# Patient Record
Sex: FEMALE | Race: WHITE | Employment: FULL TIME | URBAN - NONMETROPOLITAN AREA
[De-identification: names, ages, dates, MRNs, and addresses within clinical notes are randomized per-mention and may not be internally consistent; named-entity substitution may affect disease eponyms.]

---

## 2017-01-11 DIAGNOSIS — E55.9 VITAMIN D DEFICIENCY: Primary | ICD-10-CM

## 2017-01-11 RX ORDER — CHOLECALCIFEROL (VITAMIN D3) 1250 MCG
1 CAPSULE ORAL WEEKLY
Qty: 4 CAPSULE | Refills: 1 | Status: SHIPPED | OUTPATIENT
Start: 2017-01-11

## 2017-01-13 ENCOUNTER — OFFICE VISIT (OUTPATIENT)
Dept: BARIATRICS/WEIGHT MGMT | Age: 42
End: 2017-01-13

## 2017-01-13 VITALS
RESPIRATION RATE: 18 BRPM | SYSTOLIC BLOOD PRESSURE: 138 MMHG | WEIGHT: 293 LBS | DIASTOLIC BLOOD PRESSURE: 86 MMHG | TEMPERATURE: 98.8 F | HEIGHT: 67 IN | BODY MASS INDEX: 45.99 KG/M2 | HEART RATE: 76 BPM

## 2017-01-13 DIAGNOSIS — E66.01 OBESITY, MORBID, BMI 50 OR HIGHER (HCC): ICD-10-CM

## 2017-01-13 DIAGNOSIS — E66.9 OBESITY, UNSPECIFIED OBESITY SEVERITY, UNSPECIFIED OBESITY TYPE: Primary | ICD-10-CM

## 2017-01-13 DIAGNOSIS — K21.9 GASTROESOPHAGEAL REFLUX DISEASE, ESOPHAGITIS PRESENCE NOT SPECIFIED: ICD-10-CM

## 2017-01-13 DIAGNOSIS — Z13.21 MALNUTRITION SCREEN: Primary | ICD-10-CM

## 2017-01-13 DIAGNOSIS — I10 ESSENTIAL HYPERTENSION: ICD-10-CM

## 2017-01-13 DIAGNOSIS — E89.0 POSTOPERATIVE HYPOTHYROIDISM: ICD-10-CM

## 2017-01-13 DIAGNOSIS — K91.2 POSTOPERATIVE INTESTINAL MALABSORPTION: ICD-10-CM

## 2017-01-13 DIAGNOSIS — Z98.84 S/P LAPAROSCOPIC SLEEVE GASTRECTOMY: ICD-10-CM

## 2017-01-13 PROCEDURE — 99213 OFFICE O/P EST LOW 20 MIN: CPT | Performed by: PHYSICIAN ASSISTANT

## 2017-04-10 ENCOUNTER — OFFICE VISIT (OUTPATIENT)
Dept: BARIATRICS/WEIGHT MGMT | Age: 42
End: 2017-04-10

## 2017-04-10 VITALS
HEIGHT: 67 IN | BODY MASS INDEX: 45.99 KG/M2 | RESPIRATION RATE: 18 BRPM | DIASTOLIC BLOOD PRESSURE: 76 MMHG | SYSTOLIC BLOOD PRESSURE: 112 MMHG | TEMPERATURE: 98.5 F | WEIGHT: 293 LBS | HEART RATE: 76 BPM

## 2017-04-10 DIAGNOSIS — Z98.84 STATUS POST LAPAROSCOPIC SLEEVE GASTRECTOMY: ICD-10-CM

## 2017-04-10 DIAGNOSIS — Z13.21 SCREENING FOR MALNUTRITION: ICD-10-CM

## 2017-04-10 DIAGNOSIS — K91.2 POSTSURGICAL MALABSORPTION: ICD-10-CM

## 2017-04-10 DIAGNOSIS — E66.01 MORBID OBESITY WITH BMI OF 45.0-49.9, ADULT (HCC): ICD-10-CM

## 2017-04-10 PROCEDURE — 99213 OFFICE O/P EST LOW 20 MIN: CPT | Performed by: PHYSICIAN ASSISTANT

## 2017-04-10 RX ORDER — LANOLIN ALCOHOL/MO/W.PET/CERES
1000 CREAM (GRAM) TOPICAL DAILY
COMMUNITY

## 2017-04-10 RX ORDER — BIOTIN 1 MG
10000 TABLET ORAL DAILY
COMMUNITY
End: 2021-03-03 | Stop reason: ALTCHOICE

## 2017-05-01 ENCOUNTER — OFFICE VISIT (OUTPATIENT)
Dept: FAMILY MEDICINE CLINIC | Age: 42
End: 2017-05-01

## 2017-05-01 VITALS
WEIGHT: 290.2 LBS | BODY MASS INDEX: 45.55 KG/M2 | HEART RATE: 56 BPM | HEIGHT: 67 IN | RESPIRATION RATE: 16 BRPM | SYSTOLIC BLOOD PRESSURE: 122 MMHG | OXYGEN SATURATION: 98 % | DIASTOLIC BLOOD PRESSURE: 74 MMHG

## 2017-05-01 DIAGNOSIS — E03.9 ACQUIRED HYPOTHYROIDISM: ICD-10-CM

## 2017-05-01 DIAGNOSIS — I10 ESSENTIAL HYPERTENSION: Primary | ICD-10-CM

## 2017-05-01 DIAGNOSIS — G47.26 SHIFTING SLEEP-WORK SCHEDULE: ICD-10-CM

## 2017-05-01 DIAGNOSIS — L03.031 CELLULITIS OF RIGHT TOE: ICD-10-CM

## 2017-05-01 PROCEDURE — 99204 OFFICE O/P NEW MOD 45 MIN: CPT | Performed by: FAMILY MEDICINE

## 2017-05-01 RX ORDER — CEPHALEXIN 500 MG/1
500 CAPSULE ORAL 3 TIMES DAILY
Qty: 21 CAPSULE | Refills: 0 | Status: SHIPPED | OUTPATIENT
Start: 2017-05-01 | End: 2017-05-08

## 2017-05-01 ASSESSMENT — ENCOUNTER SYMPTOMS
ABDOMINAL PAIN: 0
COLOR CHANGE: 1
COUGH: 0
SHORTNESS OF BREATH: 0
EYE DISCHARGE: 0
DIARRHEA: 0
NAUSEA: 0
SORE THROAT: 0
CONSTIPATION: 0
RHINORRHEA: 0
WHEEZING: 0

## 2017-05-24 LAB
CHOLESTEROL, TOTAL: 162 MG/DL (ref 0–199)
FASTING: YES
GLUCOSE BLD-MCNC: 83 MG/DL (ref 74–109)
HDLC SERPL-MCNC: 44 MG/DL (ref 40–90)
LDL CHOLESTEROL CALCULATED: 108 MG/DL
TRIGL SERPL-MCNC: 52 MG/DL (ref 0–199)

## 2017-05-25 ENCOUNTER — EMPLOYEE WELLNESS (OUTPATIENT)
Dept: OTHER | Age: 42
End: 2017-05-25

## 2017-07-06 ENCOUNTER — OFFICE VISIT (OUTPATIENT)
Dept: FAMILY MEDICINE CLINIC | Age: 42
End: 2017-07-06

## 2017-07-06 VITALS
WEIGHT: 276.8 LBS | OXYGEN SATURATION: 98 % | RESPIRATION RATE: 16 BRPM | SYSTOLIC BLOOD PRESSURE: 122 MMHG | BODY MASS INDEX: 43.35 KG/M2 | DIASTOLIC BLOOD PRESSURE: 74 MMHG | HEART RATE: 62 BPM

## 2017-07-06 DIAGNOSIS — M25.562 ACUTE PAIN OF BOTH KNEES: Primary | ICD-10-CM

## 2017-07-06 DIAGNOSIS — M25.561 ACUTE PAIN OF BOTH KNEES: Primary | ICD-10-CM

## 2017-07-06 DIAGNOSIS — M70.52 KNEE BURSITIS, LEFT: ICD-10-CM

## 2017-07-06 PROCEDURE — 99213 OFFICE O/P EST LOW 20 MIN: CPT | Performed by: NURSE PRACTITIONER

## 2017-07-06 RX ORDER — CEPHALEXIN 500 MG/1
500 CAPSULE ORAL 3 TIMES DAILY
Qty: 30 CAPSULE | Refills: 0 | Status: SHIPPED | OUTPATIENT
Start: 2017-07-06 | End: 2017-07-16

## 2017-07-06 RX ORDER — METHYLPREDNISOLONE 4 MG/1
TABLET ORAL
Qty: 1 KIT | Refills: 0 | Status: SHIPPED | OUTPATIENT
Start: 2017-07-06 | End: 2017-07-11 | Stop reason: ALTCHOICE

## 2017-07-08 ASSESSMENT — ENCOUNTER SYMPTOMS
BACK PAIN: 0
COLOR CHANGE: 0
SHORTNESS OF BREATH: 0

## 2017-07-11 ENCOUNTER — OFFICE VISIT (OUTPATIENT)
Dept: BARIATRICS/WEIGHT MGMT | Age: 42
End: 2017-07-11

## 2017-07-11 VITALS
DIASTOLIC BLOOD PRESSURE: 80 MMHG | SYSTOLIC BLOOD PRESSURE: 118 MMHG | HEART RATE: 56 BPM | HEIGHT: 67 IN | RESPIRATION RATE: 18 BRPM | TEMPERATURE: 98.1 F | WEIGHT: 267 LBS | BODY MASS INDEX: 41.91 KG/M2

## 2017-07-11 DIAGNOSIS — K91.2 POSTSURGICAL MALABSORPTION: ICD-10-CM

## 2017-07-11 DIAGNOSIS — Z13.21 SCREENING FOR MALNUTRITION: ICD-10-CM

## 2017-07-11 DIAGNOSIS — K21.9 GASTROESOPHAGEAL REFLUX DISEASE, ESOPHAGITIS PRESENCE NOT SPECIFIED: ICD-10-CM

## 2017-07-11 DIAGNOSIS — E66.01 MORBID OBESITY WITH BMI OF 40.0-44.9, ADULT (HCC): Primary | ICD-10-CM

## 2017-07-11 DIAGNOSIS — E03.9 HYPOTHYROIDISM, UNSPECIFIED TYPE: ICD-10-CM

## 2017-07-11 DIAGNOSIS — I10 ESSENTIAL HYPERTENSION: ICD-10-CM

## 2017-07-11 PROCEDURE — 99213 OFFICE O/P EST LOW 20 MIN: CPT | Performed by: PHYSICIAN ASSISTANT

## 2017-07-20 ENCOUNTER — OFFICE VISIT (OUTPATIENT)
Dept: FAMILY MEDICINE CLINIC | Age: 42
End: 2017-07-20
Payer: COMMERCIAL

## 2017-07-20 VITALS
RESPIRATION RATE: 20 BRPM | OXYGEN SATURATION: 98 % | WEIGHT: 269 LBS | BODY MASS INDEX: 42.22 KG/M2 | HEIGHT: 67 IN | TEMPERATURE: 98.8 F | HEART RATE: 82 BPM | DIASTOLIC BLOOD PRESSURE: 80 MMHG | SYSTOLIC BLOOD PRESSURE: 122 MMHG

## 2017-07-20 DIAGNOSIS — J02.9 SORE THROAT: ICD-10-CM

## 2017-07-20 DIAGNOSIS — H61.23 BILATERAL IMPACTED CERUMEN: ICD-10-CM

## 2017-07-20 DIAGNOSIS — J02.0 ACUTE STREPTOCOCCAL PHARYNGITIS: Primary | ICD-10-CM

## 2017-07-20 LAB — S PYO AG THROAT QL: POSITIVE

## 2017-07-20 PROCEDURE — 69210 REMOVE IMPACTED EAR WAX UNI: CPT | Performed by: NURSE PRACTITIONER

## 2017-07-20 PROCEDURE — 99213 OFFICE O/P EST LOW 20 MIN: CPT | Performed by: NURSE PRACTITIONER

## 2017-07-20 PROCEDURE — 87880 STREP A ASSAY W/OPTIC: CPT | Performed by: NURSE PRACTITIONER

## 2017-07-20 RX ORDER — AMOXICILLIN 400 MG/5ML
500 POWDER, FOR SUSPENSION ORAL 3 TIMES DAILY
Qty: 189 ML | Refills: 0 | Status: SHIPPED | OUTPATIENT
Start: 2017-07-20 | End: 2017-07-30

## 2017-07-20 ASSESSMENT — ENCOUNTER SYMPTOMS
ABDOMINAL PAIN: 0
SINUS PRESSURE: 0
NAUSEA: 0
VOMITING: 0
SORE THROAT: 1
COLOR CHANGE: 0
DIARRHEA: 0
SHORTNESS OF BREATH: 0
WHEEZING: 0
COUGH: 0

## 2017-10-05 ENCOUNTER — HOSPITAL ENCOUNTER (OUTPATIENT)
Age: 42
Discharge: HOME | End: 2017-10-05
Payer: COMMERCIAL

## 2017-10-05 DIAGNOSIS — Z13.21 SCREENING FOR MALNUTRITION: ICD-10-CM

## 2017-10-05 DIAGNOSIS — K91.2 POSTSURGICAL MALABSORPTION: ICD-10-CM

## 2017-10-05 DIAGNOSIS — E66.01 MORBID OBESITY WITH BMI OF 40.0-44.9, ADULT (HCC): ICD-10-CM

## 2017-10-05 LAB
ALBUMIN SERPL-MCNC: 4.1 G/DL (ref 3.5–5.1)
ALP BLD-CCNC: 63 U/L (ref 38–126)
ALT SERPL-CCNC: 15 U/L (ref 11–66)
ANION GAP SERPL CALCULATED.3IONS-SCNC: 7 MEQ/L (ref 8–16)
AST SERPL-CCNC: 19 U/L (ref 5–40)
AVERAGE GLUCOSE: 87 MG/DL (ref 70–126)
BASOPHILS # BLD: 0.5 %
BASOPHILS ABSOLUTE: 0 THOU/MM3 (ref 0–0.1)
BILIRUB SERPL-MCNC: 0.7 MG/DL (ref 0.3–1.2)
BUN BLDV-MCNC: 16 MG/DL (ref 7–22)
CALCIUM SERPL-MCNC: 9 MG/DL (ref 8.5–10.5)
CHLORIDE BLD-SCNC: 104 MEQ/L (ref 98–111)
CHOLESTEROL, TOTAL: 156 MG/DL (ref 100–199)
CO2: 29 MEQ/L (ref 23–33)
CREAT SERPL-MCNC: 0.5 MG/DL (ref 0.4–1.2)
EOSINOPHIL # BLD: 3.1 %
EOSINOPHILS ABSOLUTE: 0.2 THOU/MM3 (ref 0–0.4)
FERRITIN: 133 NG/ML (ref 10–291)
GFR SERPL CREATININE-BSD FRML MDRD: > 90 ML/MIN/1.73M2
GLUCOSE BLD-MCNC: 80 MG/DL (ref 70–108)
HBA1C MFR BLD: 4.9 % (ref 4.4–6.4)
HCT VFR BLD CALC: 39.5 % (ref 37–47)
HDLC SERPL-MCNC: 51 MG/DL
HEMOGLOBIN: 13.6 GM/DL (ref 12–16)
IRON: 102 UG/DL (ref 50–170)
LDL CHOLESTEROL CALCULATED: 98 MG/DL
LYMPHOCYTES # BLD: 40.7 %
LYMPHOCYTES ABSOLUTE: 2.3 THOU/MM3 (ref 1–4.8)
MCH RBC QN AUTO: 30.7 PG (ref 27–31)
MCHC RBC AUTO-ENTMCNC: 34.4 GM/DL (ref 33–37)
MCV RBC AUTO: 89.3 FL (ref 81–99)
MONOCYTES # BLD: 5.4 %
MONOCYTES ABSOLUTE: 0.3 THOU/MM3 (ref 0.4–1.3)
NUCLEATED RED BLOOD CELLS: 0 /100 WBC
PDW BLD-RTO: 14.4 % (ref 11.5–14.5)
PLATELET # BLD: 227 THOU/MM3 (ref 130–400)
PMV BLD AUTO: 8.6 MCM (ref 7.4–10.4)
POTASSIUM SERPL-SCNC: 4.4 MEQ/L (ref 3.5–5.2)
PREALBUMIN: 17.8 MG/DL (ref 20–40)
PTH INTACT: 33.5 PG/ML (ref 15–65)
RBC # BLD: 4.43 MILL/MM3 (ref 4.2–5.4)
RBC # BLD: NORMAL 10*6/UL
SEG NEUTROPHILS: 50.3 %
SEGMENTED NEUTROPHILS ABSOLUTE COUNT: 2.8 THOU/MM3 (ref 1.8–7.7)
SODIUM BLD-SCNC: 140 MEQ/L (ref 135–145)
TOTAL IRON BINDING CAPACITY: 239 UG/DL (ref 171–450)
TOTAL PROTEIN: 6.4 G/DL (ref 6.1–8)
TRIGL SERPL-MCNC: 35 MG/DL (ref 0–199)
TSH SERPL DL<=0.05 MIU/L-ACNC: 0.83 UIU/ML (ref 0.4–4.2)
VITAMIN D 25-HYDROXY: 38 NG/ML (ref 30–100)
WBC # BLD: 5.6 THOU/MM3 (ref 4.8–10.8)

## 2017-10-05 PROCEDURE — 83540 ASSAY OF IRON: CPT

## 2017-10-05 PROCEDURE — 82306 VITAMIN D 25 HYDROXY: CPT

## 2017-10-05 PROCEDURE — 80061 LIPID PANEL: CPT

## 2017-10-05 PROCEDURE — 80050 GENERAL HEALTH PANEL: CPT

## 2017-10-05 PROCEDURE — 83036 HEMOGLOBIN GLYCOSYLATED A1C: CPT

## 2017-10-05 PROCEDURE — 84134 ASSAY OF PREALBUMIN: CPT

## 2017-10-05 PROCEDURE — 83550 IRON BINDING TEST: CPT

## 2017-10-05 PROCEDURE — 84425 ASSAY OF VITAMIN B-1: CPT

## 2017-10-05 PROCEDURE — 36415 COLL VENOUS BLD VENIPUNCTURE: CPT

## 2017-10-05 PROCEDURE — 83970 ASSAY OF PARATHORMONE: CPT

## 2017-10-05 PROCEDURE — 82728 ASSAY OF FERRITIN: CPT

## 2017-10-08 LAB — VITAMIN B1 WHOLE BLOOD: 120 NMOL/L (ref 70–180)

## 2017-10-10 ENCOUNTER — OFFICE VISIT (OUTPATIENT)
Dept: BARIATRICS/WEIGHT MGMT | Age: 42
End: 2017-10-10
Payer: COMMERCIAL

## 2017-10-10 ENCOUNTER — OFFICE VISIT (OUTPATIENT)
Dept: BARIATRICS/WEIGHT MGMT | Age: 42
End: 2017-10-10

## 2017-10-10 VITALS
SYSTOLIC BLOOD PRESSURE: 122 MMHG | RESPIRATION RATE: 18 BRPM | WEIGHT: 262 LBS | HEART RATE: 56 BPM | TEMPERATURE: 98.5 F | BODY MASS INDEX: 41.12 KG/M2 | HEIGHT: 67 IN | DIASTOLIC BLOOD PRESSURE: 86 MMHG

## 2017-10-10 DIAGNOSIS — E03.9 HYPOTHYROIDISM, UNSPECIFIED TYPE: ICD-10-CM

## 2017-10-10 DIAGNOSIS — K21.9 GASTROESOPHAGEAL REFLUX DISEASE, ESOPHAGITIS PRESENCE NOT SPECIFIED: ICD-10-CM

## 2017-10-10 DIAGNOSIS — I10 ESSENTIAL HYPERTENSION: ICD-10-CM

## 2017-10-10 DIAGNOSIS — Z98.84 STATUS POST LAPAROSCOPIC SLEEVE GASTRECTOMY: ICD-10-CM

## 2017-10-10 DIAGNOSIS — E66.01 MORBID OBESITY, UNSPECIFIED OBESITY TYPE (HCC): Primary | ICD-10-CM

## 2017-10-10 DIAGNOSIS — E66.01 MORBID OBESITY WITH BMI OF 40.0-44.9, ADULT (HCC): Primary | ICD-10-CM

## 2017-10-10 PROCEDURE — 99213 OFFICE O/P EST LOW 20 MIN: CPT | Performed by: PHYSICIAN ASSISTANT

## 2017-10-10 NOTE — PATIENT INSTRUCTIONS
Great job on all your hard work! 1. Check out [employer]  2. Continue your bariatric vitamins as you are taking. Get your B12 vitamin level rechecked  3. Protein goal is 60-80 grams protein per day. Choose lean protein foods first, followed by vegetables and fruit, then starch food last if still hungry.

## 2017-10-10 NOTE — PROGRESS NOTES
2,000IU's daily, B12 once a day- pt to have B12 level rechecked    Does patient exercise: daily- walking four miles at a time, has  twice a week    Assessment  Patient with appropriate weight loss at one year post op. Focuses on protein foods first at meals. Taking bariatric vitamins as recommended to meet ASMBS guidelines. Regular cardiac and strength training activity. Plan  Plan/Recommendations: Positive reinforcement given. Discussed options for various websites sand resources. -  Patient Instructions   Andrew Feliberto wesley on all your hard work! 1. Check out [employer]  2. Continue your bariatric vitamins as you are taking. Get your B12 vitamin level rechecked  3. Protein goal is 60-80 grams protein per day. Choose lean protein foods first, followed by vegetables and fruit, then starch food last if still hungry.         Recommended Follow-Up: 3 month follow up per pt request with PA and BRIANNE Lira RD, LD  Dietitian- [facility]

## 2017-10-10 NOTE — PROGRESS NOTES
12/04/2015    Right Knee Arthroscopy    SLEEVE GASTRECTOMY  10/10/2016    robotic    STOMACH SURGERY      gastric sleeve    THYROIDECTOMY Bilateral 09/2016    TOOTH EXTRACTION Right 5/30/13    right lower       Past Social History:  Social History     Social History    Marital status:      Spouse name: N/A    Number of children: N/A    Years of education: N/A     Occupational History    Not on file. Social History Main Topics    Smoking status: Never Smoker    Smokeless tobacco: Never Used    Alcohol use 0.0 oz/week      Comment: social    Drug use: No    Sexual activity: Yes     Other Topics Concern    Not on file     Social History Narrative    No narrative on file        Medications:   Current Outpatient Prescriptions   Medication Sig Dispense Refill    vitamin B-12 (CYANOCOBALAMIN) 1000 MCG tablet Take 1,000 mcg by mouth daily      Biotin 1000 MCG TABS Take 10,000 mcg by mouth daily      Cholecalciferol (VITAMIN D3) 92078 UNITS CAPS Take 1 capsule by mouth once a week 4 capsule 1    lansoprazole (PREVACID) 30 MG delayed release capsule Take 1 capsule by mouth daily 30 capsule 3    calcium carbonate (OSCAL) 500 MG TABS tablet Take 500 mg by mouth 3 times daily      levothyroxine (SYNTHROID) 200 MCG tablet Take 225 mcg by mouth Daily      escitalopram (LEXAPRO) 10 MG tablet Take 10 mg by mouth daily      losartan (COZAAR) 100 MG tablet Take 100 mg by mouth daily       multivitamin (THERAGRAN) per tablet Take 1 tablet by mouth daily. No current facility-administered medications for this visit. Allergies:   No Known Allergies    Subjective:    Constitutional: Denies any fever, chills, fatigue. Wound: Denies any rash, skin color changes or wound problems. Resp: Denies any cough, shortness of breath. CV: Denies any chest pain, orthopnea or syncope. MS: Denies myalgias, arthralgias. GI: Denies any nausea, vomiting, diarrhea, constipation, melena, hematochezia.  No incisional discomfort. : Denies any hematuria, hesitancy or dysuria. Objective:      /86 (Site: Right Arm, Position: Sitting, Cuff Size: Large Adult)   Pulse 56   Temp 98.5 °F (36.9 °C) (Oral)   Resp 18   Ht 5' 6.5\" (1.689 m)   Wt 262 lb (118.8 kg)   BMI 41.65 kg/m²     Physical Examination:   Constitutional: Alert and oriented to person, place and time. Well-developed, well- nourished. Head: Normocephalic and atraumatic  Neck: Supple. Eyes: EOMI b/l. Conjunctivae normal.  No scleral icterus. Respiratory: Effort normal. No respiratory distress. Abd: Benign  Ext:  Movement x 4. No edema  Skin; Warm and dry, no visible rashes, lesions or ulcers.    Neuro: Cranial Nerves Grossly Intact; nml coordination    Labs:  CBC   Lab Results   Component Value Date    WBC 5.6 10/05/2017    RBC 4.43 10/05/2017    RBC 3.31 06/01/2012    HGB 13.6 10/05/2017    HCT 39.5 10/05/2017    MCV 89.3 10/05/2017    MCH 30.7 10/05/2017    MCHC 34.4 10/05/2017    RDW 14.4 10/05/2017     10/05/2017    MPV 8.6 10/05/2017    RBCMORP NORMAL 10/05/2017    SEGSPCT 50.3 10/05/2017    LABLYMP 40.7 10/05/2017    MONOPCT 5.4 10/05/2017    LABEOS 3.1 10/05/2017    BASO 0.5 10/05/2017    NRBC 0 10/05/2017    ANISOCYTOSIS 1+ 01/10/2017    SEGSABS 2.8 10/05/2017    LYMPHSABS 2.3 10/05/2017    MONOSABS 0.3 10/05/2017    EOSABS 0.2 10/05/2017    BASOSABS 0.0 10/05/2017        BMP/CMP   Lab Results   Component Value Date    GLUCOSE 80 10/05/2017    GLUCOSE 87 05/12/2012    CREATININE 0.5 10/05/2017    BUN 16 10/05/2017     10/05/2017    K 4.4 10/05/2017     10/05/2017    CO2 29 10/05/2017    CALCIUM 9.0 10/05/2017    AST 19 10/05/2017    ALKPHOS 63 10/05/2017    PROT 6.4 10/05/2017    LABALBU 4.1 10/05/2017    LABALBU 3.8 05/12/2012    BILITOT 0.7 10/05/2017    BILITOT NEGATIVE 08/23/2011    ALT 15 10/05/2017        PREALBUMIN   Lab Results   Component Value Date    PREALBUMIN 17.8 10/05/2017        VITAMIN B12   Lab Results   Component Value Date    QFZQDDNC04 682 06/27/2016        24 HOUR URINE CALCIUM   Lab Results   Component Value Date    URVOLMEAS 1530 04/06/2017    HOURSC 24.0 04/06/2017    CALCIUMUR 11.4 04/06/2017    CALCIUMUR 174 04/06/2017        VITAMIN D   Lab Results   Component Value Date    VITD25 38 10/05/2017        RBC FOLATE   Lab Results   Component Value Date    FOLATE > 20.0 06/27/2016        LIPID SCREEN (FASTING)   Lab Results   Component Value Date    CHOL 156 10/05/2017    TRIG 35 10/05/2017    HDL 51 10/05/2017    LDLCALC 98 10/05/2017     HGA1C (T2DM ONLY)   Lab Results   Component Value Date    LABA1C 4.9 10/05/2017    AVGG 87 10/05/2017     TSH   Lab Results   Component Value Date    TSH 0.832 10/05/2017        IRON   Lab Results   Component Value Date    IRON 102 10/05/2017         Assessment:      1. Morbid obesity with BMI of 40.0-44.9, adult ([facility] [address]     2. Status post laparoscopic sleeve gastrectomy     3. Gastroesophageal reflux disease, esophagitis presence not specified     4. Essential hypertension     5. Hypothyroidism, unspecified type       Plan:    · Continue to stay well hydrated, continue drinking at least 64 oz of non-carbonated, non-caffeinated fluids daily. · Continue a minimum of 60 grams of protein daily  ·  Signs and symptoms reviewed with patient that would be concerning and need her to return to office for re-evaluation. Patient will call if any questions or concerns arrise. · Continue tracking food on malachi on phone  · Continue with physical activity/ / strength training  · Continue taking Multivitamin, Calcium/Vit D and B12  · Continue PPI as needed  · Encouraged to attend support groups  · Reviewed 1 year labs- PAB 17.8- advised to push protein  · SECA scale completed and reviewed with patient  · Measurements completed and reviewed  · No labs for next visit  Return in about 6 months (around 4/10/2018) for postop follow up.        I spent over 15 minutes with the

## 2017-10-28 ENCOUNTER — HOSPITAL ENCOUNTER (OUTPATIENT)
Age: 42
Discharge: HOME | End: 2017-10-28
Payer: COMMERCIAL

## 2017-10-28 LAB
THYROXINE (T4): 7.2 UG/DL (ref 4.5–12)
TSH SERPL DL<=0.05 MIU/L-ACNC: 2.42 UIU/ML (ref 0.4–4.2)

## 2017-10-28 PROCEDURE — 84443 ASSAY THYROID STIM HORMONE: CPT

## 2017-10-28 PROCEDURE — 84436 ASSAY OF TOTAL THYROXINE: CPT

## 2017-10-28 PROCEDURE — 36415 COLL VENOUS BLD VENIPUNCTURE: CPT

## 2017-11-01 ENCOUNTER — OFFICE VISIT (OUTPATIENT)
Dept: FAMILY MEDICINE CLINIC | Age: 42
End: 2017-11-01
Payer: COMMERCIAL

## 2017-11-01 VITALS
DIASTOLIC BLOOD PRESSURE: 80 MMHG | TEMPERATURE: 97.9 F | HEART RATE: 60 BPM | WEIGHT: 263.2 LBS | BODY MASS INDEX: 42.3 KG/M2 | RESPIRATION RATE: 16 BRPM | HEIGHT: 66 IN | SYSTOLIC BLOOD PRESSURE: 126 MMHG

## 2017-11-01 DIAGNOSIS — F51.01 PRIMARY INSOMNIA: ICD-10-CM

## 2017-11-01 DIAGNOSIS — Z98.84 S/P LAPAROSCOPIC SLEEVE GASTRECTOMY: ICD-10-CM

## 2017-11-01 DIAGNOSIS — E66.01 MORBID OBESITY DUE TO EXCESS CALORIES (HCC): ICD-10-CM

## 2017-11-01 DIAGNOSIS — I10 ESSENTIAL HYPERTENSION: Primary | ICD-10-CM

## 2017-11-01 PROCEDURE — 99214 OFFICE O/P EST MOD 30 MIN: CPT | Performed by: FAMILY MEDICINE

## 2017-11-01 RX ORDER — ESCITALOPRAM OXALATE 10 MG/1
10 TABLET ORAL DAILY
Qty: 90 TABLET | Refills: 3 | Status: SHIPPED | OUTPATIENT
Start: 2017-11-01 | End: 2018-11-05 | Stop reason: SDUPTHER

## 2017-11-01 RX ORDER — LOSARTAN POTASSIUM 100 MG/1
100 TABLET ORAL DAILY
Qty: 90 TABLET | Refills: 3 | Status: SHIPPED | OUTPATIENT
Start: 2017-11-01 | End: 2018-11-05 | Stop reason: SDUPTHER

## 2017-11-01 RX ORDER — LANSOPRAZOLE 30 MG/1
30 CAPSULE, DELAYED RELEASE ORAL DAILY
Qty: 90 CAPSULE | Refills: 3 | Status: SHIPPED | OUTPATIENT
Start: 2017-11-01 | End: 2018-11-05 | Stop reason: SDUPTHER

## 2017-11-01 ASSESSMENT — ENCOUNTER SYMPTOMS
COUGH: 0
SHORTNESS OF BREATH: 0
EYE DISCHARGE: 0
SORE THROAT: 0
DIARRHEA: 0
RHINORRHEA: 0
NAUSEA: 0
WHEEZING: 0
ABDOMINAL PAIN: 0
CONSTIPATION: 0

## 2017-11-01 NOTE — PROGRESS NOTES
Jazmine Melonie  [address] [place] [address]  Dept: [phone]  Dept Fax: [phone]  Loc: [phone]    Rafael Tompkins is a 40 y.o. female who presents today for her medical conditions/complaints as noted below. Chief Complaint   Patient presents with    6 Month Follow-Up     HTN    Other     Patient states she has a mole on her back she would like looked at           HPI:     Milena Boothe presents for six-month recheck. States that she feels well and has no complaints today. She has not needed her CPAP machine since her gastric bypass surgery. She is continuing to lose weight and feels great. She states that she eats healthy and exercises regularly. Dewaine Labrum blood pressure is been well controlled with systolic running between 726 and 135. She takes only the Cozaar. Heart rate is usually high 50s to low 60s but patient denies any dizziness or lightheadedness. Takes Lexapro to help her sleep and states this works well but wants to try to wean off of it in the future. She states she'll wait until after winter. Finally, patient requests refill of her Prevacid that she takes this to help with heartburn symptoms which have been well controlled. We'll get her flu shot at work within the next week.         Past Medical History:   Diagnosis Date    Headache(784.0)     Hypertension     Infertility     Nausea & vomiting     with c birth    YIFAN on CPAP     Polycystic ovaries     PONV (postoperative nausea and vomiting)     PVC (premature ventricular contraction)     seen by Dr. Brett Halsted, echo done (normal) - decreased caffeine & PVC are less    Thyroid disease       Past Surgical History:   Procedure Laterality Date    APPENDECTOMY  2004     SECTION      CHOLECYSTECTOMY      CYST REMOVAL      ovarian    DILATION AND CURETTAGE OF UTERUS  2014    HYSTEROSCOPY  6/3/14    Dilation and Curettage    KNEE ARTHROSCOPY      OTHER SURGICAL HISTORY Right 12/04/2015    Right Knee Arthroscopy    SLEEVE GASTRECTOMY  10/10/2016    robotic    STOMACH SURGERY      gastric sleeve    THYROIDECTOMY Bilateral 09/2016    TOOTH EXTRACTION Right 5/30/13    right lower       Family History   Problem Relation Age of Onset    Hypertension Mother    [place] Arthritis Mother     Other Mother      at fib    Obesity Father     Lung Cancer Father     Obesity Brother     Heart Attack Paternal Grandfather     Cancer Brother        Social History   Substance Use Topics    Smoking status: Never Smoker    Smokeless tobacco: Never Used    Alcohol use 0.0 oz/week      Comment: social      Current Outpatient Prescriptions   Medication Sig Dispense Refill    losartan (COZAAR) 100 MG tablet Take 1 tablet by mouth daily 90 tablet 3    lansoprazole (PREVACID) 30 MG delayed release capsule Take 1 capsule by mouth daily 90 capsule 3    escitalopram (LEXAPRO) 10 MG tablet Take 1 tablet by mouth daily 90 tablet 3    vitamin B-12 (CYANOCOBALAMIN) 1000 MCG tablet Take 1,000 mcg by mouth daily      Biotin 1000 MCG TABS Take 10,000 mcg by mouth daily      Cholecalciferol (VITAMIN D3) 54551 UNITS CAPS Take 1 capsule by mouth once a week 4 capsule 1    calcium carbonate (OSCAL) 500 MG TABS tablet Take 500 mg by mouth 3 times daily      levothyroxine (SYNTHROID) 200 MCG tablet Take 225 mcg by mouth Daily      multivitamin (THERAGRAN) per tablet Take 1 tablet by mouth daily. No current facility-administered medications for this visit. No Known Allergies    Health Maintenance   Topic Date Due    HIV screen  02/03/1991    DTaP/Tdap/Td vaccine (1 - Tdap) 02/03/1995    Cervical cancer screen  02/03/1997    Flu vaccine (1) 09/01/2017    Lipid screen  10/05/2022       Subjective:      Review of Systems   Constitutional: Negative for chills, fatigue and fever. HENT: Negative for congestion, rhinorrhea and sore throat.     Eyes: Negative for discharge and visual disturbance. Respiratory: Negative for cough, shortness of breath and wheezing. Cardiovascular: Negative for chest pain and palpitations. Gastrointestinal: Negative for abdominal pain, constipation, diarrhea and nausea. Genitourinary: Negative for dysuria and hematuria. Musculoskeletal: Negative for arthralgias and myalgias. Skin:        \"lesion on upper back\" noticed a few days ago   Neurological: Negative for dizziness and headaches. Psychiatric/Behavioral: Negative for sleep disturbance. The patient is not nervous/anxious. Objective:     Physical Exam   Constitutional: She is oriented to person, place, and time. She appears well-developed and well-nourished. Morbidly obese   HENT:   Head: Normocephalic and atraumatic. Eyes: Conjunctivae and EOM are normal. Right eye exhibits no discharge. Left eye exhibits no discharge. No scleral icterus. Neck: Normal range of motion. Cardiovascular: Normal rate, regular rhythm and normal heart sounds. Pulmonary/Chest: Effort normal and breath sounds normal. She has no wheezes. Abdominal: Soft. Bowel sounds are normal. She exhibits no distension. There is no tenderness. Musculoskeletal: She exhibits no edema or tenderness. Lymphadenopathy:     She has no cervical adenopathy. Neurological: She is alert and oriented to person, place, and time. Coordination normal.   Skin: Skin is warm and dry. Psychiatric: She has a normal mood and affect. Her behavior is normal. Judgment and thought content normal.   Nursing note and vitals reviewed. /80 (Site: Left Arm, Position: Sitting, Cuff Size: Large Adult)   Pulse 60   Temp 97.9 °F (36.6 °C) (Oral)   Resp 16   Ht 5' 6.5\" (1.689 m)   Wt 263 lb 3.2 oz (119.4 kg)   BMI 41.85 kg/m²     Assessment:      1. Essential hypertension     2. S/P laparoscopic sleeve gastrectomy     3. Primary insomnia     4.  Morbid obesity due to excess calories (HCC)         Plan:   HTN- chronic, controlled, cont current meds  Insomnia- controlled with lexapro. Consider weaning off in spring  Morbid obesity, s/p gastrectomy. Cont diet/exercise, weight loss  Lesion on back- monitor. Consider cryotherapy if not resolved within next few months   Tristian Hamilton received counseling on the following healthy behaviors: nutrition    Patient given educational materials on Hypertension    I have instructed Tristian Hamilton to complete a self tracking handout on Blood Pressures  and instructed them to bring it with them to her next appointment. Discussed use, benefit, and side effects of prescribed medications. Barriers to medication compliance addressed. All patient questions answered. Pt voiced understanding. Return in about 6 months (around 5/1/2018) for follow up, HTN. No orders of the defined types were placed in this encounter. Orders Placed This Encounter   Medications    losartan (COZAAR) 100 MG tablet     Sig: Take 1 tablet by mouth daily     Dispense:  90 tablet     Refill:  3    lansoprazole (PREVACID) 30 MG delayed release capsule     Sig: Take 1 capsule by mouth daily     Dispense:  90 capsule     Refill:  3    escitalopram (LEXAPRO) 10 MG tablet     Sig: Take 1 tablet by mouth daily     Dispense:  90 tablet     Refill:  3        Reccommended tobacco cessation options including pharmacologic methods, counseled great than 3 minutes during this visit:  Yes  []  No  []     Patient given educational materials - see patient instructions. Discussed use, benefit, and side effects of prescribed medications. All patient questions answered. Pt voiced understanding. Reviewed health maintenance. Instructed to continue current medications, diet and exercise. Patient agreed with treatment plan. Follow up as directed.      Electronically signed by Kaylan Sharp MD on 11/1/2017 at 8:54 AM

## 2017-11-01 NOTE — PATIENT INSTRUCTIONS
Patient Education        DASH Diet: Care Instructions  Your Care Instructions  The DASH diet is an eating plan that can help lower your blood pressure. DASH stands for Dietary Approaches to Stop Hypertension. Hypertension is high blood pressure. The DASH diet focuses on eating foods that are high in calcium, potassium, and magnesium. These nutrients can lower blood pressure. The foods that are highest in these nutrients are fruits, vegetables, low-fat dairy products, nuts, seeds, and legumes. But taking calcium, potassium, and magnesium supplements instead of eating foods that are high in those nutrients does not have the same effect. The DASH diet also includes whole grains, fish, and poultry. The DASH diet is one of several lifestyle changes your doctor may recommend to lower your high blood pressure. Your doctor may also want you to decrease the amount of sodium in your diet. Lowering sodium while following the DASH diet can lower blood pressure even further than just the DASH diet alone. Follow-up care is a key part of your treatment and safety. Be sure to make and go to all appointments, and call your doctor if you are having problems. It's also a good idea to know your test results and keep a list of the medicines you take. How can you care for yourself at home? Following the DASH diet  · Eat 4 to 5 servings of fruit each day. A serving is 1 medium-sized piece of fruit, ½ cup chopped or canned fruit, 1/4 cup dried fruit, or 4 ounces (½ cup) of fruit juice. Choose fruit more often than fruit juice. · Eat 4 to 5 servings of vegetables each day. A serving is 1 cup of lettuce or raw leafy vegetables, ½ cup of chopped or cooked vegetables, or 4 ounces (½ cup) of vegetable juice. Choose vegetables more often than vegetable juice. · Get 2 to 3 servings of low-fat and fat-free dairy each day. A serving is 8 ounces of milk, 1 cup of yogurt, or 1 ½ ounces of cheese. · Eat 6 to 8 servings of grains each day.  A

## 2018-01-16 ENCOUNTER — OFFICE VISIT (OUTPATIENT)
Dept: BARIATRICS/WEIGHT MGMT | Age: 43
End: 2018-01-16

## 2018-01-16 ENCOUNTER — OFFICE VISIT (OUTPATIENT)
Dept: BARIATRICS/WEIGHT MGMT | Age: 43
End: 2018-01-16
Payer: COMMERCIAL

## 2018-01-16 VITALS
DIASTOLIC BLOOD PRESSURE: 90 MMHG | SYSTOLIC BLOOD PRESSURE: 124 MMHG | HEART RATE: 64 BPM | TEMPERATURE: 98.7 F | WEIGHT: 270 LBS | BODY MASS INDEX: 42.38 KG/M2 | HEIGHT: 67 IN

## 2018-01-16 VITALS — HEIGHT: 67 IN | BODY MASS INDEX: 42.38 KG/M2 | WEIGHT: 270 LBS

## 2018-01-16 DIAGNOSIS — E66.01 MORBID OBESITY WITH BMI OF 40.0-44.9, ADULT (HCC): Primary | ICD-10-CM

## 2018-01-16 DIAGNOSIS — Z98.84 S/P LAPAROSCOPIC SLEEVE GASTRECTOMY: Primary | ICD-10-CM

## 2018-01-16 DIAGNOSIS — K21.9 GASTROESOPHAGEAL REFLUX DISEASE, ESOPHAGITIS PRESENCE NOT SPECIFIED: ICD-10-CM

## 2018-01-16 DIAGNOSIS — I10 ESSENTIAL HYPERTENSION: ICD-10-CM

## 2018-01-16 DIAGNOSIS — E03.9 HYPOTHYROIDISM, UNSPECIFIED TYPE: ICD-10-CM

## 2018-01-16 DIAGNOSIS — Z98.84 S/P LAPAROSCOPIC SLEEVE GASTRECTOMY: ICD-10-CM

## 2018-01-16 PROCEDURE — 99213 OFFICE O/P EST LOW 20 MIN: CPT | Performed by: PHYSICIAN ASSISTANT

## 2018-01-16 NOTE — PATIENT INSTRUCTIONS
Goals: 1. Increase your activity at least three days a week exercise videos and make sure hitting 10,000 steps per day  2. Continue to aim for at least 60-80 grams protein per day  3.   Be mindful with your food choices in evening - see lists given to you today

## 2018-03-09 ENCOUNTER — APPOINTMENT (OUTPATIENT)
Dept: GENERAL RADIOLOGY | Age: 43
End: 2018-03-09
Payer: COMMERCIAL

## 2018-03-09 ENCOUNTER — HOSPITAL ENCOUNTER (OUTPATIENT)
Age: 43
Setting detail: OBSERVATION
Discharge: HOME | End: 2018-03-10
Attending: EMERGENCY MEDICINE | Admitting: INTERNAL MEDICINE
Payer: COMMERCIAL

## 2018-03-09 ENCOUNTER — APPOINTMENT (OUTPATIENT)
Dept: CT IMAGING | Age: 43
End: 2018-03-09
Payer: COMMERCIAL

## 2018-03-09 ENCOUNTER — APPOINTMENT (OUTPATIENT)
Dept: MRI IMAGING | Age: 43
End: 2018-03-09
Payer: COMMERCIAL

## 2018-03-09 DIAGNOSIS — S39.012A STRAIN OF LUMBAR REGION, INITIAL ENCOUNTER: ICD-10-CM

## 2018-03-09 DIAGNOSIS — S09.90XA CLOSED HEAD INJURY, INITIAL ENCOUNTER: ICD-10-CM

## 2018-03-09 DIAGNOSIS — R55 SYNCOPE, UNSPECIFIED SYNCOPE TYPE: Primary | ICD-10-CM

## 2018-03-09 PROBLEM — E28.2 PCO (POLYCYSTIC OVARIES): Status: ACTIVE | Noted: 2018-03-09

## 2018-03-09 LAB
ALBUMIN SERPL-MCNC: 3.4 GM/DL (ref 3.4–5)
ALP BLD-CCNC: 53 U/L (ref 46–116)
ALT SERPL-CCNC: 26 U/L (ref 14–63)
AMORPHOUS: ABNORMAL
ANION GAP: 5 MEQ/L (ref 8–16)
AST SERPL-CCNC: 20 U/L (ref 15–37)
BACTERIA: ABNORMAL
BASOPHILS # BLD: 0.3 % (ref 0–3)
BILIRUB SERPL-MCNC: 0.4 MG/DL (ref 0.2–1)
BILIRUBIN URINE: NEGATIVE
BLOOD, URINE: NEGATIVE
BUN BLDV-MCNC: 10 MG/DL (ref 7–18)
CASTS UA: ABNORMAL /LPF
CHARACTER, URINE: ABNORMAL
CHLORIDE BLD-SCNC: 103 MEQ/L (ref 98–107)
CO2: 33 MEQ/L (ref 21–32)
COLOR: YELLOW
CREAT SERPL-MCNC: 0.6 MG/DL (ref 0.6–1.3)
CRYSTALS, UA: ABNORMAL
D-DIMER QUANTITATIVE: 459 NG/ML FEU (ref 0–500)
EKG ATRIAL RATE: 62 BPM
EKG ATRIAL RATE: 62 BPM
EKG P AXIS: 49 DEGREES
EKG P AXIS: 54 DEGREES
EKG P-R INTERVAL: 170 MS
EKG P-R INTERVAL: 180 MS
EKG Q-T INTERVAL: 448 MS
EKG Q-T INTERVAL: 452 MS
EKG QRS DURATION: 92 MS
EKG QRS DURATION: 98 MS
EKG QTC CALCULATION (BAZETT): 454 MS
EKG QTC CALCULATION (BAZETT): 458 MS
EKG R AXIS: 11 DEGREES
EKG R AXIS: 5 DEGREES
EKG T AXIS: 26 DEGREES
EKG T AXIS: 37 DEGREES
EKG VENTRICULAR RATE: 62 BPM
EKG VENTRICULAR RATE: 62 BPM
EOSINOPHILS RELATIVE PERCENT: 1.6 % (ref 0–4)
EPITHELIAL CELLS, UA: ABNORMAL /HPF
FLU A ANTIGEN: NEGATIVE
FLU B ANTIGEN: NEGATIVE
FOLATE: 7.6 NG/ML (ref 4.8–24.2)
GFR, ESTIMATED: > 90 ML/MIN/1.73M2
GLUCOSE BLD-MCNC: 90 MG/DL (ref 74–106)
GLUCOSE, URINE: NEGATIVE MG/DL
HCT VFR BLD CALC: 40 % (ref 37–47)
HEMOGLOBIN: 13.5 GM/DL (ref 12–16)
KETONES, URINE: NEGATIVE
LEUKOCYTE ESTERASE, URINE: NEGATIVE
LV EF: 63 %
LVEF MODALITY: NORMAL
LYMPHOCYTES # BLD: 32.3 % (ref 15–47)
MCH RBC QN AUTO: 30.3 PG (ref 27–31)
MCHC RBC AUTO-ENTMCNC: 33.8 GM/DL (ref 33–37)
MCV RBC AUTO: 89.7 FL (ref 81–99)
MONOCYTES: 5.3 % (ref 0–12)
MUCUS: ABNORMAL
NITRITE, URINE: NEGATIVE
PDW BLD-RTO: 11.5 % (ref 11.5–14.5)
PH UA: 6 (ref 5–9)
PLATELET # BLD: 220 THOU/MM3 (ref 130–400)
PMV BLD AUTO: 7.3 FL (ref 7.4–10.4)
POC CALCIUM: 8.7 MG/DL (ref 8.5–10.1)
POTASSIUM SERPL-SCNC: 4 MEQ/L (ref 3.5–5.1)
PROTEIN UA: ABNORMAL MG/DL
RBC # BLD: 4.45 MILL/MM3 (ref 4.2–5.4)
RBC UA: ABNORMAL /HPF
REFLEX TO URINE C & S: ABNORMAL
SEGS: 60.5 % (ref 43–75)
SODIUM BLD-SCNC: 141 MEQ/L (ref 136–145)
SPECIFIC GRAVITY UA: 1.02 (ref 1–1.03)
TOTAL PROTEIN: 6.6 GM/DL (ref 6.4–8.2)
TROPONIN I: < 0.017 NG/ML (ref 0.02–0.05)
TROPONIN T: < 0.01 NG/ML
UROBILINOGEN, URINE: 0.2 EU/DL (ref 0–1)
VITAMIN B-12: 851 PG/ML (ref 211–911)
WBC # BLD: 5.6 THOU/MM3 (ref 4.8–10.8)
WBC UA: ABNORMAL /HPF

## 2018-03-09 PROCEDURE — 81001 URINALYSIS AUTO W/SCOPE: CPT

## 2018-03-09 PROCEDURE — 99285 EMERGENCY DEPT VISIT HI MDM: CPT

## 2018-03-09 PROCEDURE — 96361 HYDRATE IV INFUSION ADD-ON: CPT

## 2018-03-09 PROCEDURE — 36415 COLL VENOUS BLD VENIPUNCTURE: CPT

## 2018-03-09 PROCEDURE — G0378 HOSPITAL OBSERVATION PER HR: HCPCS

## 2018-03-09 PROCEDURE — 93306 TTE W/DOPPLER COMPLETE: CPT

## 2018-03-09 PROCEDURE — 95819 EEG AWAKE AND ASLEEP: CPT

## 2018-03-09 PROCEDURE — 93005 ELECTROCARDIOGRAM TRACING: CPT | Performed by: EMERGENCY MEDICINE

## 2018-03-09 PROCEDURE — 96375 TX/PRO/DX INJ NEW DRUG ADDON: CPT

## 2018-03-09 PROCEDURE — 93010 ELECTROCARDIOGRAM REPORT: CPT | Performed by: INTERNAL MEDICINE

## 2018-03-09 PROCEDURE — 99220 PR INITIAL OBSERVATION CARE/DAY 70 MINUTES: CPT | Performed by: PSYCHIATRY & NEUROLOGY

## 2018-03-09 PROCEDURE — 82607 VITAMIN B-12: CPT

## 2018-03-09 PROCEDURE — 6370000000 HC RX 637 (ALT 250 FOR IP): Performed by: EMERGENCY MEDICINE

## 2018-03-09 PROCEDURE — 6370000000 HC RX 637 (ALT 250 FOR IP): Performed by: INTERNAL MEDICINE

## 2018-03-09 PROCEDURE — 80053 COMPREHEN METABOLIC PANEL: CPT

## 2018-03-09 PROCEDURE — 99214 OFFICE O/P EST MOD 30 MIN: CPT | Performed by: INTERNAL MEDICINE

## 2018-03-09 PROCEDURE — 84484 ASSAY OF TROPONIN QUANT: CPT

## 2018-03-09 PROCEDURE — 85379 FIBRIN DEGRADATION QUANT: CPT

## 2018-03-09 PROCEDURE — 99220 PR INITIAL OBSERVATION CARE/DAY 70 MINUTES: CPT | Performed by: INTERNAL MEDICINE

## 2018-03-09 PROCEDURE — 96374 THER/PROPH/DIAG INJ IV PUSH: CPT

## 2018-03-09 PROCEDURE — 93005 ELECTROCARDIOGRAM TRACING: CPT | Performed by: INTERNAL MEDICINE

## 2018-03-09 PROCEDURE — 2580000003 HC RX 258: Performed by: EMERGENCY MEDICINE

## 2018-03-09 PROCEDURE — 70450 CT HEAD/BRAIN W/O DYE: CPT

## 2018-03-09 PROCEDURE — 82746 ASSAY OF FOLIC ACID SERUM: CPT

## 2018-03-09 PROCEDURE — 70551 MRI BRAIN STEM W/O DYE: CPT

## 2018-03-09 PROCEDURE — 87804 INFLUENZA ASSAY W/OPTIC: CPT

## 2018-03-09 PROCEDURE — 6360000002 HC RX W HCPCS: Performed by: EMERGENCY MEDICINE

## 2018-03-09 PROCEDURE — 72100 X-RAY EXAM L-S SPINE 2/3 VWS: CPT

## 2018-03-09 PROCEDURE — 85025 COMPLETE CBC W/AUTO DIFF WBC: CPT

## 2018-03-09 PROCEDURE — 83090 ASSAY OF HOMOCYSTEINE: CPT

## 2018-03-09 PROCEDURE — 2580000003 HC RX 258: Performed by: INTERNAL MEDICINE

## 2018-03-09 RX ORDER — ESCITALOPRAM OXALATE 10 MG/1
10 TABLET ORAL DAILY
Status: DISCONTINUED | OUTPATIENT
Start: 2018-03-09 | End: 2018-03-10 | Stop reason: HOSPADM

## 2018-03-09 RX ORDER — SODIUM CHLORIDE 9 MG/ML
INJECTION, SOLUTION INTRAVENOUS CONTINUOUS
Status: ACTIVE | OUTPATIENT
Start: 2018-03-09 | End: 2018-03-10

## 2018-03-09 RX ORDER — METOCLOPRAMIDE HYDROCHLORIDE 5 MG/ML
10 INJECTION INTRAMUSCULAR; INTRAVENOUS ONCE
Status: COMPLETED | OUTPATIENT
Start: 2018-03-09 | End: 2018-03-09

## 2018-03-09 RX ORDER — SODIUM CHLORIDE 0.9 % (FLUSH) 0.9 %
10 SYRINGE (ML) INJECTION EVERY 12 HOURS SCHEDULED
Status: DISCONTINUED | OUTPATIENT
Start: 2018-03-09 | End: 2018-03-10 | Stop reason: HOSPADM

## 2018-03-09 RX ORDER — MULTIVITAMIN WITH FOLIC ACID 400 MCG
1 TABLET ORAL DAILY
Status: DISCONTINUED | OUTPATIENT
Start: 2018-03-09 | End: 2018-03-10 | Stop reason: HOSPADM

## 2018-03-09 RX ORDER — ACETAMINOPHEN 325 MG/1
650 TABLET ORAL EVERY 4 HOURS PRN
Status: DISCONTINUED | OUTPATIENT
Start: 2018-03-09 | End: 2018-03-10 | Stop reason: HOSPADM

## 2018-03-09 RX ORDER — ONDANSETRON 2 MG/ML
4 INJECTION INTRAMUSCULAR; INTRAVENOUS EVERY 6 HOURS PRN
Status: DISCONTINUED | OUTPATIENT
Start: 2018-03-09 | End: 2018-03-10 | Stop reason: HOSPADM

## 2018-03-09 RX ORDER — MECLIZINE HYDROCHLORIDE CHEWABLE TABLETS 25 MG/1
25 TABLET, CHEWABLE ORAL ONCE
Status: COMPLETED | OUTPATIENT
Start: 2018-03-09 | End: 2018-03-09

## 2018-03-09 RX ORDER — BIOTIN 1 MG
10000 TABLET ORAL DAILY
Status: DISCONTINUED | OUTPATIENT
Start: 2018-03-09 | End: 2018-03-09 | Stop reason: CLARIF

## 2018-03-09 RX ORDER — KETOROLAC TROMETHAMINE 30 MG/ML
30 INJECTION, SOLUTION INTRAMUSCULAR; INTRAVENOUS ONCE
Status: COMPLETED | OUTPATIENT
Start: 2018-03-09 | End: 2018-03-09

## 2018-03-09 RX ORDER — 0.9 % SODIUM CHLORIDE 0.9 %
1000 INTRAVENOUS SOLUTION INTRAVENOUS ONCE
Status: COMPLETED | OUTPATIENT
Start: 2018-03-09 | End: 2018-03-09

## 2018-03-09 RX ORDER — LEVOTHYROXINE SODIUM 0.07 MG/1
225 TABLET ORAL DAILY
Status: DISCONTINUED | OUTPATIENT
Start: 2018-03-09 | End: 2018-03-10 | Stop reason: HOSPADM

## 2018-03-09 RX ORDER — LANOLIN ALCOHOL/MO/W.PET/CERES
1000 CREAM (GRAM) TOPICAL DAILY
Status: DISCONTINUED | OUTPATIENT
Start: 2018-03-09 | End: 2018-03-10 | Stop reason: HOSPADM

## 2018-03-09 RX ORDER — CALCIUM CARBONATE 500(1250)
500 TABLET ORAL 3 TIMES DAILY
Status: DISCONTINUED | OUTPATIENT
Start: 2018-03-09 | End: 2018-03-10 | Stop reason: HOSPADM

## 2018-03-09 RX ORDER — SODIUM CHLORIDE 0.9 % (FLUSH) 0.9 %
10 SYRINGE (ML) INJECTION PRN
Status: DISCONTINUED | OUTPATIENT
Start: 2018-03-09 | End: 2018-03-10 | Stop reason: HOSPADM

## 2018-03-09 RX ORDER — 0.9 % SODIUM CHLORIDE 0.9 %
500 INTRAVENOUS SOLUTION INTRAVENOUS ONCE
Status: COMPLETED | OUTPATIENT
Start: 2018-03-09 | End: 2018-03-09

## 2018-03-09 RX ADMIN — METOCLOPRAMIDE 10 MG: 5 INJECTION, SOLUTION INTRAMUSCULAR; INTRAVENOUS at 08:38

## 2018-03-09 RX ADMIN — KETOROLAC TROMETHAMINE 30 MG: 30 INJECTION, SOLUTION INTRAMUSCULAR at 08:39

## 2018-03-09 RX ADMIN — ESCITALOPRAM OXALATE 10 MG: 10 TABLET ORAL at 15:19

## 2018-03-09 RX ADMIN — SODIUM CHLORIDE 1000 ML: 9 INJECTION, SOLUTION INTRAVENOUS at 08:35

## 2018-03-09 RX ADMIN — ACETAMINOPHEN 650 MG: 325 TABLET ORAL at 14:22

## 2018-03-09 RX ADMIN — LEVOTHYROXINE SODIUM 225 MCG: 0.07 TABLET ORAL at 15:21

## 2018-03-09 RX ADMIN — SODIUM CHLORIDE: 9 INJECTION, SOLUTION INTRAVENOUS at 22:32

## 2018-03-09 RX ADMIN — SODIUM CHLORIDE 500 ML: 9 INJECTION, SOLUTION INTRAVENOUS at 16:31

## 2018-03-09 RX ADMIN — MECLIZINE HCL 25 MG: 25 TABLET, CHEWABLE ORAL at 08:57

## 2018-03-09 ASSESSMENT — PAIN DESCRIPTION - ONSET
ONSET_2: ON-GOING
ONSET: ON-GOING

## 2018-03-09 ASSESSMENT — PAIN SCALES - GENERAL
PAINLEVEL_OUTOF10: 4
PAINLEVEL_OUTOF10: 6
PAINLEVEL_OUTOF10: 6
PAINLEVEL_OUTOF10: 4
PAINLEVEL_OUTOF10: 6
PAINLEVEL_OUTOF10: 2

## 2018-03-09 ASSESSMENT — PAIN DESCRIPTION - DURATION: DURATION_2: CONTINUOUS

## 2018-03-09 ASSESSMENT — PAIN DESCRIPTION - LOCATION
LOCATION_2: BACK
LOCATION: HEAD
LOCATION_2: HIP
LOCATION: HEAD
LOCATION: HEAD

## 2018-03-09 ASSESSMENT — PAIN DESCRIPTION - DESCRIPTORS
DESCRIPTORS_2: ACHING
DESCRIPTORS: HEADACHE

## 2018-03-09 ASSESSMENT — PAIN DESCRIPTION - PAIN TYPE
TYPE: ACUTE PAIN
TYPE: ACUTE PAIN
TYPE_2: ACUTE PAIN

## 2018-03-09 ASSESSMENT — PAIN DESCRIPTION - ORIENTATION
ORIENTATION_2: RIGHT
ORIENTATION_2: LOWER
ORIENTATION: POSTERIOR

## 2018-03-09 ASSESSMENT — PAIN DESCRIPTION - INTENSITY
RATING_2: 4
RATING_2: 4

## 2018-03-09 ASSESSMENT — PAIN DESCRIPTION - FREQUENCY: FREQUENCY: CONTINUOUS

## 2018-03-09 NOTE — H&P
History & Physical        Patient:  Stacy Prasad  YOB: 1975    MRN: [id number]     Acct: [de-identified]    PCP: Susana Morgan MD    Date of Admission: 3/9/2018    Date of Service: Pt seen/examined on 3/9/18. Placed in Observation. Chief Complaint:  Syncope an dcollpase      History Of Present Illness:      44 y.o. female who presented to [address] [place] [address] with a sudden onset of syncope and collapse. She was doing something in the [address]. Does not remember what happened to her before she collapsed but felt hot and diaphoretic when she woke up. She hurt her left head. Denied chest pain,sob,n/v/d neck pain,weakness of ext or abd pain,fever,dysuria or chills or leg pain or long distance travel hx. No witnessed SZ. No incontinence. She has an IUD. --for 3 yrs. She had a similar episode in . Past Medical History:          Diagnosis Date    Headache(784.0)     Hypertension     Infertility     Nausea & vomiting     with c birth    YIFAN on CPAP     Polycystic ovaries     PONV (postoperative nausea and vomiting)     PVC (premature ventricular contraction)     seen by Dr. Sloan Gonsales, echo done (normal) - decreased caffeine & PVC are less    Thyroid disease        Past Surgical History:          Procedure Laterality Date    APPENDECTOMY  2004     SECTION      CHOLECYSTECTOMY      CYST REMOVAL      ovarian    DILATION AND CURETTAGE OF UTERUS  2014    HYSTEROSCOPY  6/3/14    Dilation and Curettage    KNEE ARTHROSCOPY      OTHER SURGICAL HISTORY Right 2015    Right Knee Arthroscopy    SLEEVE GASTRECTOMY  10/10/2016    robotic    STOMACH SURGERY      gastric sleeve    THYROIDECTOMY Bilateral 2016    TOOTH EXTRACTION Right 13    right lower       Medications Prior to Admission:      Prior to Admission medications    Medication Sig Start Date End Date Taking?  Authorizing Provider   lansoprazole (PREVACID SOLUTAB) 30 MG disintegrating tablet Take 30 mg by mouth daily   Yes Historical Provider, MD   losartan (COZAAR) 100 MG tablet Take 1 tablet by mouth daily 11/1/17  Yes Leyda Mosquera MD   escitalopram (LEXAPRO) 10 MG tablet Take 1 tablet by mouth daily 11/1/17  Yes Leyda Mosquera MD   vitamin B-12 (CYANOCOBALAMIN) 1000 MCG tablet Take 1,000 mcg by mouth daily   Yes Historical Provider, MD   Biotin 1000 MCG TABS Take 10,000 mcg by mouth daily   Yes Historical Provider, MD   Cholecalciferol (VITAMIN D3) 70080 UNITS CAPS Take 1 capsule by mouth once a week  Patient taking differently: Take 1 capsule by mouth daily  1/11/17  Yes VINH Simeon   calcium carbonate (OSCAL) 500 MG TABS tablet Take 500 mg by mouth 3 times daily   Yes Historical Provider, MD   levothyroxine (SYNTHROID) 200 MCG tablet Take 225 mcg by mouth Daily   Yes Historical Provider, MD   multivitamin [facility]) per tablet Take 1 tablet by mouth daily. Yes Historical Provider, MD   lansoprazole (PREVACID) 30 MG delayed release capsule Take 1 capsule by mouth daily 11/1/17 3/1/18  Leyda Mosquera MD       Allergies:  Patient has no known allergies. Social History:      The patient currently lives at home    TOBACCO:   reports that she has never smoked. She has never used smokeless tobacco.  ETOH:   reports that she drinks alcohol. Family History:     Reviewed in detail and negative for DM, CAD, Cancer, CVA. Positive as follows:        Problem Relation Age of Onset    Hypertension Mother    [place]inda [place] Arthritis Mother     Other Mother      at [de-identified]    Obesity Father     Lung Cancer Father     Obesity Brother     Heart Attack Paternal Grandfather     Cancer Brother        Diet:  DIET CARDIAC;    REVIEW OF SYSTEMS:   Pertinent positives as noted in the HPI. All other systems reviewed and negative.     PHYSICAL EXAM:    /81   Pulse 70   Temp 97.6 °F (36.4 °C) (Oral)   Resp 16   Ht 5' 6\" (1.676 m)   Wt 275 lb 3.2 oz (124.8 kg)   SpO2 96%   BMI 44.42 kg/m²     General appearance:

## 2018-03-09 NOTE — ED PROVIDER NOTES
tenderness. Cardiovascular:  Normal heart rate, Normal rhythm, No murmurs, No rubs, No gallops. GI:  No reproducible pain, Bowel sounds normal, Soft, No masses, No pulsatile masses. No tenderness  Musculoskeletal:  Intact distal pulses, No edema, No tenderness, No cyanosis, No clubbing. Good range of motion in all major joints. No tenderness to palpation or major deformities noted. Back: Lumbar and paralumbar pain tenderness. Integument:  Warm, Dry, No erythema, No rash (on exposed areas)   Lymphatic:  No lymphadenopathy noted. Neurologic:  Alert & oriented x 3, Normal motor function, Normal sensory function, No focal deficits noted. Straight legs nl  Psychiatric:  Affect normal, Judgment normal, Mood normal.     EKG    Sinus rate and rhythm without ischemia or infarction                  RADIOLOGY    XR LUMBAR SPINE (2-3 VIEWS)   Final Result   1. No acute fracture of the lumbar spine. 2. Multilevel degenerative disc disease and posterior facet arthropathy as detailed above. 3. Anterolisthesis of L5 on S1, likely secondary to spondylolysis. Final report electronically signed by Dr. Cathy Luo on 3/9/2018 8:17 AM      CT Head WO Contrast   Final Result   No mass effect or acute hemorrhage. **This report has been created using voice recognition software. It may contain minor errors which are inherent in voice recognition technology. **      Final report electronically signed by Dr. Cathy Luo on 3/9/2018 8:14 AM          PROCEDURES    none      CONSULTS:  None      CRITICAL CARE:  None    SCREENINGS  /60   Pulse 64   Temp 98.2 °F (36.8 °C)   Resp 14   Ht 5' 6\" (1.676 m)   Wt 270 lb (122.5 kg)   SpO2 100%   BMI 43.58 kg/m²        Screening For Tobacco Use and Cessation Intervention (#226):   reports that she has never smoked.  She has never used smokeless tobacco.  Non-smoker not applicable for screen        ED COURSE & MEDICAL DECISION MAKING    Pertinent Labs &

## 2018-03-09 NOTE — ED NOTES
Patient is getting very anxious. Asking to have monitor removed due to anxiety. Dr. Isaak Saxena in to speak with patient about admission.       Romulo Murphy RN  03/09/18 2314

## 2018-03-10 VITALS
RESPIRATION RATE: 18 BRPM | SYSTOLIC BLOOD PRESSURE: 145 MMHG | DIASTOLIC BLOOD PRESSURE: 70 MMHG | BODY MASS INDEX: 44.23 KG/M2 | TEMPERATURE: 98.5 F | WEIGHT: 275.2 LBS | HEART RATE: 80 BPM | HEIGHT: 66 IN | OXYGEN SATURATION: 96 %

## 2018-03-10 PROBLEM — S06.0XAA CONCUSSION: Status: ACTIVE | Noted: 2018-03-10

## 2018-03-10 PROBLEM — R55 SYNCOPE: Status: RESOLVED | Noted: 2018-03-09 | Resolved: 2018-03-10

## 2018-03-10 PROBLEM — R55 SYNCOPE AND COLLAPSE: Status: RESOLVED | Noted: 2018-03-09 | Resolved: 2018-03-10

## 2018-03-10 LAB
ANION GAP SERPL CALCULATED.3IONS-SCNC: 10 MEQ/L (ref 8–16)
BUN BLDV-MCNC: 11 MG/DL (ref 7–22)
CALCIUM SERPL-MCNC: 8.5 MG/DL (ref 8.5–10.5)
CHLORIDE BLD-SCNC: 108 MEQ/L (ref 98–111)
CO2: 26 MEQ/L (ref 23–33)
CREAT SERPL-MCNC: 0.6 MG/DL (ref 0.4–1.2)
GFR SERPL CREATININE-BSD FRML MDRD: > 90 ML/MIN/1.73M2
GLUCOSE BLD-MCNC: 88 MG/DL (ref 70–108)
HCT VFR BLD CALC: 36.2 % (ref 37–47)
HEMOGLOBIN: 12 GM/DL (ref 12–16)
LV EF: 65 %
LVEF MODALITY: NORMAL
MCH RBC QN AUTO: 30.1 PG (ref 27–31)
MCHC RBC AUTO-ENTMCNC: 33.1 GM/DL (ref 33–37)
MCV RBC AUTO: 91 FL (ref 81–99)
PDW BLD-RTO: 14.2 % (ref 11.5–14.5)
PLATELET # BLD: 222 THOU/MM3 (ref 130–400)
PMV BLD AUTO: 8.3 FL (ref 7.4–10.4)
POTASSIUM REFLEX MAGNESIUM: 4.2 MEQ/L (ref 3.5–5.2)
RBC # BLD: 3.97 MILL/MM3 (ref 4.2–5.4)
SODIUM BLD-SCNC: 144 MEQ/L (ref 135–145)
TROPONIN T: < 0.01 NG/ML
TSH SERPL DL<=0.05 MIU/L-ACNC: 2.28 UIU/ML (ref 0.4–4.2)
WBC # BLD: 6.9 THOU/MM3 (ref 4.8–10.8)

## 2018-03-10 PROCEDURE — 6370000000 HC RX 637 (ALT 250 FOR IP): Performed by: INTERNAL MEDICINE

## 2018-03-10 PROCEDURE — 99217 PR OBSERVATION CARE DISCHARGE MANAGEMENT: CPT | Performed by: HOSPITALIST

## 2018-03-10 PROCEDURE — G0378 HOSPITAL OBSERVATION PER HR: HCPCS

## 2018-03-10 PROCEDURE — 3430000000 HC RX DIAGNOSTIC RADIOPHARMACEUTICAL: Performed by: HOSPITALIST

## 2018-03-10 PROCEDURE — 78452 HT MUSCLE IMAGE SPECT MULT: CPT

## 2018-03-10 PROCEDURE — 84443 ASSAY THYROID STIM HORMONE: CPT

## 2018-03-10 PROCEDURE — 36415 COLL VENOUS BLD VENIPUNCTURE: CPT

## 2018-03-10 PROCEDURE — 2580000003 HC RX 258: Performed by: INTERNAL MEDICINE

## 2018-03-10 PROCEDURE — 93017 CV STRESS TEST TRACING ONLY: CPT

## 2018-03-10 PROCEDURE — A9500 TC99M SESTAMIBI: HCPCS | Performed by: HOSPITALIST

## 2018-03-10 PROCEDURE — 80048 BASIC METABOLIC PNL TOTAL CA: CPT

## 2018-03-10 PROCEDURE — 85027 COMPLETE CBC AUTOMATED: CPT

## 2018-03-10 RX ORDER — ASPIRIN 81 MG/1
81 TABLET, CHEWABLE ORAL DAILY
Status: DISCONTINUED | OUTPATIENT
Start: 2018-03-10 | End: 2018-03-10 | Stop reason: HOSPADM

## 2018-03-10 RX ADMIN — Medication 31 MILLICURIE: at 10:50

## 2018-03-10 RX ADMIN — SODIUM CHLORIDE: 9 INJECTION, SOLUTION INTRAVENOUS at 07:29

## 2018-03-10 RX ADMIN — Medication 9.1 MILLICURIE: at 09:47

## 2018-03-10 RX ADMIN — ACETAMINOPHEN 650 MG: 325 TABLET ORAL at 12:01

## 2018-03-10 RX ADMIN — LEVOTHYROXINE SODIUM 225 MCG: 0.07 TABLET ORAL at 07:30

## 2018-03-10 RX ADMIN — Medication 10 ML: at 12:02

## 2018-03-10 RX ADMIN — ESCITALOPRAM OXALATE 10 MG: 10 TABLET ORAL at 12:00

## 2018-03-10 ASSESSMENT — PAIN DESCRIPTION - LOCATION
LOCATION: HEAD

## 2018-03-10 ASSESSMENT — PAIN DESCRIPTION - PAIN TYPE
TYPE: ACUTE PAIN

## 2018-03-10 ASSESSMENT — PAIN DESCRIPTION - DESCRIPTORS
DESCRIPTORS: HEADACHE

## 2018-03-10 ASSESSMENT — PAIN SCALES - GENERAL
PAINLEVEL_OUTOF10: 5
PAINLEVEL_OUTOF10: 3
PAINLEVEL_OUTOF10: 4
PAINLEVEL_OUTOF10: 5

## 2018-03-10 NOTE — CONSULTS
disintegrating tablet Take 30 mg by mouth daily   Yes Historical Provider, MD   losartan (COZAAR) 100 MG tablet Take 1 tablet by mouth daily 11/1/17  Yes Karissa Amanda MD   escitalopram (LEXAPRO) 10 MG tablet Take 1 tablet by mouth daily 11/1/17  Yes Karissa Amanda MD   vitamin B-12 (CYANOCOBALAMIN) 1000 MCG tablet Take 1,000 mcg by mouth daily   Yes Historical Provider, MD   Biotin 1000 MCG TABS Take 10,000 mcg by mouth daily   Yes Historical Provider, MD   Cholecalciferol (VITAMIN D3) 84599 UNITS CAPS Take 1 capsule by mouth once a week  Patient taking differently: Take 1 capsule by mouth daily  1/11/17  Yes VINH Soto   calcium carbonate (OSCAL) 500 MG TABS tablet Take 500 mg by mouth 3 times daily   Yes Historical Provider, MD   levothyroxine (SYNTHROID) 200 MCG tablet Take 225 mcg by mouth Daily   Yes Historical Provider, MD   multivitamin [facility]) per tablet Take 1 tablet by mouth daily.    Yes Historical Provider, MD   lansoprazole (PREVACID) 30 MG delayed release capsule Take 1 capsule by mouth daily 11/1/17 3/1/18  Karissa Amanda MD       Current Facility-Administered Medications   Medication Dose Route Frequency Provider Last Rate Last Dose    sodium chloride flush 0.9 % injection 10 mL  10 mL Intravenous 2 times per day Tahira Willingham MD        sodium chloride flush 0.9 % injection 10 mL  10 mL Intravenous PRN Tahira Willingham MD        acetaminophen (TYLENOL) tablet 650 mg  650 mg Oral Q4H PRN Tahira Willingham MD   650 mg at 03/09/18 1422    magnesium hydroxide (MILK OF MAGNESIA) 400 MG/5ML suspension 30 mL  30 mL Oral Daily PRN Tahira Willingham MD        ondansetron [facility]) injection 4 mg  4 mg Intravenous Q6H PRN Tahira Willingham MD        enoxaparin (LOVENOX) injection 40 mg  40 mg Subcutaneous Q24H Tahira Willingham MD        0.9 % sodium chloride infusion   Intravenous Continuous Tahira Willingham  mL/hr at 03/09/18 1948      calcium elemental (OSCAL) tablet 500 mg 500 mg Oral TID Ana Silva MD        escitalopram (LEXAPRO) tablet 10 mg  10 mg Oral Daily Ana Silva MD   10 mg at 03/09/18 1519    lansoprazole (PREVACID SOLUTAB) disintegrating tablet 30 mg  30 mg Oral Daily Ana Silva MD   30 mg at 03/09/18 1520    levothyroxine (SYNTHROID) tablet 225 mcg  225 mcg Oral Daily Ana Silva MD   225 mcg at 03/09/18 1521    multivitamin 1 tablet  1 tablet Oral Daily Ana Silva MD        vitamin B-12 (CYANOCOBALAMIN) tablet 1,000 mcg  1,000 mcg Oral Daily Ana Silva MD           Allergies:  Patient has no known allergies. Social History:    TOBACCO:   reports that she has never smoked. She has never used smokeless tobacco.  ETOH:   reports that she drinks alcohol. Family History:        Problem Relation Age of Onset    Hypertension Mother    [facility] Arth[facility] Mother     Other Mother      at [facility]    Obesity Father     Lung Cancer Father     Obesity Brother     Heart Attack Paternal Grandfather     Cancer Brother          Review of Systems -   General ROS: negative  Psychological ROS: negative  Hematological and Lymphatic ROS: No history of blood clots or bleeding disorder. Respiratory ROS: no cough, shortness of breath, or wheezing  Cardiovascular ROS: no chest pain or dyspnea on exertion  Gastrointestinal ROS: negative  Genito-Urinary ROS: no dysuria, trouble voiding, or hematuria  Musculoskeletal ROS: negative  Neurological ROS: no TIA or stroke symptoms; +syncope  Dermatological ROS: negative    All others reviewed and are negative.        /64   Pulse 74   Temp 99 °F (37.2 °C) (Oral)   Resp 16   Ht 5' 6\" (1.676 m)   Wt 275 lb 3.2 oz (124.8 kg)   SpO2 94%   BMI 44.42 kg/m²       Physical Examination:   General appearance - obese, alert, well appearing, and in no distress  Mental status - alert, oriented to person, place, and time  Neck - supple, no significant adenopathy, no JVD, or carotid bruits  Chest - clear to auscultation, no wheezes, rales or rhonchi, symmetric air entry  Heart - normal rate, regular rhythm, normal S1, S2, SM 1/6, rubs, clicks or gallops  Abdomen - soft, nontender, nondistended, no masses or organomegaly  Neurological - alert, oriented, normal speech, no focal findings or movement disorder noted  Musculoskeletal - no joint tenderness, deformity or swelling  Extremities - peripheral pulses normal, no pedal edema, no clubbing or cyanosis  Skin - normal coloration and turgor, no rashes, no suspicious skin lesions noted      LABS:    Recent Labs      03/09/18   1821   TROPONINT  < 0.010     CBC:   Lab Results   Component Value Date    WBC 5.6 03/09/2018    RBC 4.45 03/09/2018    RBC 3.31 06/01/2012    HGB 13.5 03/09/2018    HCT 40.0 03/09/2018    MCV 89.7 03/09/2018    MCH 30.3 03/09/2018    MCHC 33.8 03/09/2018    RDW 11.5 03/09/2018     03/09/2018    MPV 7.3 03/09/2018     BMP:    Lab Results   Component Value Date     03/09/2018    K 4.0 03/09/2018     03/09/2018    CO2 33 03/09/2018    BUN 10 03/09/2018    LABALBU 3.4 03/09/2018    LABALBU 3.8 05/12/2012    CREATININE 0.6 03/09/2018    CALCIUM 9.0 10/05/2017    LABGLOM >90 10/05/2017    GLUCOSE 90 03/09/2018    GLUCOSE 87 05/12/2012     Hepatic Function Panel:    Lab Results   Component Value Date    ALKPHOS 53 03/09/2018    ALT 26 03/09/2018    AST 20 03/09/2018    PROT 6.6 03/09/2018    BILITOT 0.4 03/09/2018    BILITOT NEGATIVE 08/23/2011    LABALBU 3.4 03/09/2018    LABALBU 3.8 05/12/2012     Magnesium:    Lab Results   Component Value Date    MG 2.0 02/12/2014     Warfarin PT/INR:  No components found for: PTPATWAR, PTINRWAR  HgBA1c:    Lab Results   Component Value Date    LABA1C 4.9 10/05/2017     FLP:    Lab Results   Component Value Date    TRIG 35 10/05/2017    HDL 51 10/05/2017    LDLCALC 98 10/05/2017     TSH:    Lab Results   Component Value Date    TSH 2.420 10/28/2017     BNP: No results found for: BNP    EKG: NSR    Echo:  3/9/18:   Summary   Normal left ventricle size and systolic function. Ejection fraction was   estimated at 60 to 65 %. There were no regional left ventricular wall   motion abnormalities and wall thickness was within normal limits.   Doppler parameters were consistent with abnormal left ventricular   relaxation (grade 1 diastolic dysfunction).   Mild tricuspid regurgitation visualized.   Right ventricular systolic pressure measures 40 mmhg.   No right to left shunt noted on bubble study with agitated saline. Stress test:    Cath:      Event moniotor: 4/1/16:     CONCLUSION:    1. Sinus rhythm. 2.    The patient's symptoms of palpitation did not correlate with any          specific event monitor findings. 3.    Pretty much unremarkable event monitor otherwise. Assessment/Plan:  Syncope      Reviewed EKG and prior Holter monitor/event monitor reports  No significant ectopies or arrhythmias  Echo shows normal LVSF/valves  Normal prior tilt table study  Discussed about possible Linq loop recorder implantation if all work up is negative  Follow up with neurology  Suggest IV hydration    Please do note hesitate to contact me for any further questions. Thank you for the opportunity to be involved in this patient's care.     Code Status: Full Code    Electronically signed by Asa Ambriz MD on 3/9/2018 at 9:33 PM

## 2018-03-10 NOTE — PLAN OF CARE
Problem: Pain:  Goal: Pain level will decrease  Pain level will decrease   Outcome: Ongoing  Patient voiced pain this shift at 4/10. Patient's pain goal is 0/10. RN continues to deliver PRN medication and attempts noninvasive methods such as repositioning. Pain is re-assessed frequently. Bed alarm set after pain meds given. Problem: Falls - Risk of  Goal: Absence of falls  Outcome: Ongoing  Patient free of falls this shift. Patient on falling star program. Fall band intact. RN visually checks on patient with hourly rounds. Patient tolerates ambulation each shift. Problem: Tissue Perfusion - Cardiopulmonary, Altered:  Goal: Hemodynamic stability will improve  Hemodynamic stability will improve   Outcome: Ongoing  Vitals monitored and recorded. Patient hemodynamically stable. Cardiac monitor in place with strips being read every four hours. Comments: Care plan reviewed with patient and spouse. Patient and spouse verbalize understanding of the plan of care and contribute to goal setting.

## 2018-03-10 NOTE — DISCHARGE SUMMARY
Hospital Medicine Discharge Summary      Patient Identification:   Nikos Yang   : 1975  MRN: [id number]   Account: [de-identified]      Patient's PCP: Max Blevins MD    Admit Date: 3/9/2018     Discharge Date:   3/10/2018    Admitting Physician: Sarah Lambert MD     Discharge Physician: Tim Luu MD     Discharge Diagnoses: Active Hospital Problems    Diagnosis Date Noted    Concussion [S06.0X9A] 03/10/2018    PCO (polycystic ovaries) [E28.2] 2018    Acquired hypothyroidism [E03.9] 2017    S/P laparoscopic sleeve gastrectomy [Z98.84]        The patient was seen and examined on day of discharge and this discharge summary is in conjunction with any daily progress note from day of discharge. Hospital Course:   45 y/o female with PMH of Acquired Hypothyroidism, S/p laparoscopic sleeve gastroectomy, Polycystic Ovaries, HTN, presents with a syncopal episode and collapse. She had a similar episode in . Stress, echo, event monitor and tilt table testing were negative. She remembers feeling hot and sweaty upon working. No loss of bowel or bladder function. Brother and niece have had similar episode. No cause found     Echo 60-65%  Stress completed- nonischemic  Ct Head- no acute process  MRI brain: \"No acute process, There are mild, patchy foci of hyperintense T2 signal throughout the deep cerebral white matter which are more numerous compared to the prior exam\"    She reports hitting her head hard, discussed MRI resulted with Neurology whom felt those changes on the MRI were due to her migraine history. EEG was also reported as unremarkable  She will need to follow up with Neurology and Cardiology. Recommend loop recorder with Cards as an outpatient.    She may have sustained a  Concussion with the fall, she has been given precautions on discharge    Exam:     Vitals:  Vitals:    18 2335 03/10/18 0501 03/10/18 0828 03/10/18 1149   BP: 131/63 (!) 125/58 138/70 (!) 145/70   Pulse: 69 61 60 80   Resp: 16 16 16 18   Temp: 98.8 °F (37.1 °C) 98.4 °F (36.9 °C) 98.6 °F (37 °C) 98.5 °F (36.9 °C)   TempSrc: Oral Oral Oral Oral   SpO2: 97% 96% 95% 96%   Weight:       Height:         Weight: Weight: 275 lb 3.2 oz (124.8 kg)     24 hour intake/output:  Intake/Output Summary (Last 24 hours) at 03/10/18 1536  Last data filed at 03/10/18 0503   Gross per 24 hour   Intake          3323.46 ml   Output                0 ml   Net          3323.46 ml         General appearance:  No apparent distress, appears stated age and cooperative. HEENT:  Normal cephalic, atraumatic without obvious deformity. Pupils equal, round, and reactive to light. Extra ocular muscles intact. Conjunctivae/corneas clear. Neck: Supple, with full range of motion. No jugular venous distention. Trachea midline. Respiratory:  Normal respiratory effort. Clear to auscultation, bilaterally without Rales/Wheezes/Rhonchi. Cardiovascular:  Regular rate and rhythm with normal S1/S2 without murmurs, rubs or gallops. Abdomen: Soft, non-tender, non-distended with normal bowel sounds. Musculoskeletal:  No clubbing, cyanosis or edema bilaterally. Full range of motion without deformity. Skin: Skin color, texture, turgor normal.  No rashes or lesions. Neurologic:  Neurovascularly intact without any focal sensory/motor deficits. Cranial nerves: II-XII intact, grossly non-focal.  Psychiatric:  Alert and oriented, thought content appropriate, normal insight  Capillary Refill: Brisk,< 3 seconds   Peripheral Pulses: +2 palpable, equal bilaterally       Labs:  For convenience and continuity at follow-up the following most recent labs are provided:      CBC:    Lab Results   Component Value Date    WBC 6.9 03/10/2018    HGB 12.0 03/10/2018    HCT 36.2 03/10/2018     03/10/2018       Renal:  Lab Results   Component Value Date     03/10/2018    K 4.2 03/10/2018     03/10/2018    CO2 26 03/10/2018    BUN 11 03/10/2018 CREATININE 0.6 03/10/2018    CALCIUM 8.5 03/10/2018         Significant Diagnostic Studies    Radiology:   MRI Brain WO Contrast   Final Result       1. No acute intracranial abnormality is identified. There are mild, patchy foci of hyperintense T2 signal throughout the deep cerebral white matter which are more numerous compared to the prior exam. These are nonspecific and may correspond to migraines    or sequela of prior trauma/inflammation versus sequela of chronic microvascular ischemic change. 2. Mucosal sinus disease is present with mucosal thickening in the bilateral ethmoid and maxillary sinuses. A mucous retention cyst or polyp is again noted in the left maxillary sinus. **This report has been created using voice recognition software. It may contain minor errors which are inherent in voice recognition technology. **      Final report electronically signed by Dr. Maribel De La Garza on 3/9/2018 4:36 PM      XR LUMBAR SPINE (2-3 VIEWS)   Final Result   1. No acute fracture of the lumbar spine. 2. Multilevel degenerative disc disease and posterior facet arthropathy as detailed above. 3. Anterolisthesis of L5 on S1, likely secondary to spondylolysis. Final report electronically signed by Dr. Frances Soliz on 3/9/2018 8:17 AM      CT Head WO Contrast   Final Result   No mass effect or acute hemorrhage. **This report has been created using voice recognition software. It may contain minor errors which are inherent in voice recognition technology. **      Final report electronically signed by Dr. Frances Soliz on 3/9/2018 8:14 AM             Consults:     STR ED TO IP CONSULT  IP CONSULT TO CARDIOLOGY  IP CONSULT TO NEUROLOGY    Disposition:    [x] Home       [] TCU       [] Rehab       [] Psych       [] SNF       [] [place]       [] Other-    Condition at Discharge: Stable    Code Status:  Full Code     Patient Instructions:    Discharge lab work:

## 2018-03-10 NOTE — PROGRESS NOTES
Temp 98.5 °F (36.9 °C) (Oral)   Resp 18   Ht 5' 6\" (1.676 m)   Wt 275 lb 3.2 oz (124.8 kg)   SpO2 96%   BMI 44.42 kg/m²      General appearance: No apparent distress, appears stated age and cooperative. HEENT: Pupils equal, round, and reactive to light. Conjunctivae/corneas clear. Neck: Supple, with full range of motion. No jugular venous distention. Trachea midline. Respiratory:  Normal respiratory effort. Clear to auscultation, bilaterally without Rales/Wheezes/Rhonchi. Cardiovascular: Regular rate and rhythm with normal S1/S2 without murmurs, rubs or gallops. Abdomen: Soft, non-tender, non-distended with normal bowel sounds. Musculoskeletal: passive and active ROM x 4 extremities. Skin: Skin color, texture, turgor normal.  No rashes or lesions. Neurologic:  Neurovascularly intact without any focal sensory/motor deficits. Cranial nerves: II-XII intact, grossly non-focal.  Psychiatric: Alert and oriented, thought content appropriate, normal insight  Capillary Refill: Brisk,< 3 seconds   Peripheral Pulses: +2 palpable, equal bilaterally       Labs:   Recent Labs      03/09/18   0722  03/10/18   0612   WBC  5.6  6.9   HGB  13.5  12.0   HCT  40.0  36.2*   PLT  220  222     Recent Labs      03/09/18   0722  03/10/18   0612   NA  141  144   K  4.0  4.2   CL  103  108   CO2  33*  26   BUN  10  11   CREATININE  0.6  0.6   CALCIUM   --   8.5     Recent Labs      03/09/18   0722   AST  20   ALT  26   BILITOT  0.4   ALKPHOS  53     No results for input(s): INR in the last 72 hours. Recent Labs      03/09/18   0722   TROPONINI  < 0.017       Urinalysis:    Lab Results   Component Value Date    NITRU NEGATIVE 03/09/2018    WBCUA NONE 03/09/2018    BACTERIA FEW 03/09/2018    RBCUA NONE 03/09/2018    RBCUA PACKED 07/28/2013    BLOODU NEGATIVE 03/09/2018    SPECGRAV 1.020 03/09/2018       Radiology:  MRI Brain WO Contrast   Final Result       1. No acute intracranial abnormality is identified.  There are mild, patchy foci of hyperintense T2 signal throughout the deep cerebral white matter which are more numerous compared to the prior exam. These are nonspecific and may correspond to migraines    or sequela of prior trauma/inflammation versus sequela of chronic microvascular ischemic change. 2. Mucosal sinus disease is present with mucosal thickening in the bilateral ethmoid and maxillary sinuses. A mucous retention cyst or polyp is again noted in the left maxillary sinus. **This report has been created using voice recognition software. It may contain minor errors which are inherent in voice recognition technology. **      Final report electronically signed by Dr. Tera Augustine on 3/9/2018 4:36 PM      XR LUMBAR SPINE (2-3 VIEWS)   Final Result   1. No acute fracture of the lumbar spine. 2. Multilevel degenerative disc disease and posterior facet arthropathy as detailed above. 3. Anterolisthesis of L5 on S1, likely secondary to spondylolysis. Final report electronically signed by Dr. Crissy Brewster on 3/9/2018 8:17 AM      CT Head WO Contrast   Final Result   No mass effect or acute hemorrhage. **This report has been created using voice recognition software. It may contain minor errors which are inherent in voice recognition technology. **      Final report electronically signed by Dr. Crissy Brewster on 3/9/2018 8:14 AM          Diet: DIET CARDIAC;    DVT prophylaxis: [x] Lovenox                                 [] SCDs                                 [] SQ Heparin                                 [] Encourage ambulation           [] Already on Anticoagulation     Disposition:    [x] Home       [] TCU       [] Rehab       [] Psych       [] SNF       [] [facility]       [] Other-    Code Status: Full Code    PT/OT Eval Status: ongoing    Assessment/Plan:    Anticipated Discharge in : TBA    Active Hospital Problems    Diagnosis Date Noted    Syncope [R55] 03/09/2018    PCO

## 2018-03-10 NOTE — PLAN OF CARE
Problem: Discharge Planning:  Goal: Discharged to appropriate level of care  Discharged to appropriate level of care   Outcome: Ongoing  Patient discharged per Dr. Hui Gutierres. Comments: Care plan reviewed with patient. Patient verbalizes understanding of the plan of care and contribute to goal setting.

## 2018-03-11 ENCOUNTER — CARE COORDINATION (OUTPATIENT)
Dept: CASE MANAGEMENT | Age: 43
End: 2018-03-11

## 2018-03-11 LAB — HOMOCYSTEINE, TOTAL: 6 UMOL/L

## 2018-03-12 ENCOUNTER — HOSPITAL ENCOUNTER (OUTPATIENT)
Dept: NON INVASIVE DIAGNOSTICS | Age: 43
Discharge: HOME | End: 2018-03-12
Payer: COMMERCIAL

## 2018-03-12 ENCOUNTER — TELEPHONE (OUTPATIENT)
Dept: FAMILY MEDICINE CLINIC | Age: 43
End: 2018-03-12

## 2018-03-12 PROCEDURE — 93660 TILT TABLE EVALUATION: CPT | Performed by: INTERNAL MEDICINE

## 2018-03-13 ENCOUNTER — OFFICE VISIT (OUTPATIENT)
Dept: FAMILY MEDICINE CLINIC | Age: 43
End: 2018-03-13
Payer: COMMERCIAL

## 2018-03-13 VITALS
WEIGHT: 271.6 LBS | BODY MASS INDEX: 43.65 KG/M2 | DIASTOLIC BLOOD PRESSURE: 90 MMHG | RESPIRATION RATE: 20 BRPM | SYSTOLIC BLOOD PRESSURE: 146 MMHG | OXYGEN SATURATION: 97 % | TEMPERATURE: 98.4 F | HEART RATE: 61 BPM | HEIGHT: 66 IN

## 2018-03-13 DIAGNOSIS — R55 SYNCOPE, UNSPECIFIED SYNCOPE TYPE: ICD-10-CM

## 2018-03-13 DIAGNOSIS — R51.9 ACUTE INTRACTABLE HEADACHE, UNSPECIFIED HEADACHE TYPE: ICD-10-CM

## 2018-03-13 DIAGNOSIS — R11.0 NAUSEA: ICD-10-CM

## 2018-03-13 DIAGNOSIS — I10 ESSENTIAL HYPERTENSION: ICD-10-CM

## 2018-03-13 DIAGNOSIS — S09.90XD CLOSED HEAD INJURY, SUBSEQUENT ENCOUNTER: Primary | ICD-10-CM

## 2018-03-13 PROCEDURE — 1111F DSCHRG MED/CURRENT MED MERGE: CPT | Performed by: FAMILY MEDICINE

## 2018-03-13 PROCEDURE — 96372 THER/PROPH/DIAG INJ SC/IM: CPT | Performed by: FAMILY MEDICINE

## 2018-03-13 PROCEDURE — 99495 TRANSJ CARE MGMT MOD F2F 14D: CPT | Performed by: FAMILY MEDICINE

## 2018-03-13 RX ORDER — KETOROLAC TROMETHAMINE 30 MG/ML
60 INJECTION, SOLUTION INTRAMUSCULAR; INTRAVENOUS ONCE
Status: COMPLETED | OUTPATIENT
Start: 2018-03-13 | End: 2018-03-13

## 2018-03-13 RX ORDER — TRIAMCINOLONE ACETONIDE 40 MG/ML
60 INJECTION, SUSPENSION INTRA-ARTICULAR; INTRAMUSCULAR ONCE
Status: COMPLETED | OUTPATIENT
Start: 2018-03-13 | End: 2018-03-13

## 2018-03-13 RX ORDER — ONDANSETRON 4 MG/1
4 TABLET, ORALLY DISINTEGRATING ORAL EVERY 8 HOURS PRN
Qty: 20 TABLET | Refills: 0 | Status: SHIPPED | OUTPATIENT
Start: 2018-03-13 | End: 2020-05-19 | Stop reason: SDUPTHER

## 2018-03-13 RX ADMIN — KETOROLAC TROMETHAMINE 60 MG: 30 INJECTION, SOLUTION INTRAMUSCULAR; INTRAVENOUS at 16:24

## 2018-03-13 RX ADMIN — TRIAMCINOLONE ACETONIDE 60 MG: 40 INJECTION, SUSPENSION INTRA-ARTICULAR; INTRAMUSCULAR at 16:00

## 2018-03-13 NOTE — PROGRESS NOTES
After obtaining consent, and per orders of Dr. Jennifer Vergara, injection of Kenalog 60 mg was given IM in Right upper quad. gluteus by Jared Farrar. Patient tolerated well and was instructed to remain in clinic for 20 minutes afterwards, and to report any adverse reaction to me immediately. Patient left office with no apparent reaction.

## 2018-03-13 NOTE — PROCEDURES
[address] [place] [address]                                  TILT TABLE TEST    PATIENT NAME: Staci Contreras                       :        1975  MED REC NO:   [id number]                           ROOM:  ACCOUNT NO:   [de-identified]                           ADMIT DATE: 2018  PROVIDER:     Braden Hou MD    DATE OF STUDY:  2018    REFERRING PROVIDER:  Boubacar Morales M.D. INDICATION FOR STUDY:  The patient underwent a tilt table study for 30  minutes. At the start of the study, resting blood pressure was 169/85 and  heart rate was 54. At the end of the study, blood pressure was 135/78 and  heart rate of 53. During the study, the lowest systolic blood pressure was 132 and lowest  heart rate was 53. The patient did not experience any symptoms during the study. CONCLUSION:  Normal tilt table study.         Evelin Urbina MD    D: 2018 16:40:37       T: 2018 17:05:57     [email or web address]  Job#: [id number]     Doc#: [id number]    CC:

## 2018-03-14 ENCOUNTER — CARE COORDINATION (OUTPATIENT)
Dept: CASE MANAGEMENT | Age: 43
End: 2018-03-14

## 2018-03-14 NOTE — CARE COORDINATION
[facility] Transitions Follow Up Call    3/14/2018    Patient: Ana Nails  Patient : 1975   MRN: [id number]  Reason for Admission: syncope and collapse  Discharge Date: 3/10/18 RARS: Risk Score: 1.25 CMRS 4       Spoke with: [facility] Transitions Subsequent and Final Call    Subsequent and Final Calls  Do you have any ongoing symptoms?:  No  Have your medications changed?:  No  Do you have any questions related to your medications?:  No  Do you currently have any active services?:  No  Do you have any needs or concerns that I can assist you with?:  No  Identified Barriers:  None  Care Transitions Interventions  No Identified Needs  Other Interventions:        Spoke with Rodrigue Garibay for follow up care transition call. She reports feeling \"good\" today. She denies any headaches or dizziness today. She stated she received a toradol shot at her TCM visit yesterday and that helped her headache. She reports that she is returning to work tomorrow. She denies any concerns or questions and kindly declines the need for further follow up.       Follow Up  Future Appointments  Date Time Provider Jovanna Quintero   3/27/2018 8:00 AM MD [facility] - Eva Love   2018 9:45 AM Jabari Poole RD, PATRICK [facility] - Eva Love   2018 10:15 AM VINH Ivy [facility] - Eva Love   2018 7:45 AM Cecile Lantigua MD [facility] - Jose Enriquez RN

## 2018-03-20 VITALS — BODY MASS INDEX: 44.79 KG/M2 | WEIGHT: 286 LBS

## 2018-03-27 ENCOUNTER — OFFICE VISIT (OUTPATIENT)
Dept: FAMILY MEDICINE CLINIC | Age: 43
End: 2018-03-27
Payer: COMMERCIAL

## 2018-03-27 VITALS
TEMPERATURE: 97.9 F | DIASTOLIC BLOOD PRESSURE: 84 MMHG | OXYGEN SATURATION: 98 % | RESPIRATION RATE: 16 BRPM | WEIGHT: 271.2 LBS | BODY MASS INDEX: 43.58 KG/M2 | SYSTOLIC BLOOD PRESSURE: 132 MMHG | HEIGHT: 66 IN | HEART RATE: 67 BPM

## 2018-03-27 DIAGNOSIS — G44.209 TENSION HEADACHE: ICD-10-CM

## 2018-03-27 DIAGNOSIS — I10 ESSENTIAL HYPERTENSION: Primary | ICD-10-CM

## 2018-03-27 PROCEDURE — 99214 OFFICE O/P EST MOD 30 MIN: CPT | Performed by: FAMILY MEDICINE

## 2018-03-27 ASSESSMENT — ENCOUNTER SYMPTOMS
RHINORRHEA: 0
COUGH: 0
WHEEZING: 0
DIARRHEA: 0
EYE DISCHARGE: 0
ABDOMINAL PAIN: 0
NAUSEA: 0
SORE THROAT: 0
SHORTNESS OF BREATH: 0
CONSTIPATION: 0

## 2018-03-27 ASSESSMENT — PATIENT HEALTH QUESTIONNAIRE - PHQ9
SUM OF ALL RESPONSES TO PHQ9 QUESTIONS 1 & 2: 0
SUM OF ALL RESPONSES TO PHQ QUESTIONS 1-9: 0
1. LITTLE INTEREST OR PLEASURE IN DOING THINGS: 0
2. FEELING DOWN, DEPRESSED OR HOPELESS: 0

## 2018-03-27 NOTE — PROGRESS NOTES
Payton Romano  [address] [place] [address]  Dept: [phone]  Dept Fax: [phone]  Loc: [phone]    Orlando Carrasco is a 44 y.o. female who presents today for her medical conditions/complaints as noted below. Chief Complaint   Patient presents with    2 Week Follow-Up     HTN           HPI:     Patient presents for two-week follow-up of high blood pressure. Blood pressure is well controlled today. She has had no more syncopal episodes since her previous hospital stay. She is post a follow-up with neurology and cardiology but has not done so yet. She states that she still gets occasional tension type headaches but they respond to Tylenol. She states that she does not wake up with a headache but usually has a headache during the day if she is at work and stressed. Otherwise she is sleeping well and eating well and has no other complaints today.         Past Medical History:   Diagnosis Date    Headache(784.0)     Hypertension     Infertility     Nausea & vomiting     with c birth    YIFAN on CPAP     Polycystic ovaries     PONV (postoperative nausea and vomiting)     PVC (premature ventricular contraction)     seen by Dr. Carolyn Isaac, echo done (normal) - decreased caffeine & PVC are less    Thyroid disease       Past Surgical History:   Procedure Laterality Date    APPENDECTOMY  2004     SECTION      CHOLECYSTECTOMY      CYST REMOVAL      ovarian    DILATION AND CURETTAGE OF UTERUS  2014    HYSTEROSCOPY  6/3/14    Dilation and Curettage    KNEE ARTHROSCOPY      OTHER SURGICAL HISTORY Right 2015    Right Knee Arthroscopy    SLEEVE GASTRECTOMY  10/10/2016    robotic    STOMACH SURGERY      gastric sleeve    THYROIDECTOMY Bilateral 2016    TOOTH EXTRACTION Right 13    right lower       Family History   Problem Relation Age of Onset    Hypertension Mother    Tye Brownlee Arthritis Mother    Tye Brownlee Other medication is desired at this time. Follow-up in 6 months or sooner as needed. Discussed use, benefit, and side effects of prescribed medications. Barriers to medication compliance addressed. All patient questions answered. Pt voiced understanding. No Follow-up on file. No orders of the defined types were placed in this encounter. No orders of the defined types were placed in this encounter. Reccommended tobacco cessation options including pharmacologic methods, counseled great than 3 minutes during this visit:  Yes  []  No  []     Patient given educational materials - see patient instructions. Discussed use, benefit, and side effects of prescribed medications. All patient questions answered. Pt voiced understanding. Reviewed health maintenance. Instructed to continue current medications, diet and exercise. Patient agreed with treatment plan. Follow up as directed.      Electronically signed by Lavell Zamora MD on 3/27/2018 at 10:19 AM

## 2018-05-31 LAB
CHOLESTEROL, TOTAL: 152 MG/DL (ref 0–199)
FASTING: YES
GLUCOSE BLD-MCNC: 82 MG/DL (ref 74–109)
HDLC SERPL-MCNC: 52 MG/DL (ref 40–90)
LDL CHOLESTEROL CALCULATED: 93 MG/DL
TRIGL SERPL-MCNC: 35 MG/DL (ref 0–199)

## 2018-06-01 ENCOUNTER — EMPLOYEE WELLNESS (OUTPATIENT)
Dept: OTHER | Age: 43
End: 2018-06-01

## 2018-06-11 VITALS — BODY MASS INDEX: 44.55 KG/M2 | WEIGHT: 276 LBS

## 2018-10-04 ENCOUNTER — HOSPITAL ENCOUNTER (OUTPATIENT)
Age: 43
Discharge: HOME | End: 2018-10-04
Payer: COMMERCIAL

## 2018-10-04 LAB
THYROXINE (T4): 7.9 UG/DL (ref 4.5–12)
TSH SERPL DL<=0.05 MIU/L-ACNC: 1.26 UIU/ML (ref 0.4–4.2)

## 2018-10-04 PROCEDURE — 84436 ASSAY OF TOTAL THYROXINE: CPT

## 2018-10-04 PROCEDURE — 84443 ASSAY THYROID STIM HORMONE: CPT

## 2018-10-04 PROCEDURE — 36415 COLL VENOUS BLD VENIPUNCTURE: CPT

## 2018-11-05 RX ORDER — ESCITALOPRAM OXALATE 10 MG/1
TABLET ORAL
Qty: 90 TABLET | Refills: 3 | Status: SHIPPED | OUTPATIENT
Start: 2018-11-05 | End: 2019-05-21 | Stop reason: ALTCHOICE

## 2018-11-05 RX ORDER — LANSOPRAZOLE 30 MG/1
CAPSULE, DELAYED RELEASE ORAL
Qty: 90 CAPSULE | Refills: 3 | Status: SHIPPED | OUTPATIENT
Start: 2018-11-05 | End: 2019-11-30 | Stop reason: SDUPTHER

## 2018-11-05 RX ORDER — LOSARTAN POTASSIUM 100 MG/1
TABLET ORAL
Qty: 90 TABLET | Refills: 3 | Status: SHIPPED | OUTPATIENT
Start: 2018-11-05 | End: 2019-05-21

## 2018-12-13 ENCOUNTER — HOSPITAL ENCOUNTER (OUTPATIENT)
Dept: GENERAL RADIOLOGY | Age: 43
Discharge: HOME | End: 2018-12-13
Payer: COMMERCIAL

## 2018-12-13 ENCOUNTER — TELEPHONE (OUTPATIENT)
Dept: FAMILY MEDICINE CLINIC | Age: 43
End: 2018-12-13

## 2018-12-13 ENCOUNTER — HOSPITAL ENCOUNTER (OUTPATIENT)
Age: 43
Discharge: HOME | End: 2018-12-13
Payer: COMMERCIAL

## 2018-12-13 DIAGNOSIS — M79.671 RIGHT FOOT PAIN: ICD-10-CM

## 2018-12-13 DIAGNOSIS — M79.671 RIGHT FOOT PAIN: Primary | ICD-10-CM

## 2018-12-13 PROCEDURE — 73630 X-RAY EXAM OF FOOT: CPT

## 2018-12-13 PROCEDURE — 73610 X-RAY EXAM OF ANKLE: CPT

## 2019-02-19 ENCOUNTER — OFFICE VISIT (OUTPATIENT)
Dept: FAMILY MEDICINE CLINIC | Age: 44
End: 2019-02-19
Payer: COMMERCIAL

## 2019-02-19 VITALS
WEIGHT: 273 LBS | HEIGHT: 66 IN | BODY MASS INDEX: 43.87 KG/M2 | RESPIRATION RATE: 20 BRPM | HEART RATE: 84 BPM | DIASTOLIC BLOOD PRESSURE: 80 MMHG | SYSTOLIC BLOOD PRESSURE: 134 MMHG | OXYGEN SATURATION: 98 %

## 2019-02-19 DIAGNOSIS — J02.9 SORE THROAT: ICD-10-CM

## 2019-02-19 DIAGNOSIS — J02.0 STREP THROAT: Primary | ICD-10-CM

## 2019-02-19 LAB — STREPTOCOCCUS A RNA: POSITIVE

## 2019-02-19 PROCEDURE — 96372 THER/PROPH/DIAG INJ SC/IM: CPT | Performed by: FAMILY MEDICINE

## 2019-02-19 PROCEDURE — 87651 STREP A DNA AMP PROBE: CPT | Performed by: FAMILY MEDICINE

## 2019-02-19 PROCEDURE — 99213 OFFICE O/P EST LOW 20 MIN: CPT | Performed by: FAMILY MEDICINE

## 2019-02-19 ASSESSMENT — ENCOUNTER SYMPTOMS
CHANGE IN BOWEL HABIT: 0
SINUS PAIN: 1
WHEEZING: 0
EYE DISCHARGE: 0
SORE THROAT: 1
DIARRHEA: 0
CONSTIPATION: 0
SWOLLEN GLANDS: 1
SINUS PRESSURE: 1
VOMITING: 0
ABDOMINAL PAIN: 1
RHINORRHEA: 0
NAUSEA: 1
SHORTNESS OF BREATH: 0
COUGH: 0

## 2019-02-19 ASSESSMENT — PATIENT HEALTH QUESTIONNAIRE - PHQ9
SUM OF ALL RESPONSES TO PHQ QUESTIONS 1-9: 0
2. FEELING DOWN, DEPRESSED OR HOPELESS: 0
1. LITTLE INTEREST OR PLEASURE IN DOING THINGS: 0
SUM OF ALL RESPONSES TO PHQ QUESTIONS 1-9: 0
SUM OF ALL RESPONSES TO PHQ9 QUESTIONS 1 & 2: 0

## 2019-05-02 ENCOUNTER — EMPLOYEE WELLNESS (OUTPATIENT)
Dept: OTHER | Age: 44
End: 2019-05-02

## 2019-05-02 LAB
CHOLESTEROL, TOTAL: 142 MG/DL (ref 0–199)
FASTING: YES
GLUCOSE BLD-MCNC: 78 MG/DL (ref 74–109)
HDLC SERPL-MCNC: 56 MG/DL (ref 40–90)
LDL CHOLESTEROL CALCULATED: 74 MG/DL
TRIGL SERPL-MCNC: 58 MG/DL (ref 0–199)

## 2019-05-06 VITALS — WEIGHT: 270 LBS | BODY MASS INDEX: 43.58 KG/M2

## 2019-05-21 ENCOUNTER — NURSE ONLY (OUTPATIENT)
Dept: LAB | Age: 44
End: 2019-05-21

## 2019-05-21 ENCOUNTER — OFFICE VISIT (OUTPATIENT)
Dept: FAMILY MEDICINE CLINIC | Age: 44
End: 2019-05-21
Payer: COMMERCIAL

## 2019-05-21 VITALS
BODY MASS INDEX: 44.9 KG/M2 | RESPIRATION RATE: 16 BRPM | DIASTOLIC BLOOD PRESSURE: 84 MMHG | HEART RATE: 72 BPM | SYSTOLIC BLOOD PRESSURE: 138 MMHG | WEIGHT: 278.2 LBS | OXYGEN SATURATION: 98 %

## 2019-05-21 DIAGNOSIS — T14.8XXA HEMATOMA: ICD-10-CM

## 2019-05-21 DIAGNOSIS — T14.8XXA HEMATOMA: Primary | ICD-10-CM

## 2019-05-21 LAB
BASOPHILS # BLD: 0.5 %
BASOPHILS ABSOLUTE: 0 THOU/MM3 (ref 0–0.1)
EOSINOPHIL # BLD: 2.7 %
EOSINOPHILS ABSOLUTE: 0.2 THOU/MM3 (ref 0–0.4)
ERYTHROCYTE [DISTWIDTH] IN BLOOD BY AUTOMATED COUNT: 13.7 % (ref 11.5–14.5)
ERYTHROCYTE [DISTWIDTH] IN BLOOD BY AUTOMATED COUNT: 46.6 FL (ref 35–45)
HCT VFR BLD CALC: 39.8 % (ref 37–47)
HEMOGLOBIN: 12.8 GM/DL (ref 12–16)
IMMATURE GRANS (ABS): 0.02 THOU/MM3 (ref 0–0.07)
IMMATURE GRANULOCYTES: 0.3 %
LYMPHOCYTES # BLD: 28.1 %
LYMPHOCYTES ABSOLUTE: 2.1 THOU/MM3 (ref 1–4.8)
MCH RBC QN AUTO: 30.1 PG (ref 26–33)
MCHC RBC AUTO-ENTMCNC: 32.2 GM/DL (ref 32.2–35.5)
MCV RBC AUTO: 93.6 FL (ref 81–99)
MONOCYTES # BLD: 7.1 %
MONOCYTES ABSOLUTE: 0.5 THOU/MM3 (ref 0.4–1.3)
NUCLEATED RED BLOOD CELLS: 0 /100 WBC
PLATELET # BLD: 270 THOU/MM3 (ref 130–400)
PMV BLD AUTO: 10.9 FL (ref 9.4–12.4)
RBC # BLD: 4.25 MILL/MM3 (ref 4.2–5.4)
SEG NEUTROPHILS: 61.3 %
SEGMENTED NEUTROPHILS ABSOLUTE COUNT: 4.7 THOU/MM3 (ref 1.8–7.7)
WBC # BLD: 7.6 THOU/MM3 (ref 4.8–10.8)

## 2019-05-21 PROCEDURE — 99213 OFFICE O/P EST LOW 20 MIN: CPT | Performed by: NURSE PRACTITIONER

## 2019-05-21 ASSESSMENT — ENCOUNTER SYMPTOMS
COUGH: 0
COLOR CHANGE: 0
BACK PAIN: 0

## 2019-05-21 NOTE — PROGRESS NOTES
Mikayla Renee  [address] [place] [address]  Dept: [phone]  Dept Fax: [phone]  Loc: [phone]    Patric Meyer is a 38 y.o. female who presents today for her medical conditions/complaints as noted below. Chief Complaint   Patient presents with    Other     bruising easily-is unsure if she bumped her right forearm but has a large hard bruise on her arm. HPI:     HPI    4 days ago was lifting something and ended up with a big bruise on arm. Stuck out and was very hard. Getting softer now. No pain. No bleeding hx. Past Medical History:   Diagnosis Date    Headache(784.0)     Hypertension     Infertility     Nausea & vomiting     with c birth    YIFAN on CPAP     Polycystic ovaries     PONV (postoperative nausea and vomiting)     PVC (premature ventricular contraction)     seen by Dr. Sukumar Conner, echo done (normal) - decreased caffeine & PVC are less    Thyroid disease       Past Surgical History:   Procedure Laterality Date    APPENDECTOMY  2004     SECTION      CHOLECYSTECTOMY      CYST REMOVAL      ovarian    DILATION AND CURETTAGE OF UTERUS  2014    HYSTEROSCOPY  6/3/14    Dilation and Curettage    KNEE ARTHROSCOPY      OTHER SURGICAL HISTORY Right 2015    Right Knee Arthroscopy    SLEEVE GASTRECTOMY  10/10/2016    robotic    STOMACH SURGERY      gastric sleeve    THYROIDECTOMY Bilateral 2016    TOOTH EXTRACTION Right 13    right lower       Family History   Problem Relation Age of Onset    Hypertension Mother    Nova Lighter Arthritis Mother     Other Mother         at fib    Obesity Father     Lung Cancer Father     Obesity Brother     Heart Attack Paternal Grandfather     Cancer Brother        Social History     Tobacco Use    Smoking status: Never Smoker    Smokeless tobacco: Never Used   Substance Use Topics    Alcohol use:  Yes     Alcohol/week: 0.0 oz     Comment: social      Current Outpatient Medications   Medication Sig Dispense Refill    lansoprazole (PREVACID) 30 MG delayed release capsule TAKE ONE CAPSULE BY MOUTH ONE TIME A DAY 90 capsule 3    ondansetron (ZOFRAN ODT) 4 MG disintegrating tablet Take 1 tablet by mouth every 8 hours as needed for Nausea or Vomiting 20 tablet 0    vitamin B-12 (CYANOCOBALAMIN) 1000 MCG tablet Take 1,000 mcg by mouth daily      Biotin 1000 MCG TABS Take 10,000 mcg by mouth daily      Cholecalciferol (VITAMIN D3) 32822 UNITS CAPS Take 1 capsule by mouth once a week (Patient taking differently: Take 1 capsule by mouth daily ) 4 capsule 1    calcium carbonate (OSCAL) 500 MG TABS tablet Take 500 mg by mouth 3 times daily      levothyroxine (SYNTHROID) 200 MCG tablet Take 225 mcg by mouth Daily      multivitamin (THERAGRAN) per tablet Take 1 tablet by mouth daily. No current facility-administered medications for this visit. No Known Allergies    Health Maintenance   Topic Date Due    HIV screen  02/03/1991    DTaP/Tdap/Td vaccine (1 - Tdap) 02/03/1995    Cervical cancer screen  02/03/1997    Potassium monitoring  03/10/2019    Creatinine monitoring  03/10/2019    TSH testing  10/04/2019    Lipid screen  05/02/2024    Flu vaccine  Completed    Pneumococcal 0-64 years Vaccine  Aged Out       Subjective:      Review of Systems   Constitutional: Negative for chills, fatigue and fever. Respiratory: Negative for cough. Cardiovascular: Negative for chest pain. Musculoskeletal: Negative for arthralgias, back pain, gait problem, joint swelling, myalgias and neck pain. Skin: Negative for color change, pallor and rash. Bruise on arm   Neurological: Negative for dizziness, light-headedness and headaches. Objective:     Physical Exam   Constitutional: She is oriented to person, place, and time. She appears well-developed and well-nourished. HENT:   Head: Normocephalic.    Neck: Normal range of motion. Cardiovascular: Normal rate. Pulmonary/Chest: Effort normal.   Musculoskeletal: Normal range of motion. She exhibits no tenderness. Neurological: She is alert and oriented to person, place, and time. Skin: Skin is warm and dry. No rash noted. /84 (Site: Left Upper Arm, Position: Sitting, Cuff Size: Medium Adult)   Pulse 72   Resp 16   Wt 278 lb 3.2 oz (126.2 kg)   SpO2 98%   BMI 44.90 kg/m²     Assessment:       Diagnosis Orders   1. Hematoma  CBC Auto Differential       Plan:     Monitor  Tylenol for discomfort  Obtain cbc    Discussed use, benefit, and side effects of prescribed medications. Barriers tomedication compliance addressed. All patient questions answered. Pt voiced understanding. Return if symptoms worsen or fail to improve. Orders Placed This Encounter   Procedures    CBC Auto Differential     Standing Status:   Future     Standing Expiration Date:   5/20/2020     No orders of the defined types were placed in this encounter. Reccommended tobacco cessation options including pharmacologicmethods, counseled great than 3 minutes during this visit:  Yes  []  No  []     Patient given educational materials - see patient instructions. Discussed use, benefit, and sideeffects of prescribed medications. All patient questions answered. Pt voiced understanding. Reviewed health maintenance. Instructed to continue current medications, diet and exercise. Patient agreed with treatment plan. Follow up as directed.      Electronically signed by NICO Coughlin CNP on 5/21/2019 at 2:48 PM

## 2019-08-30 ENCOUNTER — HOSPITAL ENCOUNTER (OUTPATIENT)
Dept: MAMMOGRAPHY | Age: 44
Discharge: HOME | End: 2019-08-30
Payer: COMMERCIAL

## 2019-08-30 DIAGNOSIS — Z12.39 SCREENING BREAST EXAMINATION: ICD-10-CM

## 2019-08-30 PROCEDURE — 77063 BREAST TOMOSYNTHESIS BI: CPT

## 2019-09-09 ENCOUNTER — HOSPITAL ENCOUNTER (OUTPATIENT)
Dept: WOMENS IMAGING | Age: 44
Discharge: HOME | End: 2019-09-09
Payer: COMMERCIAL

## 2019-09-09 DIAGNOSIS — R92.2 BREAST DENSITY: ICD-10-CM

## 2019-09-09 PROCEDURE — 76642 ULTRASOUND BREAST LIMITED: CPT

## 2019-09-09 PROCEDURE — G0279 TOMOSYNTHESIS, MAMMO: HCPCS

## 2019-10-17 ENCOUNTER — TELEPHONE (OUTPATIENT)
Dept: FAMILY MEDICINE CLINIC | Age: 44
End: 2019-10-17

## 2019-10-17 ENCOUNTER — OFFICE VISIT (OUTPATIENT)
Dept: FAMILY MEDICINE CLINIC | Age: 44
End: 2019-10-17
Payer: COMMERCIAL

## 2019-10-17 VITALS
WEIGHT: 282 LBS | BODY MASS INDEX: 45.52 KG/M2 | OXYGEN SATURATION: 98 % | DIASTOLIC BLOOD PRESSURE: 76 MMHG | RESPIRATION RATE: 16 BRPM | HEART RATE: 76 BPM | SYSTOLIC BLOOD PRESSURE: 138 MMHG

## 2019-10-17 DIAGNOSIS — T14.8XXA BRUISING: Primary | ICD-10-CM

## 2019-10-17 DIAGNOSIS — E03.9 ACQUIRED HYPOTHYROIDISM: ICD-10-CM

## 2019-10-17 DIAGNOSIS — F43.21 GRIEF: ICD-10-CM

## 2019-10-17 DIAGNOSIS — Z98.84 S/P LAPAROSCOPIC SLEEVE GASTRECTOMY: ICD-10-CM

## 2019-10-17 PROCEDURE — 99214 OFFICE O/P EST MOD 30 MIN: CPT | Performed by: FAMILY MEDICINE

## 2019-10-17 RX ORDER — ESCITALOPRAM OXALATE 10 MG/1
10 TABLET ORAL DAILY
Qty: 90 TABLET | Refills: 2 | Status: SHIPPED | OUTPATIENT
Start: 2019-10-17 | End: 2020-04-14 | Stop reason: SDUPTHER

## 2019-10-17 RX ORDER — OMEPRAZOLE 20 MG/1
20 CAPSULE, DELAYED RELEASE ORAL DAILY
COMMUNITY
End: 2020-01-16

## 2019-10-17 ASSESSMENT — ENCOUNTER SYMPTOMS
DIARRHEA: 0
CONSTIPATION: 0
ABDOMINAL PAIN: 0
SHORTNESS OF BREATH: 0
WHEEZING: 0
SORE THROAT: 0
NAUSEA: 0
COUGH: 0
RHINORRHEA: 0

## 2019-10-18 ENCOUNTER — NURSE ONLY (OUTPATIENT)
Dept: LAB | Age: 44
End: 2019-10-18

## 2019-10-18 DIAGNOSIS — Z98.84 S/P LAPAROSCOPIC SLEEVE GASTRECTOMY: ICD-10-CM

## 2019-10-18 DIAGNOSIS — T14.8XXA BRUISING: ICD-10-CM

## 2019-10-18 LAB
ALBUMIN SERPL-MCNC: 4.3 G/DL (ref 3.5–5.1)
ALP BLD-CCNC: 69 U/L (ref 38–126)
ALT SERPL-CCNC: 13 U/L (ref 11–66)
ANION GAP SERPL CALCULATED.3IONS-SCNC: 13 MEQ/L (ref 8–16)
AST SERPL-CCNC: 19 U/L (ref 5–40)
BASOPHILS # BLD: 0.5 %
BASOPHILS ABSOLUTE: 0 THOU/MM3 (ref 0–0.1)
BILIRUB SERPL-MCNC: 0.5 MG/DL (ref 0.3–1.2)
BUN BLDV-MCNC: 15 MG/DL (ref 7–22)
CALCIUM SERPL-MCNC: 9.4 MG/DL (ref 8.5–10.5)
CHLORIDE BLD-SCNC: 95 MEQ/L (ref 98–111)
CO2: 26 MEQ/L (ref 23–33)
CREAT SERPL-MCNC: 0.6 MG/DL (ref 0.4–1.2)
EOSINOPHIL # BLD: 3.3 %
EOSINOPHILS ABSOLUTE: 0.3 THOU/MM3 (ref 0–0.4)
ERYTHROCYTE [DISTWIDTH] IN BLOOD BY AUTOMATED COUNT: 13.2 % (ref 11.5–14.5)
ERYTHROCYTE [DISTWIDTH] IN BLOOD BY AUTOMATED COUNT: 45.7 FL (ref 35–45)
FERRITIN: 71 NG/ML (ref 10–291)
FOLATE: 7.7 NG/ML (ref 4.8–24.2)
GFR SERPL CREATININE-BSD FRML MDRD: > 90 ML/MIN/1.73M2
GLUCOSE BLD-MCNC: 82 MG/DL (ref 70–108)
HCT VFR BLD CALC: 44 % (ref 37–47)
HEMOGLOBIN: 14.3 GM/DL (ref 12–16)
IMMATURE GRANS (ABS): 0.02 THOU/MM3 (ref 0–0.07)
IMMATURE GRANULOCYTES: 0.3 %
INR BLD: 1.03 (ref 0.85–1.13)
LYMPHOCYTES # BLD: 27.2 %
LYMPHOCYTES ABSOLUTE: 2.1 THOU/MM3 (ref 1–4.8)
MCH RBC QN AUTO: 30.6 PG (ref 26–33)
MCHC RBC AUTO-ENTMCNC: 32.5 GM/DL (ref 32.2–35.5)
MCV RBC AUTO: 94.2 FL (ref 81–99)
MONOCYTES # BLD: 5.8 %
MONOCYTES ABSOLUTE: 0.4 THOU/MM3 (ref 0.4–1.3)
NUCLEATED RED BLOOD CELLS: 0 /100 WBC
PLATELET # BLD: 298 THOU/MM3 (ref 130–400)
PMV BLD AUTO: 10 FL (ref 9.4–12.4)
POTASSIUM SERPL-SCNC: 3.8 MEQ/L (ref 3.5–5.2)
RBC # BLD: 4.67 MILL/MM3 (ref 4.2–5.4)
SEG NEUTROPHILS: 62.9 %
SEGMENTED NEUTROPHILS ABSOLUTE COUNT: 4.8 THOU/MM3 (ref 1.8–7.7)
SODIUM BLD-SCNC: 134 MEQ/L (ref 135–145)
TOTAL PROTEIN: 6.7 G/DL (ref 6.1–8)
TSH SERPL DL<=0.05 MIU/L-ACNC: 4.48 UIU/ML (ref 0.4–4.2)
VITAMIN B-12: 595 PG/ML (ref 211–911)
WBC # BLD: 7.7 THOU/MM3 (ref 4.8–10.8)

## 2019-10-19 LAB — THYROXINE (T4): 9.1 UG/DL (ref 4.5–12)

## 2019-10-22 LAB — VITAMIN B6: 233.6 NMOL/L (ref 20–125)

## 2019-11-02 ENCOUNTER — HOSPITAL ENCOUNTER (EMERGENCY)
Age: 44
Discharge: HOME | End: 2019-11-02
Attending: EMERGENCY MEDICINE
Payer: COMMERCIAL

## 2019-11-02 VITALS
HEART RATE: 98 BPM | SYSTOLIC BLOOD PRESSURE: 162 MMHG | BODY MASS INDEX: 38.51 KG/M2 | TEMPERATURE: 97.4 F | HEIGHT: 69 IN | DIASTOLIC BLOOD PRESSURE: 86 MMHG | OXYGEN SATURATION: 98 % | WEIGHT: 260 LBS | RESPIRATION RATE: 15 BRPM

## 2019-11-02 DIAGNOSIS — K29.20 ACUTE ALCOHOLIC GASTRITIS WITHOUT HEMORRHAGE: ICD-10-CM

## 2019-11-02 DIAGNOSIS — R11.2 INTRACTABLE VOMITING WITH NAUSEA, UNSPECIFIED VOMITING TYPE: Primary | ICD-10-CM

## 2019-11-02 DIAGNOSIS — I10 ESSENTIAL HYPERTENSION: ICD-10-CM

## 2019-11-02 LAB
ALBUMIN SERPL-MCNC: 4.3 GM/DL (ref 3.4–5)
ALP BLD-CCNC: 96 U/L (ref 46–116)
ALT SERPL-CCNC: 19 U/L (ref 14–63)
ANION GAP: 14 MEQ/L (ref 8–16)
AST SERPL-CCNC: 23 U/L (ref 15–37)
BASOPHILS # BLD: 0.5 % (ref 0–3)
BILIRUB SERPL-MCNC: 0.7 MG/DL (ref 0.2–1)
BUN BLDV-MCNC: 12 MG/DL (ref 7–18)
CHLORIDE BLD-SCNC: 102 MEQ/L (ref 98–107)
CO2: 25 MEQ/L (ref 21–32)
CREAT SERPL-MCNC: 0.6 MG/DL (ref 0.6–1.3)
EOSINOPHILS RELATIVE PERCENT: 0.9 % (ref 0–4)
GFR, ESTIMATED: > 90 ML/MIN/1.73M2
GLUCOSE BLD-MCNC: 108 MG/DL (ref 74–106)
HCT VFR BLD CALC: 47.1 % (ref 37–47)
HEMOGLOBIN: 15.3 GM/DL (ref 12–16)
LIPASE: 95 U/L (ref 73–393)
LYMPHOCYTES # BLD: 5.3 % (ref 15–47)
MCH RBC QN AUTO: 30.4 PG (ref 27–31)
MCHC RBC AUTO-ENTMCNC: 32.6 GM/DL (ref 33–37)
MCV RBC AUTO: 93.5 FL (ref 81–99)
MONOCYTES: 2.5 % (ref 0–12)
PDW BLD-RTO: 13.3 % (ref 11.5–14.5)
PLATELET # BLD: 245 THOU/MM3 (ref 130–400)
PLATELET ESTIMATE: ABNORMAL
PMV BLD AUTO: 7.9 FL (ref 7.4–10.4)
POC CALCIUM: 9.1 MG/DL (ref 8.5–10.1)
POTASSIUM SERPL-SCNC: 4 MEQ/L (ref 3.5–5.1)
RBC # BLD: 5.04 MILL/MM3 (ref 4.2–5.4)
SCAN OF BLOOD SMEAR: NORMAL
SEGS: 90.8 % (ref 43–75)
SODIUM BLD-SCNC: 141 MEQ/L (ref 136–145)
TOTAL PROTEIN: 8 GM/DL (ref 6.4–8.2)
WBC # BLD: 16 THOU/MM3 (ref 4.8–10.8)

## 2019-11-02 PROCEDURE — 83690 ASSAY OF LIPASE: CPT

## 2019-11-02 PROCEDURE — 80053 COMPREHEN METABOLIC PANEL: CPT

## 2019-11-02 PROCEDURE — 96374 THER/PROPH/DIAG INJ IV PUSH: CPT

## 2019-11-02 PROCEDURE — 85025 COMPLETE CBC W/AUTO DIFF WBC: CPT

## 2019-11-02 PROCEDURE — 99283 EMERGENCY DEPT VISIT LOW MDM: CPT

## 2019-11-02 PROCEDURE — 96372 THER/PROPH/DIAG INJ SC/IM: CPT

## 2019-11-02 PROCEDURE — 2580000003 HC RX 258: Performed by: EMERGENCY MEDICINE

## 2019-11-02 PROCEDURE — 6360000002 HC RX W HCPCS: Performed by: EMERGENCY MEDICINE

## 2019-11-02 PROCEDURE — 2709999900 HC NON-CHARGEABLE SUPPLY

## 2019-11-02 PROCEDURE — 36415 COLL VENOUS BLD VENIPUNCTURE: CPT

## 2019-11-02 PROCEDURE — 96375 TX/PRO/DX INJ NEW DRUG ADDON: CPT

## 2019-11-02 PROCEDURE — C9113 INJ PANTOPRAZOLE SODIUM, VIA: HCPCS | Performed by: EMERGENCY MEDICINE

## 2019-11-02 RX ORDER — 0.9 % SODIUM CHLORIDE 0.9 %
2000 INTRAVENOUS SOLUTION INTRAVENOUS ONCE
Status: COMPLETED | OUTPATIENT
Start: 2019-11-02 | End: 2019-11-02

## 2019-11-02 RX ORDER — ONDANSETRON 2 MG/ML
4 INJECTION INTRAMUSCULAR; INTRAVENOUS ONCE
Status: COMPLETED | OUTPATIENT
Start: 2019-11-02 | End: 2019-11-02

## 2019-11-02 RX ORDER — PANTOPRAZOLE SODIUM 40 MG/10ML
40 INJECTION, POWDER, LYOPHILIZED, FOR SOLUTION INTRAVENOUS ONCE
Status: COMPLETED | OUTPATIENT
Start: 2019-11-02 | End: 2019-11-02

## 2019-11-02 RX ORDER — PROMETHAZINE HYDROCHLORIDE 25 MG/1
25 TABLET ORAL EVERY 6 HOURS PRN
Qty: 20 TABLET | Refills: 0 | Status: SHIPPED | OUTPATIENT
Start: 2019-11-02 | End: 2019-11-09

## 2019-11-02 RX ORDER — DIPHENHYDRAMINE HYDROCHLORIDE 50 MG/ML
25 INJECTION INTRAMUSCULAR; INTRAVENOUS ONCE
Status: COMPLETED | OUTPATIENT
Start: 2019-11-02 | End: 2019-11-02

## 2019-11-02 RX ORDER — KETOROLAC TROMETHAMINE 30 MG/ML
30 INJECTION, SOLUTION INTRAMUSCULAR; INTRAVENOUS ONCE
Status: COMPLETED | OUTPATIENT
Start: 2019-11-02 | End: 2019-11-02

## 2019-11-02 RX ORDER — METOCLOPRAMIDE HYDROCHLORIDE 5 MG/ML
10 INJECTION INTRAMUSCULAR; INTRAVENOUS ONCE
Status: COMPLETED | OUTPATIENT
Start: 2019-11-02 | End: 2019-11-02

## 2019-11-02 RX ORDER — ONDANSETRON HYDROCHLORIDE 8 MG/1
8 TABLET, FILM COATED ORAL EVERY 8 HOURS PRN
COMMUNITY
End: 2020-10-03

## 2019-11-02 RX ORDER — PROMETHAZINE HYDROCHLORIDE 25 MG/ML
25 INJECTION, SOLUTION INTRAMUSCULAR; INTRAVENOUS ONCE
Status: COMPLETED | OUTPATIENT
Start: 2019-11-02 | End: 2019-11-02

## 2019-11-02 RX ORDER — SODIUM CHLORIDE 9 MG/ML
10 INJECTION INTRAVENOUS DAILY
Status: DISCONTINUED | OUTPATIENT
Start: 2019-11-02 | End: 2019-11-02 | Stop reason: HOSPADM

## 2019-11-02 RX ORDER — MEPERIDINE HYDROCHLORIDE 25 MG/ML
50 INJECTION INTRAMUSCULAR; INTRAVENOUS; SUBCUTANEOUS ONCE
Status: COMPLETED | OUTPATIENT
Start: 2019-11-02 | End: 2019-11-02

## 2019-11-02 RX ADMIN — PROMETHAZINE HYDROCHLORIDE 25 MG: 25 INJECTION INTRAMUSCULAR; INTRAVENOUS at 17:20

## 2019-11-02 RX ADMIN — METOCLOPRAMIDE 10 MG: 5 INJECTION, SOLUTION INTRAMUSCULAR; INTRAVENOUS at 16:04

## 2019-11-02 RX ADMIN — DIPHENHYDRAMINE HYDROCHLORIDE 25 MG: 50 INJECTION, SOLUTION INTRAMUSCULAR; INTRAVENOUS at 15:32

## 2019-11-02 RX ADMIN — PANTOPRAZOLE SODIUM 40 MG: 40 INJECTION, POWDER, FOR SOLUTION INTRAVENOUS at 14:46

## 2019-11-02 RX ADMIN — Medication 10 ML: at 14:46

## 2019-11-02 RX ADMIN — SODIUM CHLORIDE 2000 ML: 9 INJECTION, SOLUTION INTRAVENOUS at 14:47

## 2019-11-02 RX ADMIN — ONDANSETRON 4 MG: 2 INJECTION INTRAMUSCULAR; INTRAVENOUS at 14:46

## 2019-11-02 RX ADMIN — KETOROLAC TROMETHAMINE 30 MG: 30 INJECTION, SOLUTION INTRAMUSCULAR at 15:33

## 2019-11-02 RX ADMIN — MEPERIDINE HYDROCHLORIDE 50 MG: 25 INJECTION INTRAMUSCULAR; INTRAVENOUS; SUBCUTANEOUS at 15:32

## 2019-11-02 ASSESSMENT — ENCOUNTER SYMPTOMS
WHEEZING: 0
VOMITING: 1
EYE DISCHARGE: 0
DIARRHEA: 0
ABDOMINAL PAIN: 1
BLOOD IN STOOL: 0
EYE PAIN: 0
NAUSEA: 1
SORE THROAT: 0
SHORTNESS OF BREATH: 0

## 2019-11-02 ASSESSMENT — PAIN SCALES - GENERAL
PAINLEVEL_OUTOF10: 3
PAINLEVEL_OUTOF10: 6
PAINLEVEL_OUTOF10: 4
PAINLEVEL_OUTOF10: 6

## 2019-11-02 ASSESSMENT — PAIN DESCRIPTION - LOCATION
LOCATION: HEAD

## 2019-12-02 RX ORDER — LANSOPRAZOLE 30 MG/1
CAPSULE, DELAYED RELEASE ORAL
Qty: 90 CAPSULE | Refills: 3 | Status: SHIPPED | OUTPATIENT
Start: 2019-12-02 | End: 2022-03-09 | Stop reason: SDUPTHER

## 2020-01-16 ENCOUNTER — OFFICE VISIT (OUTPATIENT)
Dept: BARIATRICS/WEIGHT MGMT | Age: 45
End: 2020-01-16

## 2020-01-16 ENCOUNTER — OFFICE VISIT (OUTPATIENT)
Dept: BARIATRICS/WEIGHT MGMT | Age: 45
End: 2020-01-16
Payer: COMMERCIAL

## 2020-01-16 VITALS
BODY MASS INDEX: 45.99 KG/M2 | DIASTOLIC BLOOD PRESSURE: 92 MMHG | SYSTOLIC BLOOD PRESSURE: 146 MMHG | WEIGHT: 293 LBS | HEIGHT: 67 IN | TEMPERATURE: 98.8 F | HEART RATE: 88 BPM

## 2020-01-16 PROCEDURE — 99214 OFFICE O/P EST MOD 30 MIN: CPT | Performed by: PHYSICIAN ASSISTANT

## 2020-01-16 NOTE — PATIENT INSTRUCTIONS
Goals: 1. Protein goal is 60-80 grams protein per day. Remember to choose protein foods first at meals to meet this goal, followed by vegetables, then fruit, and starch food last if still hungry. 2.  Water goal is 64 oz per day. Separate liquids from solids. Eliminate pop after lunch at work. Get rid of pop at house! 3. Take 20-30 minutes to eat - this helps with mindful eating as well. Do not skip meals and stick to consistent meal times schedule as much as possible. 4.  Physical activity remains important to maintain weight loss efforts  5. Routine vitamin intake is important to avoid deficiencies. Start Celebrate [facility] R 45\" take one capsule a day with food. Take Equate Calcium Citrate plus D3 take two tablets a day.

## 2020-01-16 NOTE — PROGRESS NOTES
file     Minutes per session: Not on file    Stress: Not on file   Relationships    Social connections:     Talks on phone: Not on file     Gets together: Not on file     Attends Zoroastrianism service: Not on file     Active member of club or organization: Not on file     Attends meetings of clubs or organizations: Not on file     Relationship status: Not on file    Intimate partner violence:     Fear of current or ex partner: Not on file     Emotionally abused: Not on file     Physically abused: Not on file     Forced sexual activity: Not on file   Other Topics Concern    Not on file   Social History Narrative    Not on file        Medications:   Current Outpatient Medications   Medication Sig Dispense Refill    lansoprazole (PREVACID) 30 MG delayed release capsule TAKE 1 CAPSULE BY MOUTH ONE TIME A DAY 90 capsule 3    escitalopram (LEXAPRO) 10 MG tablet Take 1 tablet by mouth daily 90 tablet 2    vitamin B-12 (CYANOCOBALAMIN) 1000 MCG tablet Take 1,000 mcg by mouth daily      Biotin 1000 MCG TABS Take 10,000 mcg by mouth daily      Cholecalciferol (VITAMIN D3) 68309 UNITS CAPS Take 1 capsule by mouth once a week (Patient taking differently: Take 1 capsule by mouth daily ) 4 capsule 1    calcium carbonate (OSCAL) 500 MG TABS tablet Take 500 mg by mouth 3 times daily      levothyroxine (SYNTHROID) 200 MCG tablet Take 225 mcg by mouth Daily      multivitamin (THERAGRAN) per tablet Take 1 tablet by mouth daily.  ondansetron (ZOFRAN) 8 MG tablet Take 8 mg by mouth every 8 hours as needed for Nausea or Vomiting      ondansetron (ZOFRAN ODT) 4 MG disintegrating tablet Take 1 tablet by mouth every 8 hours as needed for Nausea or Vomiting (Patient not taking: Reported on 10/17/2019) 20 tablet 0     No current facility-administered medications for this visit. Allergies:   No Known Allergies    Constitutional: Denies any fever, chills,(+) fatigue.   Wound: Denies any rash, skin color changes or wound problems. Resp: Denies any cough, shortness of breath. CV: Denies any chest pain, orthopnea or syncope. MS: Denies myalgias, (+)arthralgias. GI: Denies any nausea, vomiting, diarrhea, constipation, melena, hematochezia. No incisional discomfort. : Denies any hematuria, hesitancy or dysuria. NEURO: Denies seizures, headache. Objective:      BP (!) 146/92 (Site: Right Upper Arm, Position: Sitting, Cuff Size: Large Adult)   Pulse 88   Temp 98.8 °F (37.1 °C) (Oral)   Ht 5' 6.5\" (1.689 m)   Wt (!) 307 lb (139.3 kg)   BMI 48.81 kg/m²   Physical Examination:   Constitutional: Alert and oriented to person, place and time. Well-developed, well- nourished. Head: Normocephalic and atraumatic  Neck: Supple. Eyes: EOMI b/l. Conjunctivae normal.  No scleral icterus. Respiratory: Effort normal. No respiratory distress. Abd: Benign  Ext:  Movement x 4. No edema  Skin; Warm and dry, no visible rashes, lesions or ulcers.    Neuro: Cranial Nerves Grossly Intact; nml coordination          Labs:  CBC   Lab Results   Component Value Date    WBC 16.0 11/02/2019    RBC 5.04 11/02/2019    RBC 3.31 06/01/2012    HGB 15.3 11/02/2019    HCT 47.1 11/02/2019    MCV 93.5 11/02/2019    MCH 30.4 11/02/2019    MCHC 32.6 11/02/2019    RDW 13.3 11/02/2019     11/02/2019    MPV 7.9 11/02/2019    RBCMORP NORMAL 10/05/2017    SEGSPCT 62.9 10/18/2019    LABLYMP 5.3 11/02/2019    MONOPCT 5.8 10/18/2019    LABEOS 3.3 10/18/2019    BASO 0.5 11/02/2019    NRBC 0 10/18/2019    ANISOCYTOSIS 1+ 01/10/2017    SEGSABS 4.8 10/18/2019    LYMPHSABS 2.1 10/18/2019    MONOSABS 0.4 10/18/2019    EOSABS 0.3 10/18/2019    BASOSABS 0.0 10/18/2019        BMP/CMP   Lab Results   Component Value Date    GLUCOSE 108 11/02/2019    GLUCOSE 87 05/12/2012    CREATININE 0.6 11/02/2019    BUN 12 11/02/2019     11/02/2019    K 4.0 11/02/2019    K 4.2 03/10/2018     11/02/2019    CO2 25 11/02/2019    CALCIUM 9.4 10/18/2019    AST 23 11/02/2019    ALKPHOS 96 11/02/2019    PROT 8.0 11/02/2019    LABALBU 4.3 11/02/2019    LABALBU 3.8 05/12/2012    BILITOT 0.7 11/02/2019    BILITOT NEGATIVE 08/23/2011    ALT 19 11/02/2019        PREALBUMIN   Lab Results   Component Value Date    PREALBUMIN 17.8 10/05/2017        VITAMIN B12   Lab Results   Component Value Date    HIGRHEJW76 595 10/18/2019        24 HOUR URINE CALCIUM   Lab Results   Component Value Date    URVOLMEAS 1530 04/06/2017    HOURSC 24.0 04/06/2017    CALCIUMUR 11.4 04/06/2017    CALCIUMUR 174 04/06/2017        VITAMIN D   Lab Results   Component Value Date    VITD25 38 10/05/2017        RBC FOLATE   Lab Results   Component Value Date    FOLATE 7.7 10/18/2019        LIPID SCREEN (FASTING)   Lab Results   Component Value Date    CHOL 142 05/02/2019    TRIG 58 05/02/2019    HDL 56 05/02/2019    LDLCALC 74 05/02/2019     HGA1C (T2DM ONLY)   Lab Results   Component Value Date    LABA1C 4.9 10/05/2017    AVGG 87 10/05/2017     TSH   Lab Results   Component Value Date    TSH 4.480 10/18/2019        IRON   Lab Results   Component Value Date    IRON 102 10/05/2017         Assessment:       Diagnosis Orders   1. S/P laparoscopic sleeve gastrectomy  Vitamin B1    TSH with Reflex    Vitamin D 25 Hydroxy   2. Morbid obesity with BMI of 45.0-49.9, adult (HCC)  Vitamin B1    TSH with Reflex    Vitamin D 25 Hydroxy   3. Gastroesophageal reflux disease, esophagitis presence not specified     4. Depression, unspecified depression type     5. Hypothyroidism, unspecified type     6. Postsurgical malabsorption  Vitamin B1    TSH with Reflex    Vitamin D 25 Hydroxy   7. Screening for malnutrition  Vitamin B1    TSH with Reflex    Vitamin D 25 Hydroxy     Plan:    Stay well hydrated. Drink a minimum of 64 oz of non-carbonated, non-caffeinated fluids daily. Nutritional education occurred during visit. Tolerating diet. Continue following with dietitian and follow their recommendations as directed.  Continue 60-80 grams of protein each day. Signs and symptoms reviewed with patient that would be concerning and need her to return to office for re-evaluation. Patient will call if any questions or concerns arrise. Importance of physical activity discussed with patient. Increase physical activity as tolerated  Increase strength training, as tolerated  Take bariatric MV daily- information given  Encouraged to attend support groups  Vit D/ B1/TSH ordered as have not been completed recently. Last TSH>4. If repeat > 4 will follow up with endocrinology. Continue following thyroid with Endocrinology. Kitchen clean out- get rid of pop  Avoid alcohol  Continue counseling  Start Rev-It-Up next week  Encouraged to attend post-op support groups  Will follow up in 3 months after Rev-It-Up completed. Return in about 6 months (around 7/16/2020) for postop follow up. I spent over 40 minutes with the patient, with greater that 50% of that time spent on face counseling for nutrition and exercise.     Electronically signed by VINH Rivera on 1/16/2020 at 3:46 PM

## 2020-01-29 ENCOUNTER — OFFICE VISIT (OUTPATIENT)
Dept: BARIATRICS/WEIGHT MGMT | Age: 45
End: 2020-01-29

## 2020-01-29 NOTE — PROGRESS NOTES
Patient attended week one of Rev It Up Class. Body measurements and SECA Scale analysis was obtained. Results were scanned into media section. Weekly content was reviewed in class setting. The following challenges were set in class:  1. Foundation Challenge:  Recognizing Hunger and Fullness Cues  2. Food Challenge:  Eat Breakfast within 1- 1.5 hours of getting out of bed  3. Fluid Challenge:   Double amount of water currently drinking. Goal is 64 oz a day  4.   Fitness Challenge:  Find an aerobic activity you enjoy and commit to body movement

## 2020-02-05 ENCOUNTER — OFFICE VISIT (OUTPATIENT)
Dept: BARIATRICS/WEIGHT MGMT | Age: 45
End: 2020-02-05

## 2020-04-09 ENCOUNTER — VIRTUAL VISIT (OUTPATIENT)
Dept: FAMILY MEDICINE CLINIC | Age: 45
End: 2020-04-09
Payer: COMMERCIAL

## 2020-04-09 PROCEDURE — 99214 OFFICE O/P EST MOD 30 MIN: CPT | Performed by: FAMILY MEDICINE

## 2020-04-09 RX ORDER — LORAZEPAM 0.5 MG/1
0.5 TABLET ORAL DAILY PRN
Qty: 30 TABLET | Refills: 1 | Status: SHIPPED | OUTPATIENT
Start: 2020-04-09 | End: 2021-08-23 | Stop reason: SDUPTHER

## 2020-04-09 ASSESSMENT — ENCOUNTER SYMPTOMS
COLOR CHANGE: 1
NAUSEA: 0
CONSTIPATION: 0
DIARRHEA: 0
WHEEZING: 0
SORE THROAT: 0
SHORTNESS OF BREATH: 0
RHINORRHEA: 0
ABDOMINAL PAIN: 0
EYE DISCHARGE: 0
COUGH: 0

## 2020-04-14 ENCOUNTER — OFFICE VISIT (OUTPATIENT)
Dept: PRIMARY CARE CLINIC | Age: 45
End: 2020-04-14
Payer: COMMERCIAL

## 2020-04-14 VITALS
DIASTOLIC BLOOD PRESSURE: 84 MMHG | SYSTOLIC BLOOD PRESSURE: 132 MMHG | OXYGEN SATURATION: 96 % | RESPIRATION RATE: 16 BRPM | HEART RATE: 63 BPM

## 2020-04-14 PROCEDURE — 96372 THER/PROPH/DIAG INJ SC/IM: CPT | Performed by: FAMILY MEDICINE

## 2020-04-14 PROCEDURE — 99213 OFFICE O/P EST LOW 20 MIN: CPT | Performed by: FAMILY MEDICINE

## 2020-04-14 RX ORDER — TRIAMCINOLONE ACETONIDE 40 MG/ML
40 INJECTION, SUSPENSION INTRA-ARTICULAR; INTRAMUSCULAR ONCE
Status: COMPLETED | OUTPATIENT
Start: 2020-04-14 | End: 2020-04-14

## 2020-04-14 RX ORDER — ESCITALOPRAM OXALATE 20 MG/1
20 TABLET ORAL DAILY
Qty: 90 TABLET | Refills: 3
Start: 2020-04-14 | End: 2021-03-03 | Stop reason: SDUPTHER

## 2020-04-14 RX ORDER — FEXOFENADINE HCL 180 MG/1
180 TABLET ORAL DAILY
Qty: 30 TABLET | Refills: 11
Start: 2020-04-14 | End: 2020-05-14

## 2020-04-14 RX ADMIN — TRIAMCINOLONE ACETONIDE 40 MG: 40 INJECTION, SUSPENSION INTRA-ARTICULAR; INTRAMUSCULAR at 11:25

## 2020-05-19 ENCOUNTER — OFFICE VISIT (OUTPATIENT)
Dept: FAMILY MEDICINE CLINIC | Age: 45
End: 2020-05-19
Payer: COMMERCIAL

## 2020-05-19 VITALS
TEMPERATURE: 98.1 F | HEART RATE: 64 BPM | OXYGEN SATURATION: 97 % | DIASTOLIC BLOOD PRESSURE: 102 MMHG | RESPIRATION RATE: 18 BRPM | SYSTOLIC BLOOD PRESSURE: 146 MMHG

## 2020-05-19 LAB
BASOPHILS # BLD: 0.5 %
BASOPHILS ABSOLUTE: 0.1 THOU/MM3 (ref 0–0.1)
EOSINOPHIL # BLD: 2.6 %
EOSINOPHILS ABSOLUTE: 0.3 THOU/MM3 (ref 0–0.4)
ERYTHROCYTE [DISTWIDTH] IN BLOOD BY AUTOMATED COUNT: 13.3 % (ref 11.5–14.5)
ERYTHROCYTE [DISTWIDTH] IN BLOOD BY AUTOMATED COUNT: 45.2 FL (ref 35–45)
HCT VFR BLD CALC: 42.8 % (ref 37–47)
HEMOGLOBIN: 14 GM/DL (ref 12–16)
IMMATURE GRANS (ABS): 0.02 THOU/MM3 (ref 0–0.07)
IMMATURE GRANULOCYTES: 0.2 %
LYMPHOCYTES # BLD: 29.4 %
LYMPHOCYTES ABSOLUTE: 3.2 THOU/MM3 (ref 1–4.8)
MCH RBC QN AUTO: 30.4 PG (ref 26–33)
MCHC RBC AUTO-ENTMCNC: 32.7 GM/DL (ref 32.2–35.5)
MCV RBC AUTO: 93 FL (ref 81–99)
MONOCYTES # BLD: 5.5 %
MONOCYTES ABSOLUTE: 0.6 THOU/MM3 (ref 0.4–1.3)
NUCLEATED RED BLOOD CELLS: 0 /100 WBC
PLATELET # BLD: 298 THOU/MM3 (ref 130–400)
PMV BLD AUTO: 10.9 FL (ref 9.4–12.4)
RBC # BLD: 4.6 MILL/MM3 (ref 4.2–5.4)
SEG NEUTROPHILS: 61.8 %
SEGMENTED NEUTROPHILS ABSOLUTE COUNT: 6.7 THOU/MM3 (ref 1.8–7.7)
WBC # BLD: 10.9 THOU/MM3 (ref 4.8–10.8)

## 2020-05-19 PROCEDURE — 99214 OFFICE O/P EST MOD 30 MIN: CPT | Performed by: FAMILY MEDICINE

## 2020-05-19 RX ORDER — HYDROCHLOROTHIAZIDE 25 MG/1
25 TABLET ORAL DAILY
Qty: 30 TABLET | Refills: 0 | Status: SHIPPED | OUTPATIENT
Start: 2020-05-19 | End: 2020-06-03

## 2020-05-19 RX ORDER — ONDANSETRON 4 MG/1
4 TABLET, ORALLY DISINTEGRATING ORAL EVERY 8 HOURS PRN
Qty: 20 TABLET | Refills: 0 | Status: SHIPPED | OUTPATIENT
Start: 2020-05-19 | End: 2020-10-03

## 2020-05-19 RX ORDER — HYDROCHLOROTHIAZIDE 12.5 MG/1
12.5 CAPSULE, GELATIN COATED ORAL EVERY MORNING
Qty: 30 CAPSULE | Refills: 0 | Status: CANCELLED | OUTPATIENT
Start: 2020-05-19

## 2020-05-19 ASSESSMENT — ENCOUNTER SYMPTOMS
DIARRHEA: 0
WHEEZING: 0
CONSTIPATION: 0
EYE DISCHARGE: 0
COUGH: 0
ABDOMINAL PAIN: 0
RHINORRHEA: 0
SHORTNESS OF BREATH: 0
NAUSEA: 1
SORE THROAT: 0

## 2020-05-22 ENCOUNTER — HOSPITAL ENCOUNTER (OUTPATIENT)
Dept: WOMENS IMAGING | Age: 45
Discharge: HOME | End: 2020-05-22
Payer: COMMERCIAL

## 2020-05-22 PROCEDURE — G0279 TOMOSYNTHESIS, MAMMO: HCPCS

## 2020-06-03 ENCOUNTER — OFFICE VISIT (OUTPATIENT)
Dept: FAMILY MEDICINE CLINIC | Age: 45
End: 2020-06-03
Payer: COMMERCIAL

## 2020-06-03 VITALS
TEMPERATURE: 97.1 F | HEIGHT: 67 IN | WEIGHT: 293 LBS | DIASTOLIC BLOOD PRESSURE: 82 MMHG | BODY MASS INDEX: 45.99 KG/M2 | OXYGEN SATURATION: 97 % | HEART RATE: 69 BPM | SYSTOLIC BLOOD PRESSURE: 142 MMHG | RESPIRATION RATE: 20 BRPM

## 2020-06-03 PROCEDURE — 99213 OFFICE O/P EST LOW 20 MIN: CPT | Performed by: FAMILY MEDICINE

## 2020-06-03 RX ORDER — LISINOPRIL AND HYDROCHLOROTHIAZIDE 25; 20 MG/1; MG/1
1 TABLET ORAL DAILY
Qty: 90 TABLET | Refills: 3 | Status: SHIPPED | OUTPATIENT
Start: 2020-06-03 | End: 2021-03-03 | Stop reason: SDUPTHER

## 2020-06-03 ASSESSMENT — ENCOUNTER SYMPTOMS
WHEEZING: 0
SORE THROAT: 0
NAUSEA: 0
DIARRHEA: 0
ORTHOPNEA: 0
SHORTNESS OF BREATH: 0
RHINORRHEA: 0
CONSTIPATION: 0
BLURRED VISION: 0
ABDOMINAL PAIN: 0
COUGH: 0

## 2020-06-03 NOTE — PROGRESS NOTES
[address] [place] [address]  [address] [place] [address]  Dept: [phone]  Dept Fax: [phone]  Loc: [phone]    Norman Lentz is a 41 y.o. female who presents today for her medical conditions/complaints as noted below. Chief Complaint   Patient presents with    Hypertension     here for 2 week check up and has had less headaches has a log this 166/70 But has ranging 136/64 to 166/70. HPI:     Hypertension   This is a new problem. The current episode started more than 1 month ago. The problem has been gradually improving since onset. The problem is uncontrolled. Associated symptoms include headaches (less frequent since starting hctz). Pertinent negatives include no anxiety, blurred vision, chest pain, malaise/fatigue, neck pain, orthopnea, palpitations, peripheral edema, PND, shortness of breath or sweats. There are no associated agents to hypertension. Risk factors for coronary artery disease include family history and obesity. Past treatments include diuretics. The current treatment provides moderate improvement. Compliance problems include exercise. There is no history of angina, CVA, heart failure or left ventricular hypertrophy. There is no history of chronic renal disease, coarctation of the aorta, hyperaldosteronism, hypercortisolism or a hypertension causing med.        Current Outpatient Medications   Medication Sig Dispense Refill    lisinopril-hydroCHLOROthiazide (PRINZIDE;ZESTORETIC) 20-25 MG per tablet Take 1 tablet by mouth daily 90 tablet 3    ondansetron (ZOFRAN ODT) 4 MG disintegrating tablet Take 1 tablet by mouth every 8 hours as needed for Nausea or Vomiting 20 tablet 0    hydroCHLOROthiazide (HYDRODIURIL) 25 MG tablet Take 1 tablet by mouth daily 30 tablet 0    escitalopram (LEXAPRO) 20 MG tablet Take 1 tablet by mouth daily 90 tablet 3    lansoprazole (PREVACID) 30 MG delayed release capsule TAKE 1 No scleral icterus. Right eye: No discharge. Left eye: No discharge. Conjunctiva/sclera: Conjunctivae normal.   Neck:      Musculoskeletal: Normal range of motion. Cardiovascular:      Rate and Rhythm: Normal rate and regular rhythm. Heart sounds: Normal heart sounds. Pulmonary:      Effort: Pulmonary effort is normal.      Breath sounds: Normal breath sounds. No wheezing. Abdominal:      General: Bowel sounds are normal. There is no distension. Palpations: Abdomen is soft. Tenderness: There is no abdominal tenderness. Musculoskeletal:         General: No tenderness. Lymphadenopathy:      Cervical: No cervical adenopathy. Skin:     General: Skin is warm and dry. Neurological:      Mental Status: She is alert and oriented to person, place, and time. Coordination: Coordination normal.   Psychiatric:         Behavior: Behavior normal.         Thought Content: Thought content normal.         Judgment: Judgment normal.           Assessment:       Diagnosis Orders   1. Essential hypertension  lisinopril-hydroCHLOROthiazide (PRINZIDE;ZESTORETIC) 20-25 MG per tablet       Plan: Bp still running elevated. Will add lisinopril for combo with hctz  Pt to call in bp log in 2 wks    Azeem Hernandez received counseling on the following healthy behaviors: nutrition and exercise    Patient given educational materials on Hypertension    I haveinstructed Azeem Hernandez to complete a self tracking handout on Blood Pressures  and instructed them to bring it with them to her next appointment. Discussed use, benefit, and side effects of prescribed medications. Barriers to medication compliance addressed. All patient questions answered. Pt voiced understanding. No follow-ups on file. No orders of the defined types were placed in this encounter.     Orders Placed This Encounter   Medications    lisinopril-hydroCHLOROthiazide (PRINZIDE;ZESTORETIC) 20-25 MG per tablet     Sig: Take 1 tablet by mouth daily     Dispense:  90 tablet     Refill:  3           Electronically signed by Benjamin Jesus MD on 6/3/2020 at 4:27 PM

## 2020-10-03 ENCOUNTER — HOSPITAL ENCOUNTER (EMERGENCY)
Age: 45
Discharge: HOME | End: 2020-10-03
Attending: EMERGENCY MEDICINE
Payer: COMMERCIAL

## 2020-10-03 VITALS
HEART RATE: 98 BPM | OXYGEN SATURATION: 93 % | SYSTOLIC BLOOD PRESSURE: 129 MMHG | DIASTOLIC BLOOD PRESSURE: 77 MMHG | TEMPERATURE: 98.9 F | RESPIRATION RATE: 16 BRPM

## 2020-10-03 LAB
ALBUMIN SERPL-MCNC: 3.6 GM/DL (ref 3.4–5)
ALP BLD-CCNC: 71 U/L (ref 46–116)
ALT SERPL-CCNC: 25 U/L (ref 14–63)
ANION GAP: 10 MEQ/L (ref 8–16)
AST SERPL-CCNC: 22 U/L (ref 15–37)
BASOPHILS # BLD: 0.3 % (ref 0–3)
BILIRUB SERPL-MCNC: 0.4 MG/DL (ref 0.2–1)
BUN BLDV-MCNC: 9 MG/DL (ref 7–18)
CHLORIDE BLD-SCNC: 103 MEQ/L (ref 98–107)
CO2: 28 MEQ/L (ref 21–32)
CREAT SERPL-MCNC: 0.7 MG/DL (ref 0.6–1.3)
EOSINOPHILS RELATIVE PERCENT: 1.1 % (ref 0–4)
GFR, ESTIMATED: > 90 ML/MIN/1.73M2
GLUCOSE BLD-MCNC: 100 MG/DL (ref 74–106)
HCT VFR BLD CALC: 43.8 % (ref 37–47)
HEMOGLOBIN: 14.5 GM/DL (ref 12–16)
LYMPHOCYTES # BLD: 9.1 % (ref 15–47)
MCH RBC QN AUTO: 30.9 PG (ref 27–31)
MCHC RBC AUTO-ENTMCNC: 33.1 GM/DL (ref 33–37)
MCV RBC AUTO: 93.3 FL (ref 81–99)
MONOCYTES: 7.7 % (ref 0–12)
PDW BLD-RTO: 13.1 % (ref 11.5–14.5)
PLATELET # BLD: 228 THOU/MM3 (ref 130–400)
PMV BLD AUTO: 7.6 FL (ref 7.4–10.4)
POC CALCIUM: 9.3 MG/DL (ref 8.5–10.1)
POTASSIUM SERPL-SCNC: 3.9 MEQ/L (ref 3.5–5.1)
RBC # BLD: 4.69 MILL/MM3 (ref 4.2–5.4)
SEGS: 81.8 % (ref 43–75)
SODIUM BLD-SCNC: 141 MEQ/L (ref 136–145)
TOTAL PROTEIN: 7.1 GM/DL (ref 6.4–8.2)
WBC # BLD: 7.3 THOU/MM3 (ref 4.8–10.8)

## 2020-10-03 PROCEDURE — 96361 HYDRATE IV INFUSION ADD-ON: CPT

## 2020-10-03 PROCEDURE — 2580000003 HC RX 258: Performed by: EMERGENCY MEDICINE

## 2020-10-03 PROCEDURE — 96374 THER/PROPH/DIAG INJ IV PUSH: CPT

## 2020-10-03 PROCEDURE — 99283 EMERGENCY DEPT VISIT LOW MDM: CPT

## 2020-10-03 PROCEDURE — 2709999900 HC NON-CHARGEABLE SUPPLY

## 2020-10-03 PROCEDURE — 99282 EMERGENCY DEPT VISIT SF MDM: CPT

## 2020-10-03 PROCEDURE — 6360000002 HC RX W HCPCS: Performed by: EMERGENCY MEDICINE

## 2020-10-03 PROCEDURE — 36415 COLL VENOUS BLD VENIPUNCTURE: CPT

## 2020-10-03 PROCEDURE — 85025 COMPLETE CBC W/AUTO DIFF WBC: CPT

## 2020-10-03 PROCEDURE — 96375 TX/PRO/DX INJ NEW DRUG ADDON: CPT

## 2020-10-03 PROCEDURE — 80053 COMPREHEN METABOLIC PANEL: CPT

## 2020-10-03 RX ORDER — METOCLOPRAMIDE HYDROCHLORIDE 5 MG/ML
10 INJECTION INTRAMUSCULAR; INTRAVENOUS ONCE
Status: COMPLETED | OUTPATIENT
Start: 2020-10-03 | End: 2020-10-03

## 2020-10-03 RX ORDER — ONDANSETRON 4 MG/1
4 TABLET, ORALLY DISINTEGRATING ORAL EVERY 8 HOURS PRN
Qty: 15 TABLET | Refills: 0 | Status: SHIPPED | OUTPATIENT
Start: 2020-10-03 | End: 2021-10-13 | Stop reason: SDUPTHER

## 2020-10-03 RX ORDER — BUTALBITAL, ACETAMINOPHEN AND CAFFEINE 50; 325; 40 MG/1; MG/1; MG/1
1 TABLET ORAL EVERY 4 HOURS PRN
Qty: 180 TABLET | Refills: 3 | Status: SHIPPED | OUTPATIENT
Start: 2020-10-03 | End: 2020-10-03 | Stop reason: SDUPTHER

## 2020-10-03 RX ORDER — KETOROLAC TROMETHAMINE 30 MG/ML
15 INJECTION, SOLUTION INTRAMUSCULAR; INTRAVENOUS ONCE
Status: COMPLETED | OUTPATIENT
Start: 2020-10-03 | End: 2020-10-03

## 2020-10-03 RX ORDER — 0.9 % SODIUM CHLORIDE 0.9 %
1000 INTRAVENOUS SOLUTION INTRAVENOUS ONCE
Status: COMPLETED | OUTPATIENT
Start: 2020-10-03 | End: 2020-10-03

## 2020-10-03 RX ORDER — ONDANSETRON 2 MG/ML
4 INJECTION INTRAMUSCULAR; INTRAVENOUS ONCE
Status: COMPLETED | OUTPATIENT
Start: 2020-10-03 | End: 2020-10-03

## 2020-10-03 RX ORDER — PROMETHAZINE HYDROCHLORIDE 25 MG/1
25 TABLET ORAL EVERY 6 HOURS PRN
Qty: 20 TABLET | Refills: 0 | Status: SHIPPED | OUTPATIENT
Start: 2020-10-03 | End: 2020-10-10

## 2020-10-03 RX ORDER — BUTALBITAL, ACETAMINOPHEN AND CAFFEINE 50; 325; 40 MG/1; MG/1; MG/1
1 TABLET ORAL EVERY 4 HOURS PRN
Qty: 20 TABLET | Refills: 0 | Status: SHIPPED | OUTPATIENT
Start: 2020-10-03 | End: 2021-03-03 | Stop reason: SDUPTHER

## 2020-10-03 RX ADMIN — METOCLOPRAMIDE 10 MG: 5 INJECTION, SOLUTION INTRAMUSCULAR; INTRAVENOUS at 11:33

## 2020-10-03 RX ADMIN — KETOROLAC TROMETHAMINE 15 MG: 30 INJECTION, SOLUTION INTRAMUSCULAR; INTRAVENOUS at 11:31

## 2020-10-03 RX ADMIN — HYDROMORPHONE HYDROCHLORIDE 1 MG: 1 INJECTION, SOLUTION INTRAMUSCULAR; INTRAVENOUS; SUBCUTANEOUS at 12:17

## 2020-10-03 RX ADMIN — SODIUM CHLORIDE 1000 ML: 9 INJECTION, SOLUTION INTRAVENOUS at 11:29

## 2020-10-03 RX ADMIN — ONDANSETRON 4 MG: 2 INJECTION INTRAMUSCULAR; INTRAVENOUS at 11:31

## 2020-10-03 ASSESSMENT — PAIN SCALES - GENERAL
PAINLEVEL_OUTOF10: 8
PAINLEVEL_OUTOF10: 10
PAINLEVEL_OUTOF10: 9
PAINLEVEL_OUTOF10: 9

## 2020-10-03 NOTE — ED NOTES
Pt presents amb with spouse. Vomiting in container. States woke up at 200 am with headache and vomiting. Pt states she does get headaches periodically. Alert, oriented. Moving all extremities well.  in with pt.        Bowen Bennett RN  10/03/20 0089

## 2020-10-03 NOTE — ED NOTES
Up in room. Headache better. Nausea better. Pt released ambulatory in stable condition. Offered w/c, but declined. Resp easy, non-labored. Skin warm, dry. Color pink. AVS and scripts reviewed. Pt voiced understanding.      Robert Millan RN  10/03/20 [id number]

## 2020-10-03 NOTE — ED PROVIDER NOTES
[address]  [facility] [address]  Phone: [address]    Chief Complaint   Patient presents with    Headache       HPI    Ever Bloom is a 41 y.o. female who presents above-noted complaint. Patient presents with a headache. Patient has a longstanding history of some migraines although usually once or twice a year. Subsequently woke up at 2:00 this morning with a pounding headache. No associate symptoms such as high fever chills neck pain chest pain abdominal pain or other issues. She was nauseated and did vomit. Denies weakness or other issues.     PAST MEDICAL HISTORY    Past Medical History:   Diagnosis Date    Headache(784.0)     Hypertension     Infertility     Nausea & vomiting     with c birth    YIFAN on CPAP     Polycystic ovaries     PONV (postoperative nausea and vomiting)     PVC (premature ventricular contraction)     seen by Dr. Deny Sharpe, echo done (normal) - decreased caffeine & PVC are less    Thyroid disease        SURGICAL HISTORY    Past Surgical History:   Procedure Laterality Date    APPENDECTOMY  2004     SECTION      CHOLECYSTECTOMY      CYST REMOVAL      ovarian    DILATION AND CURETTAGE OF UTERUS  2014    HYSTEROSCOPY  6/3/14    Dilation and Curettage    KNEE ARTHROSCOPY      OTHER SURGICAL HISTORY Right 2015    Right Knee Arthroscopy    SLEEVE GASTRECTOMY  10/10/2016    robotic    STOMACH SURGERY      gastric sleeve    THYROIDECTOMY Bilateral 2016    TOOTH EXTRACTION Right 13    right lower       CURRENT MEDICATIONS    Current Outpatient Rx   Medication Sig Dispense Refill    ondansetron (ZOFRAN ODT) 4 MG disintegrating tablet Take 1 tablet by mouth every 8 hours as needed for Nausea or Vomiting 15 tablet 0    promethazine (PHENERGAN) 25 MG tablet Take 1 tablet by mouth every 6 hours as needed for Nausea 20 tablet 0    butalbital-acetaminophen-caffeine (FIORICET, ESGIC) -40 MG per tablet Take 1 tablet by mouth every 4 hours as needed for Headaches 20 tablet 0    lisinopril-hydroCHLOROthiazide (PRINZIDE;ZESTORETIC) 20-25 MG per tablet Take 1 tablet by mouth daily 90 tablet 3    escitalopram (LEXAPRO) 20 MG tablet Take 1 tablet by mouth daily 90 tablet 3    lansoprazole (PREVACID) 30 MG delayed release capsule TAKE 1 CAPSULE BY MOUTH ONE TIME A DAY 90 capsule 3    vitamin B-12 (CYANOCOBALAMIN) 1000 MCG tablet Take 1,000 mcg by mouth daily      Biotin 1000 MCG TABS Take 10,000 mcg by mouth daily      Cholecalciferol (VITAMIN D3) 97675 UNITS CAPS Take 1 capsule by mouth once a week (Patient taking differently: Take 1 capsule by mouth daily ) 4 capsule 1    calcium carbonate (OSCAL) 500 MG TABS tablet Take 500 mg by mouth 3 times daily      levothyroxine (SYNTHROID) 200 MCG tablet Take 225 mcg by mouth Daily      multivitamin (THERAGRAN) per tablet Take 1 tablet by mouth daily. ALLERGIES    No Known Allergies    FAMILY HISTORY    Family History   Problem Relation Age of Onset    Hypertension Mother    [facility] Arthritis Mother     Other Mother         at [facility]    Obesity Father     Lung Cancer Father     Obesity Brother     Heart Attack Paternal Grandfather     Cancer Brother        SOCIAL HISTORY    Social History     Socioeconomic History    Marital status:      Spouse name: None    Number of children: None    Years of education: None    Highest education level: None   Occupational History    None   Social Needs    Financial resource strain: None    Food insecurity     Worry: None     Inability: None    Transportation needs     Medical: None     Non-medical: None   Tobacco Use    Smoking status: Never Smoker    Smokeless tobacco: Never Used   Substance and Sexual Activity    Alcohol use:  Yes     Alcohol/week: 0.0 standard drinks     Comment: social    Drug use: No    Sexual activity: Yes Lifestyle    Physical activity     Days per week: None     Minutes per session: None    Stress: None   Relationships    Social connections     Talks on phone: None     Gets together: None     Attends Mosque service: None     Active member of club or organization: None     Attends meetings of clubs or organizations: None     Relationship status: None    Intimate partner violence     Fear of current or ex partner: None     Emotionally abused: None     Physically abused: None     Forced sexual activity: None   Other Topics Concern    None   Social History Narrative    None       REVIEW OF SYSTEMS    Positive for headache with nausea vomiting. Some photophobia. All systems negative except as marked. PHYSICAL EXAM    VITAL SIGNS: /77   Pulse 98   Temp 98.9 °F (37.2 °C) (Temporal)   Resp 16   SpO2 93%    Constitutional:  Alert not toxic or ill, able to give coherent history  HENT: COVID mask protection in place normocephalic, Atraumatic, mask lowered to assess  Bilateral external ears normal, Oropharynx moist, No oral exudates, Nose normal.  Cervical Spine: Normal range of motion,  No stridor. No tenderness, Supple,  Eyes:  No discharge or  Swelling,Conjunctiva normal, PERRL, EOMI,  Respiratory: No respiratory distress, Normal breath sounds,  No wheezing, No chest tenderness. Cardiovascular:  Normal heart rate, Normal rhythm, No murmurs, No rubs, No gallops. GI:  No reproducible pain,   Musculoskeletal:  Intact distal pulses,   Integument:  Warm, Dry, No erythema, No rash (on exposed areas)   Lymphatic:  No lymphadenopathy noted. Neurologic:  Alert & oriented x 3, Normal motor function, Normal sensory function, No focal deficits noted.   Finger-to-nose is normal  Psychiatric:  Affect normal, Judgment normal, Mood normal.              RADIOLOGY    No orders to display       PROCEDURES    none      CONSULTS:  None      CRITICAL CARE:  None    SCREENINGS  /77   Pulse 98   Temp 98.9 °F every 4 hours as needed for Headaches    ONDANSETRON (ZOFRAN ODT) 4 MG DISINTEGRATING TABLET    Take 1 tablet by mouth every 8 hours as needed for Nausea or Vomiting    PROMETHAZINE (PHENERGAN) 25 MG TABLET    Take 1 tablet by mouth every 6 hours as needed for Nausea           Erlin Cooley MD  10/03/20 9930

## 2020-10-03 NOTE — ED NOTES
Pt still restless. States is did not help much yet.      Sulma Manjarrez, RN  10/03/20 [address] [facility], RN  10/03/20 1141

## 2020-10-08 ENCOUNTER — VIRTUAL VISIT (OUTPATIENT)
Dept: FAMILY MEDICINE CLINIC | Age: 45
End: 2020-10-08
Payer: COMMERCIAL

## 2020-10-08 PROCEDURE — 99213 OFFICE O/P EST LOW 20 MIN: CPT | Performed by: FAMILY MEDICINE

## 2020-10-08 PROCEDURE — G8428 CUR MEDS NOT DOCUMENT: HCPCS | Performed by: FAMILY MEDICINE

## 2020-10-08 RX ORDER — AZITHROMYCIN 250 MG/1
250 TABLET, FILM COATED ORAL SEE ADMIN INSTRUCTIONS
Qty: 6 TABLET | Refills: 0 | Status: SHIPPED | OUTPATIENT
Start: 2020-10-08 | End: 2020-10-13

## 2020-10-08 ASSESSMENT — ENCOUNTER SYMPTOMS
VOMITING: 1
SINUS PAIN: 1
CONSTIPATION: 0
NAUSEA: 1
RHINORRHEA: 0
WHEEZING: 0
COUGH: 1
SHORTNESS OF BREATH: 0
SORE THROAT: 0
SINUS PRESSURE: 1
EYE DISCHARGE: 0
DIARRHEA: 0
ABDOMINAL PAIN: 0

## 2020-10-08 NOTE — PROGRESS NOTES
and Curettage    KNEE ARTHROSCOPY      OTHER SURGICAL HISTORY Right 12/04/2015    Right Knee Arthroscopy    SLEEVE GASTRECTOMY  10/10/2016    robotic    STOMACH SURGERY      gastric sleeve    THYROIDECTOMY Bilateral 09/2016    TOOTH EXTRACTION Right 5/30/13    right lower       Social History     Tobacco Use    Smoking status: Never Smoker    Smokeless tobacco: Never Used   Substance Use Topics    Alcohol use: Yes     Alcohol/week: 0.0 standard drinks     Comment: social    Drug use: No       Family History   Problem Relation Age of Onset    Hypertension Mother    Jannell Outhouse Arthritis Mother     Other Mother         at fib    Obesity Father     Lung Cancer Father     Obesity Brother     Heart Attack Paternal Grandfather     Cancer Brother          Review of Systems   Constitutional: Positive for activity change, appetite change and fatigue. Negative for chills and fever. HENT: Positive for congestion, postnasal drip, sinus pressure and sinus pain. Negative for rhinorrhea and sore throat. Eyes: Negative for discharge and visual disturbance. Respiratory: Positive for cough. Negative for shortness of breath and wheezing. Cardiovascular: Negative for chest pain and palpitations. Gastrointestinal: Positive for nausea and vomiting. Negative for abdominal pain, constipation and diarrhea. Genitourinary: Negative for dysuria and hematuria. Musculoskeletal: Negative for arthralgias and myalgias. Neurological: Positive for headaches. Negative for dizziness. Psychiatric/Behavioral: Positive for sleep disturbance. The patient is not nervous/anxious. PHYSICAL EXAM:    Physical Exam  Constitutional:       General: She is not in acute distress. Appearance: Normal appearance. HENT:      Head: Normocephalic and atraumatic. Eyes:      General: No scleral icterus. Pulmonary:      Effort: Pulmonary effort is normal. No respiratory distress. Skin:     Coloration: Skin is not jaundiced. Neurological:      Mental Status: She is alert and oriented to person, place, and time. Psychiatric:         Mood and Affect: Mood normal.         Behavior: Behavior normal.         Thought Content: Thought content normal.         Judgment: Judgment normal.          ASSESSMENT & PLAN  Samira Mcgowan was seen today for headache. Diagnoses and all orders for this visit:    Acute nonintractable headache, unspecified headache type  -     COVID-19 Ambulatory; Future    Cough  -     azithromycin (ZITHROMAX) 250 MG tablet; Take 1 tablet by mouth See Admin Instructions for 5 days 500mg on day 1 followed by 250mg on days 2 - 5      Will test for covid. Quarantine until results are back. Does have hx of pneumnia, so will give zpak in case covid negative. Warning signs discussed. No follow-ups on file. Reviewed prior labs and health maintenance. Discussed use, benefit, and side effects of prescribed medications. Barriers to medication compliance addressed. All patient questions answered. Patient voiced understanding. Pursuant to the emergency declaration under the [facility], [facility] waiver authority and the [employer] and [employer] Act, this Virtual  Visit was conducted, with patient's consent, to reduce the patient's risk of exposure to COVID-19 and provide continuity of care for an established patient. Services were provided through a video synchronous discussion virtually to substitute for in-person clinic visit.     Electronically signed by Bonnie Melgar MD on 10/8/2020 at 3:04 PM

## 2020-11-24 ENCOUNTER — TELEPHONE (OUTPATIENT)
Dept: FAMILY MEDICINE CLINIC | Age: 45
End: 2020-11-24

## 2020-11-24 NOTE — TELEPHONE ENCOUNTER
Patient called stating that she needed her positive COVID test result faxed to her work, employee health. Called employee health at [phone] and LM for them to call back with the fax number.       Result in tray awaiting fax

## 2021-03-03 ENCOUNTER — OFFICE VISIT (OUTPATIENT)
Dept: FAMILY MEDICINE CLINIC | Age: 46
End: 2021-03-03
Payer: COMMERCIAL

## 2021-03-03 VITALS
HEART RATE: 64 BPM | RESPIRATION RATE: 18 BRPM | TEMPERATURE: 97.7 F | SYSTOLIC BLOOD PRESSURE: 136 MMHG | BODY MASS INDEX: 49.02 KG/M2 | OXYGEN SATURATION: 98 % | DIASTOLIC BLOOD PRESSURE: 96 MMHG | WEIGHT: 293 LBS

## 2021-03-03 DIAGNOSIS — F43.21 GRIEF: ICD-10-CM

## 2021-03-03 DIAGNOSIS — F41.1 GENERALIZED ANXIETY DISORDER: ICD-10-CM

## 2021-03-03 DIAGNOSIS — G43.909 MIGRAINE WITHOUT STATUS MIGRAINOSUS, NOT INTRACTABLE, UNSPECIFIED MIGRAINE TYPE: ICD-10-CM

## 2021-03-03 DIAGNOSIS — I10 ESSENTIAL HYPERTENSION: Primary | ICD-10-CM

## 2021-03-03 PROCEDURE — G8427 DOCREV CUR MEDS BY ELIG CLIN: HCPCS | Performed by: FAMILY MEDICINE

## 2021-03-03 PROCEDURE — G8417 CALC BMI ABV UP PARAM F/U: HCPCS | Performed by: FAMILY MEDICINE

## 2021-03-03 PROCEDURE — G8484 FLU IMMUNIZE NO ADMIN: HCPCS | Performed by: FAMILY MEDICINE

## 2021-03-03 PROCEDURE — 99214 OFFICE O/P EST MOD 30 MIN: CPT | Performed by: FAMILY MEDICINE

## 2021-03-03 PROCEDURE — 1036F TOBACCO NON-USER: CPT | Performed by: FAMILY MEDICINE

## 2021-03-03 RX ORDER — BUTALBITAL, ACETAMINOPHEN AND CAFFEINE 50; 325; 40 MG/1; MG/1; MG/1
1 TABLET ORAL EVERY 4 HOURS PRN
Qty: 60 TABLET | Refills: 3 | Status: SHIPPED | OUTPATIENT
Start: 2021-03-03 | End: 2021-10-13 | Stop reason: SDUPTHER

## 2021-03-03 RX ORDER — ESCITALOPRAM OXALATE 20 MG/1
20 TABLET ORAL DAILY
Qty: 90 TABLET | Refills: 3 | Status: SHIPPED | OUTPATIENT
Start: 2021-03-03 | End: 2022-03-15

## 2021-03-03 RX ORDER — UREA 10 %
LOTION (ML) TOPICAL
COMMUNITY
Start: 2020-11-03 | End: 2021-03-03 | Stop reason: ALTCHOICE

## 2021-03-03 RX ORDER — LISINOPRIL AND HYDROCHLOROTHIAZIDE 25; 20 MG/1; MG/1
1 TABLET ORAL DAILY
Qty: 90 TABLET | Refills: 3 | Status: SHIPPED | OUTPATIENT
Start: 2021-03-03 | End: 2022-03-15

## 2021-03-03 SDOH — ECONOMIC STABILITY: FOOD INSECURITY: WITHIN THE PAST 12 MONTHS, THE FOOD YOU BOUGHT JUST DIDN'T LAST AND YOU DIDN'T HAVE MONEY TO GET MORE.: NEVER TRUE

## 2021-03-03 SDOH — ECONOMIC STABILITY: INCOME INSECURITY: HOW HARD IS IT FOR YOU TO PAY FOR THE VERY BASICS LIKE FOOD, HOUSING, MEDICAL CARE, AND HEATING?: NOT HARD AT ALL

## 2021-03-03 SDOH — ECONOMIC STABILITY: FOOD INSECURITY: WITHIN THE PAST 12 MONTHS, YOU WORRIED THAT YOUR FOOD WOULD RUN OUT BEFORE YOU GOT MONEY TO BUY MORE.: NEVER TRUE

## 2021-03-03 ASSESSMENT — PATIENT HEALTH QUESTIONNAIRE - PHQ9
SUM OF ALL RESPONSES TO PHQ QUESTIONS 1-9: 0
1. LITTLE INTEREST OR PLEASURE IN DOING THINGS: 0
SUM OF ALL RESPONSES TO PHQ9 QUESTIONS 1 & 2: 0
2. FEELING DOWN, DEPRESSED OR HOPELESS: 0

## 2021-03-03 NOTE — PROGRESS NOTES
Called Script in to [employer] in [place] since E-scribe failed.
Vitals signs and nursing note reviewed. Constitutional:       Appearance: She is well-developed. HENT:      Head: Normocephalic and atraumatic. Eyes:      General: No scleral icterus. Right eye: No discharge. Left eye: No discharge. Conjunctiva/sclera: Conjunctivae normal.   Cardiovascular:      Rate and Rhythm: Normal rate and regular rhythm. Heart sounds: Normal heart sounds. Pulmonary:      Effort: Pulmonary effort is normal.      Breath sounds: Normal breath sounds. No wheezing. Skin:     General: Skin is warm and dry. Neurological:      Mental Status: She is alert and oriented to person, place, and time. Mental status is at baseline. Psychiatric:         Mood and Affect: Affect is labile and tearful. Behavior: Behavior normal.         Thought Content: Thought content normal.         Cognition and Memory: Cognition and memory normal.         Judgment: Judgment normal.         Vitals:    03/03/21 1547   BP: (!) 136/96   Pulse:    Resp:    Temp:    SpO2:         On this date 3/3/2021 I have spent 35 minutes reviewing previous notes, test results and face to face with the patient discussing the diagnosis and importance of compliance with the treatment plan as well as documenting on the day of the visit. An electronic signature was used to authenticate this note.     --Vijay Valadez MD

## 2021-03-31 ENCOUNTER — OFFICE VISIT (OUTPATIENT)
Dept: FAMILY MEDICINE CLINIC | Age: 46
End: 2021-03-31
Payer: COMMERCIAL

## 2021-03-31 VITALS
DIASTOLIC BLOOD PRESSURE: 84 MMHG | OXYGEN SATURATION: 98 % | SYSTOLIC BLOOD PRESSURE: 138 MMHG | BODY MASS INDEX: 49.09 KG/M2 | TEMPERATURE: 97.9 F | WEIGHT: 293 LBS | HEART RATE: 68 BPM

## 2021-03-31 DIAGNOSIS — F41.1 GENERALIZED ANXIETY DISORDER: ICD-10-CM

## 2021-03-31 DIAGNOSIS — I10 ESSENTIAL HYPERTENSION: Primary | ICD-10-CM

## 2021-03-31 PROCEDURE — G8427 DOCREV CUR MEDS BY ELIG CLIN: HCPCS | Performed by: FAMILY MEDICINE

## 2021-03-31 PROCEDURE — G8484 FLU IMMUNIZE NO ADMIN: HCPCS | Performed by: FAMILY MEDICINE

## 2021-03-31 PROCEDURE — 99213 OFFICE O/P EST LOW 20 MIN: CPT | Performed by: FAMILY MEDICINE

## 2021-03-31 PROCEDURE — G8417 CALC BMI ABV UP PARAM F/U: HCPCS | Performed by: FAMILY MEDICINE

## 2021-03-31 PROCEDURE — 1036F TOBACCO NON-USER: CPT | Performed by: FAMILY MEDICINE

## 2021-03-31 ASSESSMENT — ENCOUNTER SYMPTOMS: SHORTNESS OF BREATH: 0

## 2021-03-31 NOTE — PROGRESS NOTES
[facility]  [address] [place] [address]  Dept: [phone]  Loc: [address] (:  1975) is a 40 y.o. female, here for evaluation of the following chief complaint(s):  Follow-up      ASSESSMENT/PLAN:  1. Essential hypertension  2. Generalized anxiety disorder    Conditions controlled. Cont meds. Follow up in 6 months or prn  Monitor bp a few times per week. If running >140, return to clinic  Return in about 6 months (around 2021) for follow up, HTN. SUBJECTIVE/OBJECTIVE:  Patient presents for 1 month recheck of hypertension and mood. She states that her mood is improved since she started regularly taking her Lexapro. She still has some stress but states overall her anxiety is less. She is sleeping well for the most part. Energy level has improved. She also has been checking her blood pressure and it typically runs 130s. She denies any headache or chest pain or shortness of breath. She is scheduled with Covid vaccine today and she is slightly anxious over this but otherwise doing well. Review of Systems   Respiratory: Negative for shortness of breath. Cardiovascular: Negative for chest pain and palpitations. Neurological: Negative for headaches. Psychiatric/Behavioral: Negative for decreased concentration and dysphoric mood. The patient is nervous/anxious. Physical Exam  Vitals signs and nursing note reviewed. Constitutional:       Appearance: She is well-developed. HENT:      Head: Normocephalic and atraumatic. Eyes:      General: No scleral icterus. Right eye: No discharge. Left eye: No discharge. Conjunctiva/sclera: Conjunctivae normal.   Cardiovascular:      Rate and Rhythm: Normal rate and regular rhythm. Heart sounds: Normal heart sounds. Pulmonary:      Effort: Pulmonary effort is normal.      Breath sounds: Normal breath sounds.  No

## 2021-05-10 ENCOUNTER — PATIENT MESSAGE (OUTPATIENT)
Dept: FAMILY MEDICINE CLINIC | Age: 46
End: 2021-05-10

## 2021-05-10 DIAGNOSIS — B37.2 YEAST DERMATITIS: Primary | ICD-10-CM

## 2021-05-10 RX ORDER — NYSTATIN 100000 [USP'U]/G
POWDER TOPICAL
Qty: 60 G | Refills: 3 | Status: SHIPPED | OUTPATIENT
Start: 2021-05-10 | End: 2022-09-26

## 2021-05-10 NOTE — TELEPHONE ENCOUNTER
From: Summer Gonsales  To: Kassy Royal MD  Sent: 5/10/2021 9:13 AM EDT  Subject: Non-Urgent Medical Question    Good Morning Dr Woodson,  I have a yeasty rash/reddened area under my stomach (fat roll). I have been exercising and walking more and I think it is from the skin on skin friction. I had this in the past, and Dr Paz November noticed it last time and ordered a Nystatin powder. Can you order that for me or do I need to see you? Please let me know. Thanks!!   Summer Gonsales

## 2021-08-23 ENCOUNTER — OFFICE VISIT (OUTPATIENT)
Dept: FAMILY MEDICINE CLINIC | Age: 46
End: 2021-08-23
Payer: COMMERCIAL

## 2021-08-23 VITALS
HEART RATE: 72 BPM | SYSTOLIC BLOOD PRESSURE: 136 MMHG | HEIGHT: 67 IN | WEIGHT: 293 LBS | TEMPERATURE: 97.5 F | DIASTOLIC BLOOD PRESSURE: 82 MMHG | OXYGEN SATURATION: 98 % | BODY MASS INDEX: 45.99 KG/M2 | RESPIRATION RATE: 18 BRPM

## 2021-08-23 DIAGNOSIS — G44.89 OTHER HEADACHE SYNDROME: ICD-10-CM

## 2021-08-23 DIAGNOSIS — F41.1 GENERALIZED ANXIETY DISORDER: ICD-10-CM

## 2021-08-23 DIAGNOSIS — R55 SYNCOPE, UNSPECIFIED SYNCOPE TYPE: Primary | ICD-10-CM

## 2021-08-23 PROCEDURE — 99214 OFFICE O/P EST MOD 30 MIN: CPT | Performed by: FAMILY MEDICINE

## 2021-08-23 PROCEDURE — G8417 CALC BMI ABV UP PARAM F/U: HCPCS | Performed by: FAMILY MEDICINE

## 2021-08-23 PROCEDURE — 1036F TOBACCO NON-USER: CPT | Performed by: FAMILY MEDICINE

## 2021-08-23 PROCEDURE — G8427 DOCREV CUR MEDS BY ELIG CLIN: HCPCS | Performed by: FAMILY MEDICINE

## 2021-08-23 PROCEDURE — 36415 COLL VENOUS BLD VENIPUNCTURE: CPT | Performed by: FAMILY MEDICINE

## 2021-08-23 RX ORDER — LORAZEPAM 0.5 MG/1
0.5 TABLET ORAL 2 TIMES DAILY PRN
Qty: 30 TABLET | Refills: 1 | Status: SHIPPED | OUTPATIENT
Start: 2021-08-23 | End: 2021-09-22

## 2021-08-23 RX ORDER — UREA 10 %
LOTION (ML) TOPICAL
COMMUNITY
Start: 2020-11-03 | End: 2021-08-23 | Stop reason: CLARIF

## 2021-08-23 ASSESSMENT — ENCOUNTER SYMPTOMS
BOWEL INCONTINENCE: 0
WHEEZING: 0
COUGH: 0
VOMITING: 0
BACK PAIN: 0
VISUAL CHANGE: 0
RHINORRHEA: 0
SORE THROAT: 0
NAUSEA: 1
CONSTIPATION: 0
DIARRHEA: 0
SHORTNESS OF BREATH: 0
ABDOMINAL PAIN: 0

## 2021-08-23 NOTE — PROGRESS NOTES
[address] [place] [address] [address] [place] [address]  Dept: [phone]  Loc: [phone]     Yandel Salazar (:  1975) is a 40 y.o. female, here for evaluation of the following chief complaint(s):  Loss of Consciousness (Last Thursday. Patient advised that she feels brain fog and unable to focus since then. ) and Other (Pap with Dr. Barrera Hackett)      ASSESSMENT/PLAN:  1. Syncope, unspecified syncope type  -     CT HEAD W WO CONTRAST; Future  -     CBC Auto Differential; Future  -     Comprehensive Metabolic Panel; Future  -     TSH with Reflex; Future  2. Other headache syndrome  -     CT HEAD W WO CONTRAST; Future  -     CBC Auto Differential; Future  -     Comprehensive Metabolic Panel; Future  -     TSH with Reflex; Future  3. Generalized anxiety disorder  -     LORazepam (ATIVAN) 0.5 MG tablet; Take 1 tablet by mouth 2 times daily as needed for Anxiety for up to 30 days. , Disp-30 tablet, R-1Normal      No follow-ups on file. SUBJECTIVE/OBJECTIVE:  Loss of Consciousness  This is a recurrent problem. The current episode started in the past 7 days. Episode frequency: once now, has happened twice in past. The problem has been resolved. She lost consciousness for a period of 1 to 5 minutes. The symptoms are aggravated by urination (did have some alcohol). Associated symptoms include dizziness, headaches, light-headedness, malaise/fatigue and nausea. Pertinent negatives include no abdominal pain, back pain, bladder incontinence, bowel incontinence, chest pain, clumsiness, fever, palpitations, slurred speech, vertigo, visual change, vomiting or weakness. Associated symptoms comments: Pressure in ears. She has tried drinking for the symptoms. The treatment provided mild relief. There is no history of arrhythmia, CAD, a clotting disorder, CVA or seizures. YIFAN       Review of Systems   Constitutional: Positive for fatigue and malaise/fatigue. Negative for chills and fever. HENT: Negative for congestion, rhinorrhea and sore throat. Respiratory: Negative for cough, shortness of breath and wheezing. Cardiovascular: Positive for syncope. Negative for chest pain and palpitations. Gastrointestinal: Positive for nausea. Negative for abdominal pain, bowel incontinence, constipation, diarrhea and vomiting. Genitourinary: Negative for bladder incontinence, dysuria and hematuria. Musculoskeletal: Negative for arthralgias, back pain and myalgias. Neurological: Positive for dizziness, syncope, light-headedness and headaches. Negative for vertigo, seizures, speech difficulty and weakness. Psychiatric/Behavioral: Negative for sleep disturbance. The patient is nervous/anxious. Physical Exam  Vitals and nursing note reviewed. Constitutional:       General: She is not in acute distress. Appearance: She is well-developed. HENT:      Head: Normocephalic and atraumatic. Right Ear: Hearing, tympanic membrane, ear canal and external ear normal.      Left Ear: Hearing, tympanic membrane, ear canal and external ear normal.      Nose:      Right Sinus: No maxillary sinus tenderness or frontal sinus tenderness. Left Sinus: No maxillary sinus tenderness or frontal sinus tenderness. Mouth/Throat:      Pharynx: No oropharyngeal exudate or posterior oropharyngeal erythema. Eyes:      General: No scleral icterus. Right eye: No discharge. Left eye: No discharge. Conjunctiva/sclera: Conjunctivae normal.      Pupils: Pupils are equal, round, and reactive to light. Cardiovascular:      Rate and Rhythm: Normal rate and regular rhythm. Heart sounds: Normal heart sounds. Pulmonary:      Effort: Pulmonary effort is normal. No respiratory distress. Breath sounds: Normal breath sounds. No wheezing. Abdominal:      General: Bowel sounds are normal. There is no distension. Palpations: Abdomen is soft. Tenderness: There is no abdominal tenderness. Musculoskeletal:         General: No tenderness. Normal range of motion. Cervical back: Normal range of motion and neck supple. Lymphadenopathy:      Cervical: No cervical adenopathy. Skin:     General: Skin is warm and dry. Findings: No rash. Neurological:      Mental Status: She is alert and oriented to person, place, and time. Motor: No abnormal muscle tone. Coordination: Coordination normal.   Psychiatric:         Behavior: Behavior normal.         Thought Content: Thought content normal.         Judgment: Judgment normal.         Vitals:    08/23/21 1525   BP: 136/82   Pulse: 72   Resp: 18   Temp: 97.5 °F (36.4 °C)   SpO2: 98%        On this date 8/23/2021 I have spent 35 minutes reviewing previous notes, test results and face to face with the patient discussing the diagnosis and importance of compliance with the treatment plan as well as documenting on the day of the visit. An electronic signature was used to authenticate this note.     --Mariano Watts MD

## 2021-08-24 DIAGNOSIS — E03.9 ACQUIRED HYPOTHYROIDISM: Primary | ICD-10-CM

## 2021-08-24 LAB
ALBUMIN SERPL-MCNC: 4.3 G/DL (ref 3.5–5.1)
ALP BLD-CCNC: 75 U/L (ref 38–126)
ALT SERPL-CCNC: 16 U/L (ref 11–66)
ANION GAP SERPL CALCULATED.3IONS-SCNC: 13 MEQ/L (ref 8–16)
AST SERPL-CCNC: 23 U/L (ref 5–40)
BASOPHILS # BLD: 0.6 %
BASOPHILS ABSOLUTE: 0.1 THOU/MM3 (ref 0–0.1)
BILIRUB SERPL-MCNC: 0.4 MG/DL (ref 0.3–1.2)
BUN BLDV-MCNC: 15 MG/DL (ref 7–22)
CALCIUM SERPL-MCNC: 9.2 MG/DL (ref 8.5–10.5)
CHLORIDE BLD-SCNC: 100 MEQ/L (ref 98–111)
CO2: 28 MEQ/L (ref 23–33)
CREAT SERPL-MCNC: 0.8 MG/DL (ref 0.4–1.2)
EOSINOPHIL # BLD: 2.1 %
EOSINOPHILS ABSOLUTE: 0.2 THOU/MM3 (ref 0–0.4)
ERYTHROCYTE [DISTWIDTH] IN BLOOD BY AUTOMATED COUNT: 14 % (ref 11.5–14.5)
ERYTHROCYTE [DISTWIDTH] IN BLOOD BY AUTOMATED COUNT: 51.8 FL (ref 35–45)
GFR SERPL CREATININE-BSD FRML MDRD: 77 ML/MIN/1.73M2
GLUCOSE BLD-MCNC: 105 MG/DL (ref 70–108)
HCT VFR BLD CALC: 43.6 % (ref 37–47)
HEMOGLOBIN: 13.7 GM/DL (ref 12–16)
IMMATURE GRANS (ABS): 0.02 THOU/MM3 (ref 0–0.07)
IMMATURE GRANULOCYTES: 0.2 %
LYMPHOCYTES # BLD: 23.6 %
LYMPHOCYTES ABSOLUTE: 2.5 THOU/MM3 (ref 1–4.8)
MCH RBC QN AUTO: 31.1 PG (ref 26–33)
MCHC RBC AUTO-ENTMCNC: 31.4 GM/DL (ref 32.2–35.5)
MCV RBC AUTO: 98.9 FL (ref 81–99)
MONOCYTES # BLD: 5.8 %
MONOCYTES ABSOLUTE: 0.6 THOU/MM3 (ref 0.4–1.3)
NUCLEATED RED BLOOD CELLS: 0 /100 WBC
PLATELET # BLD: 304 THOU/MM3 (ref 130–400)
PMV BLD AUTO: 10.4 FL (ref 9.4–12.4)
POTASSIUM SERPL-SCNC: 3.8 MEQ/L (ref 3.5–5.2)
RBC # BLD: 4.41 MILL/MM3 (ref 4.2–5.4)
SEG NEUTROPHILS: 67.7 %
SEGMENTED NEUTROPHILS ABSOLUTE COUNT: 7.2 THOU/MM3 (ref 1.8–7.7)
SODIUM BLD-SCNC: 141 MEQ/L (ref 135–145)
T4 FREE: 1.08 NG/DL (ref 0.93–1.76)
TOTAL PROTEIN: 6.8 G/DL (ref 6.1–8)
TSH SERPL DL<=0.05 MIU/L-ACNC: 22.82 UIU/ML (ref 0.4–4.2)
WBC # BLD: 10.6 THOU/MM3 (ref 4.8–10.8)

## 2021-08-25 ENCOUNTER — TELEPHONE (OUTPATIENT)
Dept: FAMILY MEDICINE CLINIC | Age: 46
End: 2021-08-25

## 2021-08-25 DIAGNOSIS — R55 SYNCOPE, UNSPECIFIED SYNCOPE TYPE: Primary | ICD-10-CM

## 2021-08-25 NOTE — TELEPHONE ENCOUNTER
I spoke with the patient today regarding her labs. She advised that she received a call yesterday to schedule her CT Head. She advised that she will not schedule until the test is authorized with her insurance. Message sent to the PA department.

## 2021-08-31 NOTE — TELEPHONE ENCOUNTER
Patient informed and verbalized understanding. She is ok with going ahead and getting the MRI approved and scheduled at [facility].  The test must be after 3:30PM

## 2021-08-31 NOTE — TELEPHONE ENCOUNTER
Pam called  Unable to approve request.  They recommend and MRI of the Brain without contrast.     Please advise.      [id number]    MRI Brain without contrast

## 2021-09-01 NOTE — TELEPHONE ENCOUNTER
Submitted new PA request on [employer] for her MRI. MRI facility as [facility] as they have the only open MRI in [facility].HENNY. Left VM with patient informing her that her insurance may be contacting her in regards to this.

## 2021-09-07 NOTE — TELEPHONE ENCOUNTER
Pt stated that she is fine to got to [address] [place] [address] to have the MRI done but wants after 3:30pm. Pt is aware that this will not be an open MRI and is ok with that.

## 2021-09-08 ENCOUNTER — OFFICE VISIT (OUTPATIENT)
Dept: FAMILY MEDICINE CLINIC | Age: 46
End: 2021-09-08
Payer: COMMERCIAL

## 2021-09-08 VITALS
HEART RATE: 66 BPM | DIASTOLIC BLOOD PRESSURE: 88 MMHG | RESPIRATION RATE: 18 BRPM | BODY MASS INDEX: 49.81 KG/M2 | SYSTOLIC BLOOD PRESSURE: 136 MMHG | TEMPERATURE: 97.3 F | WEIGHT: 293 LBS | OXYGEN SATURATION: 97 %

## 2021-09-08 DIAGNOSIS — B02.9 HERPES ZOSTER WITHOUT COMPLICATION: Primary | ICD-10-CM

## 2021-09-08 PROCEDURE — 99213 OFFICE O/P EST LOW 20 MIN: CPT | Performed by: NURSE PRACTITIONER

## 2021-09-08 PROCEDURE — G8417 CALC BMI ABV UP PARAM F/U: HCPCS | Performed by: NURSE PRACTITIONER

## 2021-09-08 PROCEDURE — G8427 DOCREV CUR MEDS BY ELIG CLIN: HCPCS | Performed by: NURSE PRACTITIONER

## 2021-09-08 PROCEDURE — 1036F TOBACCO NON-USER: CPT | Performed by: NURSE PRACTITIONER

## 2021-09-08 RX ORDER — CEPHALEXIN 500 MG/1
CAPSULE ORAL
COMMUNITY
Start: 2021-09-01 | End: 2021-10-13 | Stop reason: ALTCHOICE

## 2021-09-08 RX ORDER — VALACYCLOVIR HYDROCHLORIDE 1 G/1
1000 TABLET, FILM COATED ORAL 3 TIMES DAILY
Qty: 21 TABLET | Refills: 0 | Status: SHIPPED | OUTPATIENT
Start: 2021-09-08 | End: 2021-09-15

## 2021-09-08 ASSESSMENT — ENCOUNTER SYMPTOMS
CHEST TIGHTNESS: 0
CONSTIPATION: 0
COUGH: 0
SHORTNESS OF BREATH: 0
ABDOMINAL PAIN: 0
WHEEZING: 0
SORE THROAT: 0
COLOR CHANGE: 0
BACK PAIN: 0
NAUSEA: 0
ABDOMINAL DISTENTION: 0
SINUS PRESSURE: 0
DIARRHEA: 0
STRIDOR: 0
VOMITING: 0

## 2021-09-08 NOTE — TELEPHONE ENCOUNTER
MRI approved. Scheduled patient for [facility] on 09/16/2021 @ 8:30 pm as this is there first available after 3:30. Patient informed.

## 2021-09-08 NOTE — PROGRESS NOTES
Lisset Sheikh (:  1975) is a 40 y.o. female,Established patient, here for evaluation of the following chief complaint(s):  Otalgia (left ear had antibiotic but still bothering her) and Other (now has bump on her head can't lay on left side due to pressure hurting)         ASSESSMENT/PLAN:  1. Herpes zoster without complication    Valtrex as prescribed  Offered neurontin for pain but declined  Avoid pregnant females, newborns and immunocompromised. Zyrtec daily    No follow-ups on file. Subjective   SUBJECTIVE/OBJECTIVE:  HPI    Left ear pain. Started over a week ago. Then a few days ago started with a area on the side of her head thats red and painful. Very painful to touch. Was given keflex by a different provider prior to red area showing up. Denies fevers. Feels like someone stabbing her head. Review of Systems   Constitutional: Negative for activity change, appetite change, chills, diaphoresis, fatigue, fever and unexpected weight change. HENT: Positive for ear pain. Negative for congestion, postnasal drip, sinus pressure and sore throat. Eyes: Negative for visual disturbance. Respiratory: Negative for cough, chest tightness, shortness of breath, wheezing and stridor. Cardiovascular: Negative for chest pain, palpitations and leg swelling. Gastrointestinal: Negative for abdominal distention, abdominal pain, constipation, diarrhea, nausea and vomiting. Endocrine: Negative for polydipsia, polyphagia and polyuria. Genitourinary: Negative for decreased urine volume, difficulty urinating, dysuria, flank pain, frequency, hematuria and urgency. Musculoskeletal: Negative for arthralgias, back pain, gait problem, joint swelling, myalgias and neck pain. Skin: Positive for rash. Negative for color change and pallor. Neurological: Negative for dizziness, syncope, weakness, light-headedness, numbness and headaches. Hematological: Negative for adenopathy. Psychiatric/Behavioral: Negative for behavioral problems, self-injury and sleep disturbance. The patient is not nervous/anxious. Objective   Physical Exam  Constitutional:       Appearance: Normal appearance. HENT:      Head: Normocephalic and atraumatic. Right Ear: No middle ear effusion. Left Ear: A middle ear effusion is present. Nose: Nose normal.   Cardiovascular:      Rate and Rhythm: Normal rate and regular rhythm. Pulmonary:      Effort: Pulmonary effort is normal.      Breath sounds: Normal breath sounds. Skin:     General: Skin is warm and dry. Coloration: Skin is not pale. Findings: Rash present. Neurological:      Mental Status: She is alert and oriented to person, place, and time. On this date 9/8/2021 I have spent 21 minutes reviewing previous notes, test results and face to face with the patient discussing the diagnosis and importance of compliance with the treatment plan as well as documenting on the day of the visit. An electronic signature was used to authenticate this note.     --NICO Ramirez - CNP

## 2021-09-13 ENCOUNTER — HOSPITAL ENCOUNTER (OUTPATIENT)
Dept: MRI IMAGING | Age: 46
Discharge: HOME | End: 2021-09-13
Payer: COMMERCIAL

## 2021-09-13 DIAGNOSIS — R55 SYNCOPE, UNSPECIFIED SYNCOPE TYPE: ICD-10-CM

## 2021-09-13 PROCEDURE — 70551 MRI BRAIN STEM W/O DYE: CPT

## 2021-10-13 ENCOUNTER — OFFICE VISIT (OUTPATIENT)
Dept: FAMILY MEDICINE CLINIC | Age: 46
End: 2021-10-13
Payer: COMMERCIAL

## 2021-10-13 VITALS
OXYGEN SATURATION: 98 % | RESPIRATION RATE: 18 BRPM | HEART RATE: 66 BPM | SYSTOLIC BLOOD PRESSURE: 130 MMHG | DIASTOLIC BLOOD PRESSURE: 84 MMHG | TEMPERATURE: 97.6 F

## 2021-10-13 DIAGNOSIS — G43.909 MIGRAINE WITHOUT STATUS MIGRAINOSUS, NOT INTRACTABLE, UNSPECIFIED MIGRAINE TYPE: Primary | ICD-10-CM

## 2021-10-13 DIAGNOSIS — J01.90 ACUTE NON-RECURRENT SINUSITIS, UNSPECIFIED LOCATION: ICD-10-CM

## 2021-10-13 DIAGNOSIS — Z11.59 ENCOUNTER FOR HEPATITIS C SCREENING TEST FOR LOW RISK PATIENT: ICD-10-CM

## 2021-10-13 DIAGNOSIS — F41.1 GENERALIZED ANXIETY DISORDER: ICD-10-CM

## 2021-10-13 DIAGNOSIS — Z11.4 SCREENING FOR HIV (HUMAN IMMUNODEFICIENCY VIRUS): ICD-10-CM

## 2021-10-13 DIAGNOSIS — Z13.220 SCREENING FOR LIPOID DISORDERS: ICD-10-CM

## 2021-10-13 PROCEDURE — 99214 OFFICE O/P EST MOD 30 MIN: CPT | Performed by: FAMILY MEDICINE

## 2021-10-13 PROCEDURE — G8484 FLU IMMUNIZE NO ADMIN: HCPCS | Performed by: FAMILY MEDICINE

## 2021-10-13 PROCEDURE — 1036F TOBACCO NON-USER: CPT | Performed by: FAMILY MEDICINE

## 2021-10-13 PROCEDURE — G8417 CALC BMI ABV UP PARAM F/U: HCPCS | Performed by: FAMILY MEDICINE

## 2021-10-13 PROCEDURE — G8427 DOCREV CUR MEDS BY ELIG CLIN: HCPCS | Performed by: FAMILY MEDICINE

## 2021-10-13 RX ORDER — LORAZEPAM 0.5 MG/1
0.5 TABLET ORAL 2 TIMES DAILY PRN
Qty: 30 TABLET | Refills: 2 | Status: SHIPPED | OUTPATIENT
Start: 2021-10-13 | End: 2021-11-12

## 2021-10-13 RX ORDER — AZITHROMYCIN 250 MG/1
250 TABLET, FILM COATED ORAL SEE ADMIN INSTRUCTIONS
Qty: 6 TABLET | Refills: 0 | Status: SHIPPED | OUTPATIENT
Start: 2021-10-13 | End: 2021-10-18

## 2021-10-13 RX ORDER — LEVOTHYROXINE SODIUM 0.07 MG/1
TABLET ORAL
COMMUNITY
Start: 2021-10-08

## 2021-10-13 RX ORDER — LORAZEPAM 0.5 MG/1
TABLET ORAL
COMMUNITY
Start: 2021-09-28 | End: 2021-10-13 | Stop reason: SDUPTHER

## 2021-10-13 RX ORDER — ONDANSETRON 4 MG/1
4 TABLET, ORALLY DISINTEGRATING ORAL EVERY 8 HOURS PRN
Qty: 45 TABLET | Refills: 2 | Status: SHIPPED | OUTPATIENT
Start: 2021-10-13 | End: 2022-05-02 | Stop reason: SDUPTHER

## 2021-10-13 RX ORDER — BUTALBITAL, ACETAMINOPHEN AND CAFFEINE 50; 325; 40 MG/1; MG/1; MG/1
1 TABLET ORAL EVERY 4 HOURS PRN
Qty: 60 TABLET | Refills: 3 | Status: SHIPPED | OUTPATIENT
Start: 2021-10-13

## 2021-10-13 ASSESSMENT — ENCOUNTER SYMPTOMS
SHORTNESS OF BREATH: 0
CONSTIPATION: 0
DIARRHEA: 0
COUGH: 0
SORE THROAT: 0
ABDOMINAL PAIN: 0
NAUSEA: 0
WHEEZING: 0
RHINORRHEA: 0

## 2021-10-13 NOTE — PROGRESS NOTES
[address] [place] [address] DR. [place] [address]  Dept: [phone]  Loc: [phone]     Lawrence Cano (:  1975) is a 40 y.o. female, here for evaluation of the following chief complaint(s):  6 Month Follow-Up      ASSESSMENT/PLAN:  1. Migraine without status migrainosus, not intractable, unspecified migraine type  -     ondansetron (ZOFRAN ODT) 4 MG disintegrating tablet; Take 1 tablet by mouth every 8 hours as needed for Nausea or Vomiting, Disp-45 tablet, R-2Normal  -     butalbital-acetaminophen-caffeine (FIORICET, ESGIC) -40 MG per tablet; Take 1 tablet by mouth every 4 hours as needed for Headaches or Migraine, Disp-60 tablet, R-3Normal  2. Acute non-recurrent sinusitis, unspecified location  -     azithromycin (ZITHROMAX) 250 MG tablet; Take 1 tablet by mouth See Admin Instructions for 5 days 500mg on day 1 followed by 250mg on days 2 - 5, Disp-6 tablet, R-0Normal  3. Generalized anxiety disorder  -     LORazepam (ATIVAN) 0.5 MG tablet; Take 1 tablet by mouth 2 times daily as needed for Anxiety for up to 30 days. , Disp-30 tablet, R-2Normal  -     CBC Auto Differential; Future  -     Comprehensive Metabolic Panel; Future  4. Encounter for hepatitis C screening test for low risk patient  -     Hepatitis C Antibody; Future  5. Screening for HIV (human immunodeficiency virus)  -     HIV Screen; Future  6. Screening for lipoid disorders  -     Lipid Panel; Future  refills given. Cont meds  zpak for sinusitis  Ativan for anxiety prn  Follow up in 6 months or prn    No follow-ups on file. SUBJECTIVE/OBJECTIVE:  Presents for 6-month follow-up of chronic conditions including hypertension, migraine headaches, anxiety. She states in general she has been feeling well. Has had more headaches recently, but the Fioricet does help. Blood pressures been controlled. She denies any chest pain or shortness of breath.   Does states she is had some sinus congestion postnasal drip but denies any fever. States this is been worse for the past few days. Feels like she has a sinus infection. Next, patient does have some anxiety issues but states that taking Ativan occasionally does help with this. She does request a refill. Tolerates her blood pressure meds and takes them as prescribed. Is due for fasting labs which she agrees to have done. Also has hypothyroidism and follows up with endocrine for this and has appointment later this month. Review of Systems   Constitutional: Negative for activity change, appetite change, chills, fatigue and fever. HENT: Negative for congestion, rhinorrhea and sore throat. Respiratory: Negative for cough, shortness of breath and wheezing. Cardiovascular: Negative for chest pain and palpitations. Gastrointestinal: Negative for abdominal pain, constipation, diarrhea and nausea. Genitourinary: Negative for dysuria and hematuria. Musculoskeletal: Negative for arthralgias and myalgias. Neurological: Positive for headaches. Negative for dizziness. Psychiatric/Behavioral: Negative for dysphoric mood and sleep disturbance. The patient is nervous/anxious. Physical Exam  Vitals and nursing note reviewed. Constitutional:       General: She is not in acute distress. Appearance: She is well-developed. HENT:      Head: Normocephalic and atraumatic. Right Ear: Hearing, tympanic membrane, ear canal and external ear normal.      Left Ear: Hearing, tympanic membrane, ear canal and external ear normal.      Nose:      Right Sinus: Maxillary sinus tenderness present. Left Sinus: Maxillary sinus tenderness present. Mouth/Throat:      Pharynx: No oropharyngeal exudate or posterior oropharyngeal erythema. Eyes:      General: No scleral icterus. Right eye: No discharge. Left eye: No discharge.       Conjunctiva/sclera: Conjunctivae normal.      Pupils: Pupils are equal, round, and reactive to light. Cardiovascular:      Rate and Rhythm: Normal rate and regular rhythm. Heart sounds: Normal heart sounds. Pulmonary:      Effort: Pulmonary effort is normal. No respiratory distress. Breath sounds: Normal breath sounds. No wheezing. Abdominal:      General: Bowel sounds are normal. There is no distension. Palpations: Abdomen is soft. Tenderness: There is no abdominal tenderness. Musculoskeletal:         General: No tenderness. Normal range of motion. Cervical back: Normal range of motion and neck supple. Lymphadenopathy:      Cervical: Cervical adenopathy present. Skin:     General: Skin is warm and dry. Findings: No rash. Neurological:      Mental Status: She is alert and oriented to person, place, and time. Motor: No abnormal muscle tone. Coordination: Coordination normal.   Psychiatric:         Behavior: Behavior normal.         Thought Content: Thought content normal.         Judgment: Judgment normal.         Vitals:    10/13/21 1530   BP: 130/84   Pulse: 66   Resp: 18   Temp: 97.6 °F (36.4 °C)   SpO2: 98%              An electronic signature was used to authenticate this note.     --Thanh Snowden MD

## 2021-10-25 ENCOUNTER — HOSPITAL ENCOUNTER (OUTPATIENT)
Age: 46
Discharge: HOME | End: 2021-10-25
Payer: COMMERCIAL

## 2021-10-25 PROCEDURE — 36415 COLL VENOUS BLD VENIPUNCTURE: CPT

## 2021-10-25 PROCEDURE — 84436 ASSAY OF TOTAL THYROXINE: CPT

## 2021-10-25 PROCEDURE — 84443 ASSAY THYROID STIM HORMONE: CPT

## 2021-10-26 LAB — TSH SERPL DL<=0.05 MIU/L-ACNC: 9.58 UIU/ML (ref 0.4–4.2)

## 2021-10-27 LAB — THYROXINE (T4): 7.4 UG/DL (ref 4.5–10.9)

## 2021-12-16 ENCOUNTER — PATIENT MESSAGE (OUTPATIENT)
Dept: FAMILY MEDICINE CLINIC | Age: 46
End: 2021-12-16

## 2021-12-16 NOTE — TELEPHONE ENCOUNTER
From: Estella Malloy  To: Dr. Michele Beltran  Sent: 12/16/2021 10:56 AM EST  Subject: Anxious    Hi Dr Keith Vides,  I am currently on Lexapro 20mg and Ativan 1mg as needed. Well, I do not feel like my meds are working. It could be completely situational, but I feel like I might need something else. My  went to work on December 3rd and had a heart attack. After 2 caths, he has 4 stents he is back home. I am so worried about him, and worry about the what if's. I am having issues sleeping, and at times catch my self having palpitations. I am thinking it is from lack of sleep, increase in work at home, stress, kids, etc.. I am not having suicidal/homicidal thoughts, I think I am just stressed. Any advice is appreciated. Thanks!!   Estella Malloy [phone]

## 2021-12-28 RX ORDER — DOXYCYCLINE HYCLATE 100 MG
100 TABLET ORAL 2 TIMES DAILY
Qty: 20 TABLET | Refills: 0 | Status: SHIPPED | OUTPATIENT
Start: 2021-12-28 | End: 2022-01-07

## 2021-12-28 RX ORDER — BUSPIRONE HYDROCHLORIDE 7.5 MG/1
7.5 TABLET ORAL 2 TIMES DAILY
Qty: 60 TABLET | Refills: 1 | Status: SHIPPED | OUTPATIENT
Start: 2021-12-28 | End: 2022-03-15

## 2022-01-10 ENCOUNTER — APPOINTMENT (OUTPATIENT)
Dept: CT IMAGING | Age: 47
End: 2022-01-10
Payer: COMMERCIAL

## 2022-01-10 ENCOUNTER — TELEPHONE (OUTPATIENT)
Dept: FAMILY MEDICINE CLINIC | Age: 47
End: 2022-01-10

## 2022-01-10 ENCOUNTER — HOSPITAL ENCOUNTER (EMERGENCY)
Age: 47
Discharge: HOME | End: 2022-01-10
Attending: FAMILY MEDICINE
Payer: COMMERCIAL

## 2022-01-10 ENCOUNTER — PATIENT MESSAGE (OUTPATIENT)
Dept: FAMILY MEDICINE CLINIC | Age: 47
End: 2022-01-10

## 2022-01-10 VITALS
SYSTOLIC BLOOD PRESSURE: 149 MMHG | HEART RATE: 95 BPM | TEMPERATURE: 98.1 F | OXYGEN SATURATION: 98 % | DIASTOLIC BLOOD PRESSURE: 87 MMHG | RESPIRATION RATE: 20 BRPM

## 2022-01-10 DIAGNOSIS — M54.9 MID BACK PAIN: Primary | ICD-10-CM

## 2022-01-10 LAB
ALBUMIN SERPL-MCNC: 3.4 GM/DL (ref 3.4–5)
ALP BLD-CCNC: 66 U/L (ref 46–116)
ALT SERPL-CCNC: 19 U/L (ref 14–63)
ANION GAP: 8 MEQ/L (ref 8–16)
AST SERPL-CCNC: 16 U/L (ref 15–37)
BASOPHILS # BLD: 0.5 % (ref 0–3)
BILIRUB SERPL-MCNC: 0.3 MG/DL (ref 0.2–1)
BUN BLDV-MCNC: 13 MG/DL (ref 7–18)
CHLORIDE BLD-SCNC: 104 MEQ/L (ref 98–107)
CO2: 29 MEQ/L (ref 21–32)
CREAT SERPL-MCNC: 0.8 MG/DL (ref 0.6–1.3)
EOSINOPHILS RELATIVE PERCENT: 1.9 % (ref 0–4)
GFR, ESTIMATED: 82 ML/MIN/1.73M2
GLUCOSE BLD-MCNC: 104 MG/DL (ref 74–106)
HCT VFR BLD CALC: 40.5 % (ref 37–47)
HEMOGLOBIN: 13 GM/DL (ref 12–16)
LIPASE: 93 U/L (ref 73–393)
LYMPHOCYTES # BLD: 34 % (ref 15–47)
MCH RBC QN AUTO: 31 PG (ref 27–31)
MCHC RBC AUTO-ENTMCNC: 32.1 GM/DL (ref 33–37)
MCV RBC AUTO: 96.6 FL (ref 81–99)
MONOCYTES: 8.1 % (ref 0–12)
PDW BLD-RTO: 12.8 % (ref 11.5–14.5)
PLATELET # BLD: 223 THOU/MM3 (ref 130–400)
PMV BLD AUTO: 7.6 FL (ref 7.4–10.4)
POC CALCIUM: 8.7 MG/DL (ref 8.5–10.1)
POTASSIUM SERPL-SCNC: 3.6 MEQ/L (ref 3.5–5.1)
RBC # BLD: 4.19 MILL/MM3 (ref 4.2–5.4)
SEGS: 55.5 % (ref 43–75)
SODIUM BLD-SCNC: 141 MEQ/L (ref 136–145)
TOTAL PROTEIN: 6.6 GM/DL (ref 6.4–8.2)
TROPONIN I: < 0.017 NG/ML (ref 0.02–0.06)
WBC # BLD: 8.9 THOU/MM3 (ref 4.8–10.8)

## 2022-01-10 PROCEDURE — 83690 ASSAY OF LIPASE: CPT

## 2022-01-10 PROCEDURE — 93005 ELECTROCARDIOGRAM TRACING: CPT | Performed by: FAMILY MEDICINE

## 2022-01-10 PROCEDURE — 85025 COMPLETE CBC W/AUTO DIFF WBC: CPT

## 2022-01-10 PROCEDURE — 80053 COMPREHEN METABOLIC PANEL: CPT

## 2022-01-10 PROCEDURE — 99283 EMERGENCY DEPT VISIT LOW MDM: CPT

## 2022-01-10 PROCEDURE — 84484 ASSAY OF TROPONIN QUANT: CPT

## 2022-01-10 PROCEDURE — 6360000004 HC RX CONTRAST MEDICATION: Performed by: FAMILY MEDICINE

## 2022-01-10 PROCEDURE — 71275 CT ANGIOGRAPHY CHEST: CPT

## 2022-01-10 RX ORDER — HYDROCODONE BITARTRATE AND ACETAMINOPHEN 5; 325 MG/1; MG/1
1 TABLET ORAL EVERY 6 HOURS PRN
Qty: 10 TABLET | Refills: 0 | Status: SHIPPED | OUTPATIENT
Start: 2022-01-10 | End: 2022-01-13

## 2022-01-10 RX ORDER — CYCLOBENZAPRINE HCL 10 MG
10 TABLET ORAL 2 TIMES DAILY PRN
Qty: 20 TABLET | Refills: 0 | Status: SHIPPED | OUTPATIENT
Start: 2022-01-10 | End: 2022-01-20

## 2022-01-10 RX ADMIN — IOPAMIDOL 75 ML: 755 INJECTION, SOLUTION INTRAVENOUS at 17:51

## 2022-01-10 ASSESSMENT — ENCOUNTER SYMPTOMS
VOMITING: 0
NAUSEA: 0
BACK PAIN: 1
COUGH: 0
SORE THROAT: 0
SHORTNESS OF BREATH: 0

## 2022-01-10 ASSESSMENT — PAIN DESCRIPTION - ORIENTATION: ORIENTATION: MID

## 2022-01-10 ASSESSMENT — PAIN SCALES - GENERAL: PAINLEVEL_OUTOF10: 7

## 2022-01-10 ASSESSMENT — PAIN DESCRIPTION - LOCATION: LOCATION: BACK

## 2022-01-10 NOTE — ED NOTES
Presents with sudden onset of mid back pain today. Patient feels that at some times, she needs to take a deep breath to catch her breath. Had a negative Covid test today and last week. Patient is alert and cooperative. Skin warm and dry. Color normal for ethnicity.      Tisha Love RN  01/10/22 7149

## 2022-01-10 NOTE — ED PROVIDER NOTES
[facility]  eMERGENCY dEPARTMENT eNCOUnter          CHIEF COMPLAINT       Chief Complaint   Patient presents with    Back Pain     mid       Nurses Notes reviewed and I agree except as noted in the HPI. HISTORY OF PRESENT ILLNESS    Micky Casey is a 40 y.o. female who presents with mid back pain. Patient states that the pain seems to wrap around from the sides of her chest to her mid back. She denies any chest pain. The pain she states came upon this afternoon prior to arrival.  She notes that the pain is moderate in intensity. Denies any shortness of breath. Denies any fever or chills. She did admit to taking a rapid Covid test at home which was negative. Denies any known trauma. REVIEW OF SYSTEMS     Review of Systems   Constitutional: Negative for chills and fever. HENT: Negative for congestion and sore throat. Respiratory: Negative for cough and shortness of breath. Cardiovascular: Negative for chest pain and palpitations. Gastrointestinal: Negative for nausea and vomiting. Musculoskeletal: Positive for back pain (mid scapula pain). Negative for neck pain and neck stiffness. Psychiatric/Behavioral: Negative for agitation and behavioral problems. All other systems reviewed and are negative. PAST MEDICAL HISTORY    has a past medical history of Headache(784.0), Hypertension, Infertility, Nausea & vomiting, YIFAN on CPAP, Polycystic ovaries, PONV (postoperative nausea and vomiting), PVC (premature ventricular contraction), and Thyroid disease. SURGICAL HISTORY      has a past surgical history that includes  section; cyst removal; Tooth Extraction (Right, 13); Appendectomy (); Cholecystectomy (); hysteroscopy (6/3/14); Dilation and curettage of uterus (2014); other surgical history (Right, 2015); Knee arthroscopy; Thyroidectomy (Bilateral, 2016); Sleeve Gastrectomy (10/10/2016); and Stomach surgery.     CURRENT MEDICATIONS Discharge Medication List as of 1/10/2022  6:29 PM      CONTINUE these medications which have NOT CHANGED    Details   busPIRone (BUSPAR) 7.5 MG tablet Take 1 tablet by mouth 2 times daily, Disp-60 tablet, R-1Normal      levothyroxine (SYNTHROID) 75 MCG tablet Historical Med      ondansetron (ZOFRAN ODT) 4 MG disintegrating tablet Take 1 tablet by mouth every 8 hours as needed for Nausea or Vomiting, Disp-45 tablet, R-2Normal      butalbital-acetaminophen-caffeine (FIORICET, ESGIC) -40 MG per tablet Take 1 tablet by mouth every 4 hours as needed for Headaches or Migraine, Disp-60 tablet, R-3Normal      nystatin (MYCOSTATIN) 517636 UNIT/GM powder Apply 3 times daily. , Disp-60 g, R-3, Normal      lisinopril-hydroCHLOROthiazide (PRINZIDE;ZESTORETIC) 20-25 MG per tablet Take 1 tablet by mouth daily, Disp-90 tablet, R-3Normal      escitalopram (LEXAPRO) 20 MG tablet Take 1 tablet by mouth daily, Disp-90 tablet, R-3Normal      lansoprazole (PREVACID) 30 MG delayed release capsule TAKE 1 CAPSULE BY MOUTH ONE TIME A DAY, Disp-90 capsule, R-3Normal      vitamin B-12 (CYANOCOBALAMIN) 1000 MCG tablet Take 1,000 mcg by mouth dailyHistorical Med      Cholecalciferol (VITAMIN D3) 42955 UNITS CAPS Take 1 capsule by mouth once a week, Disp-4 capsule, R-1      calcium carbonate (OSCAL) 500 MG TABS tablet Take 500 mg by mouth 3 times daily      multivitamin (THERAGRAN) per tablet Take 1 tablet by mouth daily. ALLERGIES     has No Known Allergies. FAMILY HISTORY     She indicated that her mother is alive. She indicated that her father is . She indicated that both of her brothers are alive. She indicated that her maternal grandmother is . She indicated that her maternal grandfather is . She indicated that her paternal grandmother is . She indicated that her paternal grandfather is .    family history includes Arthritis in her mother; Cancer in her brother; Heart Attack in her paternal grandfather; Hypertension in her mother; Davy Coyle in her father; Obesity in her brother and father; Other in her mother. SOCIAL HISTORY      reports that she has never smoked. She has never used smokeless tobacco. She reports current alcohol use. She reports that she does not use drugs. PHYSICAL EXAM     INITIAL VITALS:  temperature is 98.1 °F (36.7 °C). Her blood pressure is 149/87 (abnormal) and her pulse is 95. Her respiration is 20 and oxygen saturation is 98%. Physical Exam  Vitals and nursing note reviewed. Constitutional:       General: She is in acute distress. Cardiovascular:      Rate and Rhythm: Normal rate and regular rhythm. Pulses: Normal pulses. Heart sounds: Normal heart sounds. Pulmonary:      Effort: Pulmonary effort is normal.      Breath sounds: Normal breath sounds. Abdominal:      Tenderness: There is no abdominal tenderness. There is no guarding or rebound. Musculoskeletal:         General: Tenderness (mid scapula ) present. No swelling, deformity or signs of injury. Cervical back: Neck supple. Lymphadenopathy:      Cervical: No cervical adenopathy. Skin:     General: Skin is dry. Findings: No erythema or rash. Neurological:      Mental Status: She is alert. Sensory: No sensory deficit.          DIFFERENTIAL DIAGNOSIS:   PUD,esophagitis,PE,AAA,acs,    DIAGNOSTIC RESULTS     EKG: All EKG's are interpreted by the Emergency Department Physician who either signs or Co-signs this chart in the absence of a cardiologist.   Normal sinus rhythm   Normal ECG   When compared with ECG of 09-MAR-2018 13:53,   No significant change was found            RADIOLOGY: non-plain film images(s) such as CT, Ultrasound and MRI are read by the radiologist.        CTA CHEST [address] (Final result)  Result time 01/10/22 18:08:09  Procedure changed from [address]  Final result by Jose Raul Haas MD (01/10/22 18:08:09) Impression:      1. No evidence of pulmonary embolism. 2. Mild cardiomegaly. 3. Evidence for granulomatous disease in the superior segment of the right lower lobe, in the pretracheal space and in the spleen. 4. Status post thyroidectomy, cholecystectomy and gastric bypass surgery. 5. Thoracic spondylosis. 6. Left adrenal nodule, unchanged since previous study dated 8 February 2016. **This report has been created using voice recognition software. It may contain minor errors which are inherent in voice recognition technology. **     Final report electronically signed by DR Nicki Stoll on 1/10/2022 6:08 PM            Narrative:    PROCEDURE: CTA CHEST W WO CONTRAST     CLINICAL INFORMATION: pain wraps around chest to mid back. COMPARISON: CTA chest dated 8 February 2016. TECHNIQUE: 3 mm axial images were obtained through the chest after the administration of IV contrast.  A non-contrast localizer was obtained.  3D reconstructions were performed on the scanner to include MIP coronal and sagittal images through the chest.   Isovue was the intravenous contrast utilized. All CT scans at this facility use dose modulation, iterative reconstruction, and/or weight-based dosing when appropriate to reduce radiation dose to as low as reasonably achievable. FINDINGS:       There is adequate opacification of the pulmonary arterial system. No pulmonary emboli are present.  The aorta is within acceptable limits. There is mild cardiomegaly. There is no pericardial or pleural effusion.  There are lymph nodes in the pretracheal space suggestive of old granulomatous disease. This a punctate calcified granuloma in the superior segment of the right lower lobe. There are no infiltrates.  The patient is status post thyroidectomy. There is thoracic spondylosis. The patient is status post gastric bypass surgery and   cholecystectomy.  There are punctate calcifications in the spleen consistent with old granulomatous disease. Joleen Hernandez is a 1.9 cm left adrenal nodule, unchanged since previous study dated 8 February 2016. LABS:   Labs Reviewed   CBC WITH AUTO DIFFERENTIAL - Abnormal; Notable for the following components:       Result Value    RBC 4.19 (*)     MCHC 32.1 (*)     All other components within normal limits   GLOMERULAR FILTRATION RATE, ESTIMATED - Abnormal; Notable for the following components:    GFR, Estimated 82 (*)     All other components within normal limits   COMPREHENSIVE METABOLIC PANEL   TROPONIN   ANION GAP   LIPASE       EMERGENCY DEPARTMENT COURSE:   Vitals:    Vitals:    01/10/22 1708   BP: (!) 149/87   Pulse: 95   Resp: 20   Temp: 98.1 °F (36.7 °C)   SpO2: 98%     Patient seems to demonstrate pain mostly in the mid scapular area. Her lungs are clear. Neck is supple no JVD. EKG at the bedside shows a normal sinus rhythm. No appreciable epigastric pain. Based on her acute presentation my concern for AAA, PE were high I ended up doing a CT of the chest PE protocol which was negative for PE. No great vessel disease was noted. Lymph nodes in the paratracheal space suggestive of old granulomatous disease. No evidence of bacterial disease at this time. Troponin was negative. She denies any known trauma. At this time I will provide her analgesia for pain. Muscle relaxants for the surrounding area. I recommended further follow-up with her PCP. PROCEDURES:  None    FINAL IMPRESSION      1. Mid back pain          DISPOSITION/PLAN   Home. Care instructions provided. Follow-up with PCP or ED if new issues arise. PATIENT REFERRED TO:  No follow-up provider specified. DISCHARGE MEDICATIONS:  Discharge Medication List as of 1/10/2022  6:29 PM      START taking these medications    Details   HYDROcodone-acetaminophen (NORCO) 5-325 MG per tablet Take 1 tablet by mouth every 6 hours as needed for Pain for up to 3 days. Intended supply: 3 days.  Take lowest dose possible to manage pain, Disp-10 tablet, R-0Print      cyclobenzaprine (FLEXERIL) 10 MG tablet Take 1 tablet by mouth 2 times daily as needed for Muscle spasms, Disp-20 tablet, R-0Normal             (Please note that portions of this note were completed with a voice recognition program.  Efforts were made to edit the dictations but occasionally words are mis-transcribed.)    MD Vicki Briseno MD  01/10/22 0911

## 2022-01-10 NOTE — TELEPHONE ENCOUNTER
FYI: Spoke to the pt after discussing with Dr Queenie Isaac, who stated that the pt needs to be evaluated due to her symptoms that she is having. Pt can come in here or go to a walk-in clinic or urgent care to be evalutaed. Pt stated that she will go to be evaluated.

## 2022-01-10 NOTE — ED NOTES
Discharge instructions reviewed with patient and questions answered. Skin warm and dry, color normal for ethnicity. Remains alert and cooperative. Denies further questions or concerns at this time.      Mendel Topete RN  01/10/22 6381

## 2022-01-10 NOTE — TELEPHONE ENCOUNTER
Pt called stating that today she had a lot of SOB and felt that her chest was constricted and mid back pain in the center of her back. Pt stated her oxygen level is 97% and HR is in the 70's. Pt wants to have have an EKG ordered.

## 2022-01-11 ENCOUNTER — TELEPHONE (OUTPATIENT)
Dept: FAMILY MEDICINE CLINIC | Age: 47
End: 2022-01-11

## 2022-01-11 LAB
EKG ATRIAL RATE: 74 BPM
EKG P AXIS: 61 DEGREES
EKG P-R INTERVAL: 166 MS
EKG Q-T INTERVAL: 392 MS
EKG QRS DURATION: 92 MS
EKG QTC CALCULATION (BAZETT): 435 MS
EKG R AXIS: 12 DEGREES
EKG T AXIS: 49 DEGREES
EKG VENTRICULAR RATE: 74 BPM

## 2022-01-11 NOTE — TELEPHONE ENCOUNTER
From: Dilan Michele  To: Dr. Hernandez Cough: 1/10/2022 7:26 PM EST  Subject: CTA results    Hi Dr Ashleigh Harkins   I went to [facility] with my mid upper back pain. As you can see, no PE, however I am concerned with the granuloma spots on my lung and spleen. Do I need additional testing? Is this something I need to be concerned about?   Thank you,  Gavin Quincy

## 2022-03-08 ENCOUNTER — HOSPITAL ENCOUNTER (OUTPATIENT)
Dept: ULTRASOUND IMAGING | Age: 47
Discharge: HOME | End: 2022-03-08
Payer: COMMERCIAL

## 2022-03-08 ENCOUNTER — OFFICE VISIT (OUTPATIENT)
Dept: FAMILY MEDICINE CLINIC | Age: 47
End: 2022-03-08
Payer: COMMERCIAL

## 2022-03-08 ENCOUNTER — HOSPITAL ENCOUNTER (OUTPATIENT)
Age: 47
Discharge: HOME | End: 2022-03-08
Payer: COMMERCIAL

## 2022-03-08 VITALS
SYSTOLIC BLOOD PRESSURE: 130 MMHG | BODY MASS INDEX: 52.31 KG/M2 | RESPIRATION RATE: 18 BRPM | OXYGEN SATURATION: 95 % | HEART RATE: 86 BPM | TEMPERATURE: 98.4 F | DIASTOLIC BLOOD PRESSURE: 86 MMHG | WEIGHT: 293 LBS

## 2022-03-08 DIAGNOSIS — R22.1 LUMP ON NECK: ICD-10-CM

## 2022-03-08 DIAGNOSIS — R22.1 LUMP ON NECK: Primary | ICD-10-CM

## 2022-03-08 LAB
BASOPHILS # BLD: 0.5 %
BASOPHILS ABSOLUTE: 0 THOU/MM3 (ref 0–0.1)
EOSINOPHIL # BLD: 2.9 %
EOSINOPHILS ABSOLUTE: 0.2 THOU/MM3 (ref 0–0.4)
ERYTHROCYTE [DISTWIDTH] IN BLOOD BY AUTOMATED COUNT: 12.8 % (ref 11.5–14.5)
ERYTHROCYTE [DISTWIDTH] IN BLOOD BY AUTOMATED COUNT: 43.8 FL (ref 35–45)
HCT VFR BLD CALC: 41.4 % (ref 37–47)
HEMOGLOBIN: 13.3 GM/DL (ref 12–16)
IMMATURE GRANS (ABS): 0.02 THOU/MM3 (ref 0–0.07)
IMMATURE GRANULOCYTES: 0.2 %
LYMPHOCYTES # BLD: 30.3 %
LYMPHOCYTES ABSOLUTE: 2.5 THOU/MM3 (ref 1–4.8)
MCH RBC QN AUTO: 30 PG (ref 26–33)
MCHC RBC AUTO-ENTMCNC: 32.1 GM/DL (ref 32.2–35.5)
MCV RBC AUTO: 93.2 FL (ref 81–99)
MONOCYTES # BLD: 6.9 %
MONOCYTES ABSOLUTE: 0.6 THOU/MM3 (ref 0.4–1.3)
NUCLEATED RED BLOOD CELLS: 0 /100 WBC
PLATELET # BLD: 257 THOU/MM3 (ref 130–400)
PMV BLD AUTO: 10 FL (ref 9.4–12.4)
RBC # BLD: 4.44 MILL/MM3 (ref 4.2–5.4)
SEG NEUTROPHILS: 59.2 %
SEGMENTED NEUTROPHILS ABSOLUTE COUNT: 5 THOU/MM3 (ref 1.8–7.7)
WBC # BLD: 8.4 THOU/MM3 (ref 4.8–10.8)

## 2022-03-08 PROCEDURE — G8427 DOCREV CUR MEDS BY ELIG CLIN: HCPCS | Performed by: NURSE PRACTITIONER

## 2022-03-08 PROCEDURE — 36415 COLL VENOUS BLD VENIPUNCTURE: CPT

## 2022-03-08 PROCEDURE — 99213 OFFICE O/P EST LOW 20 MIN: CPT | Performed by: NURSE PRACTITIONER

## 2022-03-08 PROCEDURE — 85025 COMPLETE CBC W/AUTO DIFF WBC: CPT

## 2022-03-08 PROCEDURE — 1036F TOBACCO NON-USER: CPT | Performed by: NURSE PRACTITIONER

## 2022-03-08 PROCEDURE — G8417 CALC BMI ABV UP PARAM F/U: HCPCS | Performed by: NURSE PRACTITIONER

## 2022-03-08 PROCEDURE — 76536 US EXAM OF HEAD AND NECK: CPT

## 2022-03-08 PROCEDURE — G8484 FLU IMMUNIZE NO ADMIN: HCPCS | Performed by: NURSE PRACTITIONER

## 2022-03-08 RX ORDER — LORAZEPAM 1 MG/1
1 TABLET ORAL EVERY 6 HOURS PRN
COMMUNITY
End: 2022-03-10 | Stop reason: SDUPTHER

## 2022-03-08 RX ORDER — CEPHALEXIN 500 MG/1
500 CAPSULE ORAL 3 TIMES DAILY
Qty: 30 CAPSULE | Refills: 0 | Status: SHIPPED | OUTPATIENT
Start: 2022-03-08 | End: 2022-03-18

## 2022-03-08 SDOH — ECONOMIC STABILITY: FOOD INSECURITY: WITHIN THE PAST 12 MONTHS, YOU WORRIED THAT YOUR FOOD WOULD RUN OUT BEFORE YOU GOT MONEY TO BUY MORE.: NEVER TRUE

## 2022-03-08 SDOH — ECONOMIC STABILITY: FOOD INSECURITY: WITHIN THE PAST 12 MONTHS, THE FOOD YOU BOUGHT JUST DIDN'T LAST AND YOU DIDN'T HAVE MONEY TO GET MORE.: NEVER TRUE

## 2022-03-08 SDOH — ECONOMIC STABILITY: TRANSPORTATION INSECURITY
IN THE PAST 12 MONTHS, HAS LACK OF TRANSPORTATION KEPT YOU FROM MEETINGS, WORK, OR FROM GETTING THINGS NEEDED FOR DAILY LIVING?: NO

## 2022-03-08 SDOH — ECONOMIC STABILITY: TRANSPORTATION INSECURITY
IN THE PAST 12 MONTHS, HAS THE LACK OF TRANSPORTATION KEPT YOU FROM MEDICAL APPOINTMENTS OR FROM GETTING MEDICATIONS?: NO

## 2022-03-08 ASSESSMENT — ENCOUNTER SYMPTOMS
NAUSEA: 0
VOMITING: 0
SORE THROAT: 0
DIARRHEA: 0
BACK PAIN: 0
WHEEZING: 0
CHEST TIGHTNESS: 0
SINUS PRESSURE: 0
ABDOMINAL PAIN: 0
ABDOMINAL DISTENTION: 0
SHORTNESS OF BREATH: 0
COLOR CHANGE: 0
CONSTIPATION: 0
COUGH: 0
STRIDOR: 0

## 2022-03-08 ASSESSMENT — PATIENT HEALTH QUESTIONNAIRE - PHQ9
SUM OF ALL RESPONSES TO PHQ QUESTIONS 1-9: 1
SUM OF ALL RESPONSES TO PHQ QUESTIONS 1-9: 1
2. FEELING DOWN, DEPRESSED OR HOPELESS: 0
SUM OF ALL RESPONSES TO PHQ9 QUESTIONS 1 & 2: 1
1. LITTLE INTEREST OR PLEASURE IN DOING THINGS: 1
SUM OF ALL RESPONSES TO PHQ QUESTIONS 1-9: 1
SUM OF ALL RESPONSES TO PHQ QUESTIONS 1-9: 1

## 2022-03-08 ASSESSMENT — SOCIAL DETERMINANTS OF HEALTH (SDOH): HOW HARD IS IT FOR YOU TO PAY FOR THE VERY BASICS LIKE FOOD, HOUSING, MEDICAL CARE, AND HEATING?: NOT HARD AT ALL

## 2022-03-08 NOTE — PROGRESS NOTES
Gus Egan (:  1975) is a 56 y.o. female,Established patient, here for evaluation of the following chief complaint(s):  Headache (x 2 weeks ago may need meds adjusted ) and Mass (around left SC joint up into neck area)         ASSESSMENT/PLAN:  1. Lump on neck  -     US HEAD NECK SOFT TISSUE THYROID; Future  -     CBC with Auto Differential; Future    Obtain US  Obtain cbc    Return if symptoms worsen or fail to improve. Subjective   SUBJECTIVE/OBJECTIVE:  HPI    Lump on anterior neck. Lump is located where her thyroidectomy was done. Noticed yesterday. Very painful to touch the area. No redness or wounds. Spreading up neck and shoulder. Denies fevers. Seeing dr. Vipul Leong for her replacement therapy. Has been a little office recently. Review of Systems   Constitutional: Negative for activity change, appetite change, chills, diaphoresis, fatigue, fever and unexpected weight change. HENT: Negative for congestion, ear pain, postnasal drip, sinus pressure and sore throat. Painful lump under her thyroidectomy scar   Eyes: Negative for visual disturbance. Respiratory: Negative for cough, chest tightness, shortness of breath, wheezing and stridor. Cardiovascular: Negative for chest pain, palpitations and leg swelling. Gastrointestinal: Negative for abdominal distention, abdominal pain, constipation, diarrhea, nausea and vomiting. Endocrine: Negative for polydipsia, polyphagia and polyuria. Genitourinary: Negative for decreased urine volume, difficulty urinating, dysuria, flank pain, frequency, hematuria and urgency. Musculoskeletal: Negative for arthralgias, back pain, gait problem, joint swelling, myalgias and neck pain. Skin: Negative for color change, pallor and rash. Neurological: Negative for dizziness, syncope, weakness, light-headedness, numbness and headaches. Hematological: Negative for adenopathy.    Psychiatric/Behavioral: Negative for behavioral problems, self-injury and sleep disturbance. The patient is not nervous/anxious. Objective   Physical Exam  Constitutional:       Appearance: Normal appearance. HENT:      Head: Normocephalic and atraumatic. Nose: Nose normal.   Cardiovascular:      Rate and Rhythm: Normal rate. Pulmonary:      Effort: Pulmonary effort is normal.   Skin:     General: Skin is warm and dry. Neurological:      Mental Status: She is alert and oriented to person, place, and time. On this date 3/8/2022 I have spent 25 minutes reviewing previous notes, test results and face to face with the patient discussing the diagnosis and importance of compliance with the treatment plan as well as documenting on the day of the visit. An electronic signature was used to authenticate this note.     --NICO Barr - CNP

## 2022-03-09 ENCOUNTER — HOSPITAL ENCOUNTER (EMERGENCY)
Age: 47
Discharge: HOME | End: 2022-03-09
Attending: EMERGENCY MEDICINE
Payer: COMMERCIAL

## 2022-03-09 ENCOUNTER — APPOINTMENT (OUTPATIENT)
Dept: CT IMAGING | Age: 47
End: 2022-03-09
Payer: COMMERCIAL

## 2022-03-09 VITALS
HEART RATE: 66 BPM | SYSTOLIC BLOOD PRESSURE: 140 MMHG | RESPIRATION RATE: 16 BRPM | TEMPERATURE: 97.7 F | DIASTOLIC BLOOD PRESSURE: 66 MMHG | OXYGEN SATURATION: 95 %

## 2022-03-09 DIAGNOSIS — M79.89 MASS OF SOFT TISSUE OF NECK: ICD-10-CM

## 2022-03-09 DIAGNOSIS — R06.01 ORTHOPNEA: Primary | ICD-10-CM

## 2022-03-09 PROCEDURE — 70491 CT SOFT TISSUE NECK W/DYE: CPT

## 2022-03-09 PROCEDURE — 6360000004 HC RX CONTRAST MEDICATION: Performed by: EMERGENCY MEDICINE

## 2022-03-09 PROCEDURE — 99284 EMERGENCY DEPT VISIT MOD MDM: CPT

## 2022-03-09 RX ORDER — LANSOPRAZOLE 30 MG/1
30 CAPSULE, DELAYED RELEASE ORAL DAILY
Qty: 30 CAPSULE | Refills: 0 | Status: SHIPPED | OUTPATIENT
Start: 2022-03-09

## 2022-03-09 RX ADMIN — IOPAMIDOL 100 ML: 755 INJECTION, SOLUTION INTRAVENOUS at 16:38

## 2022-03-09 ASSESSMENT — ENCOUNTER SYMPTOMS
ABDOMINAL PAIN: 0
VOMITING: 0
COUGH: 0
SHORTNESS OF BREATH: 1
NAUSEA: 0
WHEEZING: 0

## 2022-03-09 ASSESSMENT — PAIN - FUNCTIONAL ASSESSMENT: PAIN_FUNCTIONAL_ASSESSMENT: 0-10

## 2022-03-09 ASSESSMENT — PAIN DESCRIPTION - ORIENTATION: ORIENTATION: LEFT

## 2022-03-09 ASSESSMENT — PAIN DESCRIPTION - LOCATION: LOCATION: NECK;HEAD;SHOULDER

## 2022-03-09 ASSESSMENT — PAIN SCALES - GENERAL: PAINLEVEL_OUTOF10: 5

## 2022-03-09 NOTE — ED NOTES
AVS rev'd with pt. And voices understanding of poc, copy given. Pulse regular. Extremities warm. Respirations regular and quiet. Mucous membranes pink & moist. Alert and oriented times 3. No nausea or vomiting. Range of motion within patient's limits. Skin pink, warm and dry. Calm and cooperative.      Blessing Sanderson RN  03/09/22 3879

## 2022-03-09 NOTE — ED NOTES
Pt. Presents ambulatory to ED with c/o lump to left side of neck, pt. Had u/s yesterday outpt.      Paulette Espinoza RN  03/09/22 1023 [facility] Heidi Morrow RN  03/09/22 0148

## 2022-03-09 NOTE — ED PROVIDER NOTES
[facility]  eMERGENCY dEPARTMENT eNCOUnter             [address]    CHIEF COMPLAINT    Chief Complaint   Patient presents with   Ren Pearl       Nurses Notes reviewed and I agree except as noted in the HPI. HPI    Nick Pollard is a 56 y.o. female who presents stating that she has had a swollen, tender area at the left sterno-clavicular joint area for several days, and feels like the area is bigger. She had U/S done that showed nonspecific findings. Her doctor arranged for ENT evaluation later this month. Last night she felt like she couldn't lie down flat because of a sense of pressure coming up into her neck that made her short of breath. She has had previous total thyroidectomy due to Hashimoto's thyroiditis, and is followed by an endocrinologist. She has also had previous gastric sleeve procedure with some reflux. She takes Pepcid for that. Current pain is 5/10,aching, left side of neck, head, shoulder. Her voice has been a little hoarse for a while. REVIEW OF SYSTEMS      Review of Systems   Constitutional: Negative for fever. HENT: Negative for congestion. Respiratory: Positive for shortness of breath (only if she lies down flat). Negative for cough and wheezing. Cardiovascular: Negative for chest pain and palpitations. Gastrointestinal: Negative for abdominal pain, nausea and vomiting. Musculoskeletal: Positive for neck pain. Neurological: Negative for dizziness, sensory change and focal weakness. All other systems reviewed and are negative. PAST MEDICAL HISTORY     has a past medical history of Headache(784.0), Hypertension, Infertility, Nausea & vomiting, YIFAN on CPAP, Polycystic ovaries, PONV (postoperative nausea and vomiting), PVC (premature ventricular contraction), and Thyroid disease. SURGICAL HISTORY     has a past surgical history that includes  section; cyst removal; Tooth Extraction (Right, 13);  Appendectomy (); Cholecystectomy (); hysteroscopy (6/3/14); Dilation and curettage of uterus (2014); other surgical history (Right, 2015); Knee arthroscopy; Thyroidectomy (Bilateral, 2016); Sleeve Gastrectomy (10/10/2016); and Stomach surgery. CURRENT MEDICATIONS    Discharge Medication List as of 3/9/2022  5:11 PM      CONTINUE these medications which have NOT CHANGED    Details   LORazepam (ATIVAN) 1 MG tablet Take 1 mg by mouth every 6 hours as needed for Anxiety. Historical Med      cephALEXin (KEFLEX) 500 MG capsule Take 1 capsule by mouth 3 times daily for 10 days, Disp-30 capsule, R-0Normal      levothyroxine (SYNTHROID) 75 MCG tablet 4 tablets dailyHistorical Med      ondansetron (ZOFRAN ODT) 4 MG disintegrating tablet Take 1 tablet by mouth every 8 hours as needed for Nausea or Vomiting, Disp-45 tablet, R-2Normal      butalbital-acetaminophen-caffeine (FIORICET, ESGIC) -40 MG per tablet Take 1 tablet by mouth every 4 hours as needed for Headaches or Migraine, Disp-60 tablet, R-3Normal      nystatin (MYCOSTATIN) 540214 UNIT/GM powder Apply 3 times daily. , Disp-60 g, R-3, Normal      lisinopril-hydroCHLOROthiazide (PRINZIDE;ZESTORETIC) 20-25 MG per tablet Take 1 tablet by mouth daily, Disp-90 tablet, R-3Normal      escitalopram (LEXAPRO) 20 MG tablet Take 1 tablet by mouth daily, Disp-90 tablet, R-3Normal      vitamin B-12 (CYANOCOBALAMIN) 1000 MCG tablet Take 1,000 mcg by mouth dailyHistorical Med      Cholecalciferol (VITAMIN D3) 71957 UNITS CAPS Take 1 capsule by mouth once a week, Disp-4 capsule, R-1      calcium carbonate (OSCAL) 500 MG TABS tablet Take 500 mg by mouth 3 times daily      multivitamin (THERAGRAN) per tablet Take 1 tablet by mouth daily. ALLERGIES    has No Known Allergies. FAMILY HISTORY    She indicated that her mother is alive. She indicated that her father is . She indicated that both of her brothers are alive.  She indicated that her maternal grandmother is . She indicated that her maternal grandfather is . She indicated that her paternal grandmother is . She indicated that her paternal grandfather is . family history includes Arthritis in her mother; Cancer in her brother; Heart Attack in her paternal grandfather; Hypertension in her mother; Anahi Fill in her father; Obesity in her brother and father; Other in her mother. SOCIAL HISTORY     reports that she has never smoked. She has never used smokeless tobacco. She reports current alcohol use. She reports that she does not use drugs. PHYSICAL EXAM       INITIAL VITALS: BP (!) 140/66   Pulse 66   Temp 97.7 °F (36.5 °C) (Temporal)   Resp 16   SpO2 95%        Physical Exam  Vitals and nursing note reviewed. Exam conducted with a chaperone present. Constitutional:       Appearance: She is not toxic-appearing. HENT:      Nose: Nose normal. No rhinorrhea. Mouth/Throat:      Mouth: Mucous membranes are moist.      Pharynx: No oropharyngeal exudate or posterior oropharyngeal erythema. Eyes:      Conjunctiva/sclera: Conjunctivae normal.      Pupils: Pupils are equal, round, and reactive to light. Neck:      Comments: Tender, firm nodular area left sternal clavicular joint area, no erythema, but tender to palpate. Cardiovascular:      Rate and Rhythm: Normal rate and regular rhythm. Heart sounds: No murmur heard. Pulmonary:      Effort: Pulmonary effort is normal. No respiratory distress. Breath sounds: Normal breath sounds. No wheezing. Abdominal:      Palpations: Abdomen is soft. Tenderness: There is no abdominal tenderness. Musculoskeletal:         General: No swelling or tenderness. Cervical back: Neck supple. Lymphadenopathy:      Cervical: No cervical adenopathy. Skin:     General: Skin is warm and dry. Neurological:      General: No focal deficit present.       Mental Status: She is alert and oriented to person, place, and time. Psychiatric:         Behavior: Behavior normal.           RADIOLOGY:    CT SOFT TISSUE NECK W CONTRAST   Final Result       1. No mass or adenopathy identified. 2. Apparent thickening of the epidermis at the anterior aspect of the neck inferiorly at the level of the BB marker. 3. Prominent right antral sinusitis. **This report has been created using voice recognition software. It may contain minor errors which are inherent in voice recognition technology. **      Final report electronically signed by Dr. Leonie Celaya MD on 3/9/2022 4:49 PM          Vitals:    Vitals:    03/09/22 1541 03/09/22 1543   BP:  (!) 140/66   Pulse: 66    Resp: 16    Temp: 97.7 °F (36.5 °C)    TempSrc: Temporal    SpO2: 95%        EMERGENCY DEPARTMENT COURSE:    She was notified of CT results. She is already on Keflex prescribed by her doctor. That should be adequate for the right maxillary sinusitis. She will continue current care and keep her arranged ENT appointment. She may benefit from laryngoscopy and/or EGD. I have recommended Prevacid vs Pepcid for reflux. FINAL IMPRESSION      1. Orthopnea    2.  Mass of soft tissue of neck        DISPOSITION/PLAN    DISPOSITION Decision To Discharge 03/09/2022 05:07:39 PM      PATIENT REFERRED TO:    Wilfrid Jain MD  [address] [place] [facility] [id number] [phone]      As needed      DISCHARGE MEDICATIONS:    Discharge Medication List as of 3/9/2022  5:11 PM             (Please note that portions of this note were completed with a voice recognition program.  Efforts were made to edit the dictations but occasionally words are mis-transcribed.)      Stuart Zayas MD  03/09/22 5941

## 2022-03-10 ENCOUNTER — OFFICE VISIT (OUTPATIENT)
Dept: FAMILY MEDICINE CLINIC | Age: 47
End: 2022-03-10
Payer: COMMERCIAL

## 2022-03-10 VITALS
TEMPERATURE: 97.9 F | RESPIRATION RATE: 18 BRPM | DIASTOLIC BLOOD PRESSURE: 74 MMHG | OXYGEN SATURATION: 97 % | HEART RATE: 64 BPM | SYSTOLIC BLOOD PRESSURE: 124 MMHG

## 2022-03-10 DIAGNOSIS — R22.1 LUMP ON NECK: Primary | ICD-10-CM

## 2022-03-10 DIAGNOSIS — F41.1 GENERALIZED ANXIETY DISORDER: ICD-10-CM

## 2022-03-10 PROCEDURE — G8427 DOCREV CUR MEDS BY ELIG CLIN: HCPCS | Performed by: FAMILY MEDICINE

## 2022-03-10 PROCEDURE — 1036F TOBACCO NON-USER: CPT | Performed by: FAMILY MEDICINE

## 2022-03-10 PROCEDURE — 99214 OFFICE O/P EST MOD 30 MIN: CPT | Performed by: FAMILY MEDICINE

## 2022-03-10 PROCEDURE — G8417 CALC BMI ABV UP PARAM F/U: HCPCS | Performed by: FAMILY MEDICINE

## 2022-03-10 PROCEDURE — G8484 FLU IMMUNIZE NO ADMIN: HCPCS | Performed by: FAMILY MEDICINE

## 2022-03-10 RX ORDER — TRAMADOL HYDROCHLORIDE 50 MG/1
50 TABLET ORAL EVERY 6 HOURS PRN
Qty: 28 TABLET | Refills: 0 | Status: SHIPPED | OUTPATIENT
Start: 2022-03-10 | End: 2022-03-17

## 2022-03-10 RX ORDER — LORAZEPAM 1 MG/1
1 TABLET ORAL 2 TIMES DAILY PRN
Qty: 45 TABLET | Refills: 1 | Status: SHIPPED | OUTPATIENT
Start: 2022-03-10 | End: 2022-05-25

## 2022-03-10 ASSESSMENT — ENCOUNTER SYMPTOMS
COUGH: 0
ABDOMINAL PAIN: 0
WHEEZING: 0
CONSTIPATION: 0
NAUSEA: 0
RHINORRHEA: 0
DIARRHEA: 0
SORE THROAT: 0
SHORTNESS OF BREATH: 0

## 2022-03-10 NOTE — PROGRESS NOTES
[address]  [address] [place] [address]  Dept: [phone]  Loc: [address] Spencer (:  1975) is a 56 y.o. female, here for evaluation of the following chief complaint(s):  Pain (feels that symptoms are worseing since office visit and ED visit on 3/9/22)      ASSESSMENT/PLAN:  1. Lump on neck  -     traMADol (ULTRAM) 50 MG tablet; Take 1 tablet by mouth every 6 hours as needed for Pain for up to 7 days. Take lowest dose possible to manage pain, Disp-28 tablet, R-0Normal  2. Generalized anxiety disorder  -     LORazepam (ATIVAN) 1 MG tablet; Take 1 tablet by mouth 2 times daily as needed for Anxiety for up to 90 days. , Disp-45 tablet, R-1Normal    Recent US reviewed  Recent ER visit and CT scan reviewed  Tramadol prn pain. Follow up with ENT next week for further eval.  Ativan prn anxiety  Avoid etoh / driving while using these meds and don't use together. No follow-ups on file. SUBJECTIVE/OBJECTIVE:  Patient presents for ER follow-up. She was seen in this office earlier this week with complaints of a tender knot on the lower part of her neck. The location is on the left side of previous scar from thyroidectomy. She states that she first noticed it while she was driving in the car as the seatbelt put pressure on it and caused pain. It has gotten worse over the past several days. She did have an ultrasound which was nondescript and was given an antibiotic but this has not seemed to make much difference yet. Last night when she was lying in bed she felt as if she was having difficulty breathing from it. She states that she gets pain at the site of the lump that also radiates down her left arm. She wonders if it is pinching and on a nerve. Did have a brother with a sarcoma age 39 so she is extremely nervous about a cancer.   She does have appointment with ENT which was initially scheduled for later this month but she just heard from them today and got it moved up to next Thursday. She is feeling quite anxious as well. States her breathing is improved and she is not having any issues currently. She did have a CT scan done in the ER which showed thickening in the epidermis as well as slight sinusitis. She denies fever chills. Review of Systems   Constitutional: Positive for activity change, appetite change and fatigue. Negative for chills and fever. HENT: Negative for congestion, rhinorrhea and sore throat. Respiratory: Negative for cough, shortness of breath and wheezing. Cardiovascular: Negative for chest pain and palpitations. Gastrointestinal: Negative for abdominal pain, constipation, diarrhea and nausea. Genitourinary: Negative for dysuria and hematuria. Musculoskeletal: Negative for arthralgias and myalgias. Neurological: Negative for dizziness and headaches. Psychiatric/Behavioral: Positive for sleep disturbance. The patient is nervous/anxious. Physical Exam  Vitals and nursing note reviewed. Constitutional:       Appearance: She is well-developed. HENT:      Head: Normocephalic and atraumatic. Eyes:      General: No scleral icterus. Right eye: No discharge. Left eye: No discharge. Conjunctiva/sclera: Conjunctivae normal.   Neck:     Cardiovascular:      Rate and Rhythm: Normal rate and regular rhythm. Heart sounds: Normal heart sounds. Pulmonary:      Effort: Pulmonary effort is normal.      Breath sounds: Normal breath sounds. No wheezing. Skin:     General: Skin is warm and dry. Neurological:      Mental Status: She is alert and oriented to person, place, and time. Mental status is at baseline. Psychiatric:         Mood and Affect: Mood is anxious. Behavior: Behavior normal.         Thought Content:  Thought content normal.         Judgment: Judgment normal.         Vitals:    03/10/22 1119   BP: 124/74   Pulse: 64   Resp: 18 Temp: 97.9 °F (36.6 °C)   SpO2: 97%        On this date 3/10/2022 I have spent 30 minutes reviewing previous notes, test results and face to face with the patient discussing the diagnosis and importance of compliance with the treatment plan as well as documenting on the day of the visit. An electronic signature was used to authenticate this note.     --Author MD Zenia

## 2022-03-14 DIAGNOSIS — F43.21 GRIEF: ICD-10-CM

## 2022-03-14 DIAGNOSIS — I10 ESSENTIAL HYPERTENSION: ICD-10-CM

## 2022-03-15 RX ORDER — ESCITALOPRAM OXALATE 20 MG/1
TABLET ORAL
Qty: 90 TABLET | Refills: 3 | Status: SHIPPED | OUTPATIENT
Start: 2022-03-15

## 2022-03-15 RX ORDER — BUSPIRONE HYDROCHLORIDE 7.5 MG/1
TABLET ORAL
Qty: 60 TABLET | Refills: 1 | Status: SHIPPED | OUTPATIENT
Start: 2022-03-15 | End: 2022-07-04

## 2022-03-15 RX ORDER — LISINOPRIL AND HYDROCHLOROTHIAZIDE 25; 20 MG/1; MG/1
TABLET ORAL
Qty: 90 TABLET | Refills: 3 | Status: SHIPPED | OUTPATIENT
Start: 2022-03-15

## 2022-03-17 ENCOUNTER — PATIENT MESSAGE (OUTPATIENT)
Dept: FAMILY MEDICINE CLINIC | Age: 47
End: 2022-03-17

## 2022-03-17 DIAGNOSIS — R22.1 LUMP ON NECK: Primary | ICD-10-CM

## 2022-03-17 NOTE — TELEPHONE ENCOUNTER
From: Marci Aldana  To: Dr. Lee Sanders: 3/17/2022 3:15 PM EDT  Subject: ENT    Hi Dr Tania Maldonado,  I just left my appointment with Dr Anitra Marin. He is not convinced that he can help me. He ordered a metrology dose pack, and thinks this is an orthopedic issue? ? Not sure what step is next. Area is , do you feel that a biopsy is needed? ? If so, can we proceed with it? I forgot to list the sleeve as one of my surgeries, is the medrol dose pack safe to take?   Thank you for your time,  Marlene Clay

## 2022-03-24 ENCOUNTER — OFFICE VISIT (OUTPATIENT)
Dept: SURGERY | Age: 47
End: 2022-03-24
Payer: COMMERCIAL

## 2022-03-24 VITALS
WEIGHT: 293 LBS | HEART RATE: 77 BPM | TEMPERATURE: 96.7 F | BODY MASS INDEX: 45.99 KG/M2 | SYSTOLIC BLOOD PRESSURE: 122 MMHG | HEIGHT: 67 IN | RESPIRATION RATE: 18 BRPM | OXYGEN SATURATION: 93 % | DIASTOLIC BLOOD PRESSURE: 62 MMHG

## 2022-03-24 DIAGNOSIS — M79.89 MASS OF SOFT TISSUE OF NECK: Primary | ICD-10-CM

## 2022-03-24 DIAGNOSIS — M25.512 PAIN OF LEFT STERNOCLAVICULAR JOINT: ICD-10-CM

## 2022-03-24 PROCEDURE — G8484 FLU IMMUNIZE NO ADMIN: HCPCS | Performed by: SURGERY

## 2022-03-24 PROCEDURE — G8427 DOCREV CUR MEDS BY ELIG CLIN: HCPCS | Performed by: SURGERY

## 2022-03-24 PROCEDURE — 99243 OFF/OP CNSLTJ NEW/EST LOW 30: CPT | Performed by: SURGERY

## 2022-03-24 PROCEDURE — G8417 CALC BMI ABV UP PARAM F/U: HCPCS | Performed by: SURGERY

## 2022-03-24 NOTE — PROGRESS NOTES
throughout out the day is actually worse in the morning. She had somewhat similar symptoms after her thyroidectomy in 2016 but these have resolved. She did see ENT Dr. Laron Elizalde in [place]. His examination was unremarkable and did not show any abnormality on his oropharyngeal or neck exam.  She has no past below masses in her neck or adenopathy. She is tender over the left sternoclavicular joint with some slight subtle increased soft tissue density but this is also in the area of her incision. She completed a Medrol Dosepak is taking meloxicam but still has discomfort. ENT felt it was sternoclavicular joint pain recommended evaluation by OSVALDO DILLARD. I did offer her a bone scan to see if there was any inflammation at that joint. She was offered biopsy of the area but I felt it would be low yield and would not alleviate her symptoms. She strongly desires to proceed with surgical exploration of the area in the operating suite. Sigifredo Mai denies any recent history of trauma. She denies any fever, chills or night sweats.     Past Medical History  Past Medical History:   Diagnosis Date    Headache(784.0)     Hypertension     Infertility     Nausea & vomiting     with c birth    Neck mass 2022    left side    YIFAN on CPAP     Polycystic ovaries     PONV (postoperative nausea and vomiting)     PVC (premature ventricular contraction)     seen by Dr. Silas Arteaga, echo done (normal) - decreased caffeine & PVC are less    Thyroid disease     Hashimotos       Past Surgical History  Past Surgical History:   Procedure Laterality Date    APPENDECTOMY  2004    [place]     SECTION      x2    CHOLECYSTECTOMY      [place]    CYST REMOVAL      ovarian    DILATION AND CURETTAGE OF UTERUS  2014    HYSTEROSCOPY  2014    Dilation and Curettage    KNEE ARTHROSCOPY Left 2015    [facility]    OTHER SURGICAL HISTORY Right 2015    Right Knee Arthroscopy    SLEEVE GASTRECTOMY  10/10/2016 robotic-Dr Dorsey    THYROIDECTOMY Bilateral 09/2016    Dr Piter Pathak TOOTH EXTRACTION Right 05/30/2013    right lower    UPPER [facility]OINTESTINAL ENDOSCOPY  2016    [facility]       Medications  Current Outpatient Medications   Medication Sig Dispense Refill    escitalopram (LEXAPRO) 20 MG tablet take 1 tablet by mouth once daily 90 tablet 3    busPIRone (BUSPAR) 7.5 MG tablet take 1 tablet by mouth twice a day 60 tablet 1    lisinopril-hydroCHLOROthiazide (PRINZIDE;ZESTORETIC) 20-25 MG per tablet take 1 tablet by mouth once daily 90 tablet 3    LORazepam (ATIVAN) 1 MG tablet Take 1 tablet by mouth 2 times daily as needed for Anxiety for up to 90 days. 45 tablet 1    lansoprazole (PREVACID) 30 MG delayed release capsule Take 1 capsule by mouth daily 30 capsule 0    levothyroxine (SYNTHROID) 75 MCG tablet 4 tablets daily      ondansetron (ZOFRAN ODT) 4 MG disintegrating tablet Take 1 tablet by mouth every 8 hours as needed for Nausea or Vomiting 45 tablet 2    butalbital-acetaminophen-caffeine (FIORICET, ESGIC) -40 MG per tablet Take 1 tablet by mouth every 4 hours as needed for Headaches or Migraine 60 tablet 3    nystatin (MYCOSTATIN) 321993 UNIT/GM powder Apply 3 times daily. 60 g 3    vitamin B-12 (CYANOCOBALAMIN) 1000 MCG tablet Take 1,000 mcg by mouth daily      Cholecalciferol (VITAMIN D3) 94552 UNITS CAPS Take 1 capsule by mouth once a week (Patient taking differently: Take 1 capsule by mouth daily ) 4 capsule 1    calcium carbonate (OSCAL) 500 MG TABS tablet Take 500 mg by mouth 3 times daily      multivitamin (THERAGRAN) per tablet Take 1 tablet by mouth daily. No current facility-administered medications for this visit.      Allergies   No Known Allergies    Family History  Family History   Problem Relation Age of Onset    Hypertension Mother    Romana Martellklaleah Arthritis Mother     Other Mother         at fib    Obesity Father     Lung Cancer Father     Obesity Brother     Cancer Brother 39        Downey sarcoma    No Known Problems Maternal Grandmother     No Known Problems Maternal Grandfather     No Known Problems Paternal Grandmother     Heart Attack Paternal Grandfather        SocialHistory  Social History     Socioeconomic History    Marital status:      Spouse name: Not on file    Number of children: Not on file    Years of education: Not on file    Highest education level: Not on file   Occupational History    Not on file   Tobacco Use    Smoking status: Never Smoker    Smokeless tobacco: Never Used   Substance and Sexual Activity    Alcohol use: Yes     Alcohol/week: 0.0 standard drinks     Comment: social    Drug use: No    Sexual activity: Yes   Other Topics Concern    Not on file   Social History Narrative    Not on file     Social Determinants of Health     Financial Resource Strain: Low Risk     Difficulty of Paying Living Expenses: Not hard at all   Food Insecurity: No Food Insecurity    Worried About Running Out of Food in the Last Year: Never true    Roxy of Food in the Last Year: Never true   Transportation Needs: No Transportation Needs    Lack of Transportation (Medical): No    Lack of Transportation (Non-Medical):  No   Physical Activity:     Days of Exercise per Week: Not on file    Minutes of Exercise per Session: Not on file   Stress:     Feeling of Stress : Not on file   Social Connections:     Frequency of Communication with Friends and Family: Not on file    Frequency of Social Gatherings with Friends and Family: Not on file    Attends Scientology Services: Not on file    Active Member of Clubs or Organizations: Not on file    Attends Club or Organization Meetings: Not on file    Marital Status: Not on file   Intimate Partner Violence:     Fear of Current or Ex-Partner: Not on file    Emotionally Abused: Not on file    Physically Abused: Not on file    Sexually Abused: Not on file   Housing Stability:     Unable to Pay for Housing in the Last Year: Not on file    Number of Places Lived in the Last Year: Not on file    Unstable Housing in the Last Year: Not on file           Review of Systems  Review of Systems   Constitutional: Positive for fatigue. Negative for chills, fever and unexpected weight change. HENT: Positive for voice change. Negative for sore throat and trouble swallowing. Eyes: Negative for pain and visual disturbance. Glasses   Respiratory: Negative for cough, shortness of breath and wheezing. Cardiovascular: Negative for chest pain and palpitations. Gastrointestinal: Negative for abdominal pain, blood in stool, nausea and vomiting. Endocrine: Negative for cold intolerance, heat intolerance and polydipsia. Genitourinary: Negative for dysuria, flank pain and hematuria. Musculoskeletal: Positive for neck pain. Negative for gait problem, joint swelling and myalgias. Skin: Negative for color change and rash. Allergic/Immunologic: Negative for immunocompromised state. Neurological: Negative for dizziness, tremors and headaches. Hematological: Negative for adenopathy. Does not bruise/bleed easily. Psychiatric/Behavioral: Negative for behavioral problems and confusion. OBJECTIVE     /62 (Site: Right Upper Arm, Position: Sitting, Cuff Size: Medium Adult)   Pulse 77   Temp 96.7 °F (35.9 °C) (Temporal)   Resp 18   Ht 5' 7\" (1.702 m)   Wt (!) 331 lb (150.1 kg)   SpO2 93%   BMI 51.84 kg/m²      Physical Exam  Vitals reviewed. Constitutional:       Appearance: She is well-developed. She is not ill-appearing. Comments: bmi 46   HENT:      Head: Normocephalic and atraumatic. Comments: glasses     Nose: No congestion. Eyes:      General: No scleral icterus. Extraocular Movements: Extraocular movements intact. Pupils: Pupils are equal, round, and reactive to light. Neck:      Thyroid: No thyromegaly. Vascular: No JVD. Trachea: No tracheal deviation. Comments: Raspy voice  Cardiovascular:      Rate and Rhythm: Normal rate and regular rhythm. Heart sounds: Normal heart sounds. Pulmonary:      Effort: No respiratory distress. Breath sounds: Normal breath sounds. No wheezing. Abdominal:      General: Bowel sounds are normal. There is no distension. Palpations: Abdomen is soft. Tenderness: There is no abdominal tenderness. There is no guarding or rebound. Musculoskeletal:         General: No deformity. Cervical back: Neck supple. Tenderness present. No rigidity. Lymphadenopathy:      Cervical: No cervical adenopathy. Right cervical: No superficial, deep or posterior cervical adenopathy. Left cervical: No superficial or deep cervical adenopathy. Skin:     General: Skin is warm and dry. Coloration: Skin is not jaundiced or pale. Findings: No rash. Neurological:      General: No focal deficit present. Mental Status: She is alert and oriented to person, place, and time. Cranial Nerves: No cranial nerve deficit. Psychiatric:         Mood and Affect: Mood normal.         Thought Content: Thought content normal.         Lab Results   Component Value Date    WBC 8.4 03/08/2022    HGB 13.3 03/08/2022    HCT 41.4 03/08/2022     03/08/2022    CHOL 142 05/02/2019    TRIG 58 05/02/2019    HDL 56 05/02/2019    ALT 19 01/10/2022    AST 16 01/10/2022     01/10/2022    K 3.6 01/10/2022     01/10/2022    CREATININE 0.8 01/10/2022    BUN 13 01/10/2022    CO2 29 01/10/2022    TSH 9.580 (H) 10/25/2021    INR 1.03 10/18/2019    LABA1C 4.9 10/05/2017       Narrative   PROCEDURE: CT SOFT TISSUE NECK W CONTRAST       CLINICAL INFORMATION: left sided neck nodule.  Difficulty swallowing while supine.       COMPARISON: Neck ultrasound dated 3/8/2022 and CT neck dated 9/26/2016.       TECHNIQUE: 3 mm axial images were obtained through the neck soft tissues after administration of 70 mL  injected in the right [facility] with sagittal and coronal reconstructions.        All CT scans at this facility use dose modulation, iterative reconstruction, and/or weight-based dosing when appropriate to reduce radiation dose to as low as reasonably achievable.       FINDINGS:   There is streak artifact from dental amalgam which partially obscures oral and perioral soft tissues. There is medialization of the left internal carotid artery with partial retropharyngeal course with associated mild asymmetry of the mucosa at the left    oropharynx. Vocal cords are closely apposed limiting evaluation of the larynx. Given these caveats, mucosal surfaces of the aerodigestive tract are symmetric without focal nodular thickening or visualized mass. There are scattered small cervical chain    lymph nodes bilaterally, similar to prior CT without definite cervical lymphadenopathy. There are stable small intraparotid lymph nodes are parotid glands are fatty replaced, similar to prior exam. Submandibular glands are unremarkable. The thyroid gland    appears to be surgically absent. No mass is identified subjacent to the BB marker at the anterior inferior neck. However, there is apparent bandlike thickening of the epidermis of the neck subjacent to the BB marker.       Visualized intracranial contents are grossly unremarkable. Orbits are unremarkable. The right maxillary sinus is completely opacified. There is a mucous retention cyst in the left maxillary sinus. There is mild mucosal thickening in the ethmoid air    cells. Mastoid air cells are clear. Great vessels of the neck appear patent. Visualized portions of the lungs are clear. There are mild-to-moderate degenerative changes of the cervical spine. No suspicious osseous lesions are identified.           Impression       1. No mass or adenopathy identified. 2. Apparent thickening of the epidermis at the anterior aspect of the neck inferiorly at the level of the BB marker.    3. Prominent right antral sinusitis.                 **This report has been created using voice recognition software. It may contain minor errors which are inherent in voice recognition technology. **       Final report electronically signed by Dr. Alondra Diaz MD on 3/9/2022 4:49 PM            PROCEDURE: US HEAD NECK SOFT TISSUE THYROID       CLINICAL INFORMATION: Lump on neck       COMPARISON: CTA chest 1/10/2022       TECHNIQUE: Focused ultrasound of the region of concern at the sternoclavicular region. Grayscale and color Doppler imaging was performed           FINDINGS:        A 1.1 x 0.7 x 1.2 cm isoechoic abnormality with ill-defined borders is seen at the area of concern at the left sternoclavicular joint               Impression   1. A 1.1 x 0.7 x 1.2 cm isoechoic abnormality is seen at the area of concern at the left sternoclavicular joint. This is not well characterized ultrasound. Further workup may be necessary as clinically warranted                       **This report has been created using voice recognition software.  It may contain minor errors which are inherent in voice recognition technology. **       Final report electronically signed by Dr Keyla Frias on 3/8/2022 1:48 PM

## 2022-03-25 ENCOUNTER — TELEPHONE (OUTPATIENT)
Dept: SURGERY | Age: 47
End: 2022-03-25

## 2022-03-25 DIAGNOSIS — Z01.818 PRE-OP TESTING: ICD-10-CM

## 2022-03-25 DIAGNOSIS — M79.89 MASS OF SOFT TISSUE OF NECK: Primary | ICD-10-CM

## 2022-03-25 DIAGNOSIS — M25.512 PAIN OF LEFT STERNOCLAVICULAR JOINT: ICD-10-CM

## 2022-03-25 ASSESSMENT — ENCOUNTER SYMPTOMS
TROUBLE SWALLOWING: 0
WHEEZING: 0
SORE THROAT: 0
EYE PAIN: 0
SHORTNESS OF BREATH: 0
ABDOMINAL PAIN: 0
VOMITING: 0
COLOR CHANGE: 0
BLOOD IN STOOL: 0
VOICE CHANGE: 1
NAUSEA: 0
COUGH: 0

## 2022-03-25 NOTE — TELEPHONE ENCOUNTER
Pt called in after appointment with Dr. Renaldo Reed wanting to schedule excision of her neck mass. At office appointment it was discussed to have done in procedure room however pt now requesting it be done in surgery with sedation. Surgery scheduled 4/13, arrive at 6 am ASC, NPO after midnight, bring a , shower with antibacterial soap morning of surgery, no jewelry or piercings. Preop BMP to be done prior.     Pt voiced understanding

## 2022-04-03 ENCOUNTER — APPOINTMENT (OUTPATIENT)
Dept: GENERAL RADIOLOGY | Age: 47
End: 2022-04-03
Payer: COMMERCIAL

## 2022-04-03 ENCOUNTER — HOSPITAL ENCOUNTER (EMERGENCY)
Age: 47
Discharge: HOME | End: 2022-04-03
Attending: EMERGENCY MEDICINE
Payer: COMMERCIAL

## 2022-04-03 VITALS
HEIGHT: 69 IN | DIASTOLIC BLOOD PRESSURE: 87 MMHG | BODY MASS INDEX: 43.4 KG/M2 | SYSTOLIC BLOOD PRESSURE: 140 MMHG | RESPIRATION RATE: 16 BRPM | HEART RATE: 74 BPM | WEIGHT: 293 LBS | OXYGEN SATURATION: 96 %

## 2022-04-03 DIAGNOSIS — M25.512 ACUTE PAIN OF LEFT SHOULDER: Primary | ICD-10-CM

## 2022-04-03 DIAGNOSIS — S46.002A INJURY OF LEFT ROTATOR CUFF, INITIAL ENCOUNTER: ICD-10-CM

## 2022-04-03 PROCEDURE — 6370000000 HC RX 637 (ALT 250 FOR IP): Performed by: EMERGENCY MEDICINE

## 2022-04-03 PROCEDURE — 6360000002 HC RX W HCPCS: Performed by: EMERGENCY MEDICINE

## 2022-04-03 PROCEDURE — 96372 THER/PROPH/DIAG INJ SC/IM: CPT

## 2022-04-03 PROCEDURE — 2709999900 HC NON-CHARGEABLE SUPPLY

## 2022-04-03 PROCEDURE — 99284 EMERGENCY DEPT VISIT MOD MDM: CPT

## 2022-04-03 PROCEDURE — 73030 X-RAY EXAM OF SHOULDER: CPT

## 2022-04-03 RX ORDER — KETOROLAC TROMETHAMINE 30 MG/ML
30 INJECTION, SOLUTION INTRAMUSCULAR; INTRAVENOUS ONCE
Status: COMPLETED | OUTPATIENT
Start: 2022-04-03 | End: 2022-04-03

## 2022-04-03 RX ADMIN — KETOROLAC TROMETHAMINE 30 MG: 30 INJECTION, SOLUTION INTRAMUSCULAR at 19:48

## 2022-04-03 RX ADMIN — Medication: at 21:02

## 2022-04-03 ASSESSMENT — PAIN DESCRIPTION - LOCATION: LOCATION: SHOULDER

## 2022-04-03 ASSESSMENT — PAIN SCALES - GENERAL
PAINLEVEL_OUTOF10: 7
PAINLEVEL_OUTOF10: 7

## 2022-04-03 ASSESSMENT — PAIN DESCRIPTION - ORIENTATION: ORIENTATION: LEFT

## 2022-04-03 ASSESSMENT — PAIN DESCRIPTION - ONSET: ONSET: AWAKENED FROM SLEEP

## 2022-04-03 ASSESSMENT — PAIN DESCRIPTION - FREQUENCY: FREQUENCY: CONTINUOUS

## 2022-04-03 ASSESSMENT — PAIN DESCRIPTION - DESCRIPTORS: DESCRIPTORS: ACHING

## 2022-04-03 NOTE — ED PROVIDER NOTES
[address]  [address] [place] [address] [address]  Phone: [phone]    eMERGENCY dEPARTMENT eNCOUnter           [address] [facility]       Chief Complaint   Patient presents with    Shoulder Injury       Nurses Notes reviewed and I agree except as noted in the HPI. HISTORY OF PRESENT ILLNESS    Sydnee De Jesus is a 56 y.o. female who presented via private vehicle with the above-mentioned complaints. She said that she missed a step and fell on her left side against the front door yesterday. She is complaining of moderate, sharp, intermittent left shoulder pain which is worse with shoulder movement. Pain did not radiate anywhere. She denies head or neck injury. REVIEW OF SYSTEMS     None except as mentioned above. PAST MEDICAL HISTORY    has a past medical history of Headache(784.0), Hypertension, Infertility, Nausea & vomiting, Neck mass, YIFAN on CPAP, Polycystic ovaries, PONV (postoperative nausea and vomiting), PVC (premature ventricular contraction), and Thyroid disease. SURGICAL HISTORY      has a past surgical history that includes  section; cyst removal; Tooth Extraction (Right, 2013); Appendectomy (); Cholecystectomy (); hysteroscopy (2014); Dilation and curettage of uterus (2014); other surgical history (Right, 2015); Knee arthroscopy (Left, ); Thyroidectomy (Bilateral, 2016); Sleeve Gastrectomy (10/10/2016); and Upper gastrointestinal endoscopy ().     CURRENT MEDICATIONS       Previous Medications    BUSPIRONE (BUSPAR) 7.5 MG TABLET    take 1 tablet by mouth twice a day    BUTALBITAL-ACETAMINOPHEN-CAFFEINE (FIORICET, ESGIC) -40 MG PER TABLET    Take 1 tablet by mouth every 4 hours as needed for Headaches or Migraine    CALCIUM CARBONATE (OSCAL) 500 MG TABS TABLET    Take 500 mg by mouth 3 times daily    CHOLECALCIFEROL (VITAMIN D3) 72718 UNITS CAPS    Take 1 capsule by mouth once a week    ESCITALOPRAM (LEXAPRO) 20 MG TABLET    take 1 tablet by mouth once daily    LANSOPRAZOLE (PREVACID) 30 MG DELAYED RELEASE CAPSULE    Take 1 capsule by mouth daily    LEVOTHYROXINE (SYNTHROID) 75 MCG TABLET    4 tablets daily    LISINOPRIL-HYDROCHLOROTHIAZIDE (PRINZIDE;ZESTORETIC) 20-25 MG PER TABLET    take 1 tablet by mouth once daily    LORAZEPAM (ATIVAN) 1 MG TABLET    Take 1 tablet by mouth 2 times daily as needed for Anxiety for up to 90 days. MULTIVITAMIN (THERAGRAN) PER TABLET    Take 1 tablet by mouth daily. NYSTATIN (MYCOSTATIN) 258146 UNIT/GM POWDER    Apply 3 times daily. ONDANSETRON (ZOFRAN ODT) 4 MG DISINTEGRATING TABLET    Take 1 tablet by mouth every 8 hours as needed for Nausea or Vomiting    VITAMIN B-12 (CYANOCOBALAMIN) 1000 MCG TABLET    Take 1,000 mcg by mouth daily       ALLERGIES     has No Known Allergies. FAMILY HISTORY     She indicated that her mother is alive. She indicated that her father is . She indicated that only one of her two brothers is alive. She indicated that her maternal grandmother is . She indicated that her maternal grandfather is . She indicated that her paternal grandmother is . She indicated that her paternal grandfather is . family history includes Arthritis in her mother; Cancer (age of onset: 39) in her brother; Heart Attack in her paternal grandfather; Hypertension in her mother; Pearlene Proffer in her father; No Known Problems in her maternal grandfather, maternal grandmother, and paternal grandmother; Obesity in her brother and father; Other in her mother. SOCIAL HISTORY      reports that she has never smoked. She has never used smokeless tobacco. She reports current alcohol use. She reports that she does not use drugs. PHYSICAL EXAM     INITIAL VITALS:  height is 5' 9\" (1.753 m) and weight is 318 lb (144.2 kg) (abnormal). Her blood pressure is 140/87 (abnormal) and her pulse is 74. Her respiration is 16 and oxygen saturation is 96%. Physical Exam  Constitutional:       General: She is in acute distress. Appearance: She is not ill-appearing. Comments: She looks slightly uncomfortable. HENT:      Head: Atraumatic. Musculoskeletal:      Cervical back: No tenderness. Comments: Examination of the left shoulder showed no deformity or swelling. There is tenderness to palpation of the anterior shoulder joint area. She has significant pain with attempting raising her arm overhead. She has normal neurovascular examination of left upper extremity. Neurological:      Mental Status: She is alert. DIFFERENTIAL DIAGNOSIS:       DIAGNOSTIC RESULTS       RADIOLOGY:  3 views of the left shoulder.     COMPARISON: None     FINDINGS:  Proximal left humerus, the left scapula, and the left clavicle appear   intact. Humeral head is appropriately seated in the glenoid. Small ossifications   present along the greater tuberosity. Mild hypertrophy of the left acromioclavicular joint. Visualized portions of the left lung are clear. Surgical clips along the lower neck.     IMPRESSION:  1. No radiographic evidence of acute injury to the left shoulder. 2. Small ossifications present along the greater tuberosity suggestive of   rotator cuff calcific tendinosis.     This document has been electronically signed by: Dilma Yousif MD on   04/03/2022 09:06 PM  LABS:   Labs Reviewed - No data to display    EMERGENCY DEPARTMENT COURSE:   Vitals:    Vitals:    04/03/22 1929   BP: (!) 140/87   Pulse: 74   Resp: 16   SpO2: 96%   Weight: (!) 318 lb (144.2 kg)   Height: 5' 9\" (1.753 m)     She received Toradol 30 mg IM. She reported improvement. She also received left arm sling. I discussed diagnosis and discharge instructions with her. FINAL IMPRESSION      1. Acute pain of left shoulder    2. Injury of left rotator cuff, initial encounter          DISPOSITION/PLAN   Discharged home in good condition.     PATIENT REFERRED TO:  Andreina Sheets, MD  [address] [place] [facility] [address]  [phone]    In 2 days        DISCHARGE MEDICATIONS:  New Prescriptions    No medications on file       (Please note that portions of this note were completed with a voice recognition program.  Efforts were made to edit the dictations but occasionally words are mis-transcribed.)    Griselda Rivero MD           Griseldazeenat Rivero MD  04/03/22 [id number]       Griselda Rivero MD  04/04/22 [id number]

## 2022-04-03 NOTE — ED TRIAGE NOTES
Pt was walking into the house yesterday around 6pm and missed a step falling on her left hip and hitting her left shoulder on the door frame. At the time she just felt sore. She woke around 3am in pain that has worsened as the day went on. Unable to raise arm above midline.

## 2022-04-03 NOTE — Clinical Note
Gaston Lombardi was seen and treated in our emergency department on 4/3/2022. She may return to work on 04/05/2022. If you have any questions or concerns, please don't hesitate to call.       Christiano Fowler MD

## 2022-04-04 ENCOUNTER — TELEPHONE (OUTPATIENT)
Dept: FAMILY MEDICINE CLINIC | Age: 47
End: 2022-04-04

## 2022-04-04 NOTE — ED NOTES
Sling applied to left arm and  Norco starter pack given. Pt educated on rotator cuff exercises recommended by physician. D/c instructions given at this time as well. All questions answered, no further requests.        Daniel Merritt RN  04/03/22 9113

## 2022-04-11 ENCOUNTER — HOSPITAL ENCOUNTER (OUTPATIENT)
Age: 47
Discharge: HOME | End: 2022-04-11
Payer: COMMERCIAL

## 2022-04-11 DIAGNOSIS — M25.512 PAIN OF LEFT STERNOCLAVICULAR JOINT: ICD-10-CM

## 2022-04-11 DIAGNOSIS — Z01.818 PRE-OP TESTING: ICD-10-CM

## 2022-04-11 DIAGNOSIS — M79.89 MASS OF SOFT TISSUE OF NECK: ICD-10-CM

## 2022-04-11 LAB
ANION GAP SERPL CALCULATED.3IONS-SCNC: 9 MEQ/L (ref 8–16)
BUN BLDV-MCNC: 11 MG/DL (ref 7–22)
CALCIUM SERPL-MCNC: 9.2 MG/DL (ref 8.5–10.5)
CHLORIDE BLD-SCNC: 100 MEQ/L (ref 98–111)
CO2: 30 MEQ/L (ref 23–33)
CREAT SERPL-MCNC: 0.5 MG/DL (ref 0.4–1.2)
GFR SERPL CREATININE-BSD FRML MDRD: > 90 ML/MIN/1.73M2
GLUCOSE BLD-MCNC: 83 MG/DL (ref 70–108)
POTASSIUM SERPL-SCNC: 4.1 MEQ/L (ref 3.5–5.2)
SODIUM BLD-SCNC: 139 MEQ/L (ref 135–145)

## 2022-04-11 PROCEDURE — 80048 BASIC METABOLIC PNL TOTAL CA: CPT

## 2022-04-11 PROCEDURE — 36415 COLL VENOUS BLD VENIPUNCTURE: CPT

## 2022-04-12 ENCOUNTER — PREP FOR PROCEDURE (OUTPATIENT)
Dept: SURGERY | Age: 47
End: 2022-04-12

## 2022-04-12 RX ORDER — SODIUM CHLORIDE 9 MG/ML
INJECTION, SOLUTION INTRAVENOUS CONTINUOUS
Status: CANCELLED | OUTPATIENT
Start: 2022-04-12

## 2022-04-13 ENCOUNTER — HOSPITAL ENCOUNTER (OUTPATIENT)
Age: 47
Setting detail: OUTPATIENT SURGERY
Discharge: HOME | End: 2022-04-13
Attending: SURGERY | Admitting: SURGERY
Payer: COMMERCIAL

## 2022-04-13 ENCOUNTER — ANESTHESIA (OUTPATIENT)
Dept: OPERATING ROOM | Age: 47
End: 2022-04-13
Payer: COMMERCIAL

## 2022-04-13 ENCOUNTER — ANESTHESIA EVENT (OUTPATIENT)
Dept: OPERATING ROOM | Age: 47
End: 2022-04-13
Payer: COMMERCIAL

## 2022-04-13 VITALS
TEMPERATURE: 97.3 F | OXYGEN SATURATION: 94 % | HEART RATE: 62 BPM | DIASTOLIC BLOOD PRESSURE: 60 MMHG | SYSTOLIC BLOOD PRESSURE: 106 MMHG | RESPIRATION RATE: 14 BRPM

## 2022-04-13 VITALS
DIASTOLIC BLOOD PRESSURE: 66 MMHG | OXYGEN SATURATION: 98 % | SYSTOLIC BLOOD PRESSURE: 120 MMHG | RESPIRATION RATE: 11 BRPM

## 2022-04-13 PROBLEM — M79.89 SOFT TISSUE MASS: Status: ACTIVE | Noted: 2022-04-13

## 2022-04-13 LAB — PREGNANCY, URINE: NEGATIVE

## 2022-04-13 PROCEDURE — 2500000003 HC RX 250 WO HCPCS: Performed by: SURGERY

## 2022-04-13 PROCEDURE — 7100000011 HC PHASE II RECOVERY - ADDTL 15 MIN: Performed by: SURGERY

## 2022-04-13 PROCEDURE — 3700000000 HC ANESTHESIA ATTENDED CARE: Performed by: SURGERY

## 2022-04-13 PROCEDURE — 7100000010 HC PHASE II RECOVERY - FIRST 15 MIN: Performed by: SURGERY

## 2022-04-13 PROCEDURE — 88305 TISSUE EXAM BY PATHOLOGIST: CPT

## 2022-04-13 PROCEDURE — 23071 EXC SHOULDER LES SC 3 CM/>: CPT | Performed by: SURGERY

## 2022-04-13 PROCEDURE — 3600000002 HC SURGERY LEVEL 2 BASE: Performed by: SURGERY

## 2022-04-13 PROCEDURE — 6360000002 HC RX W HCPCS

## 2022-04-13 PROCEDURE — 81025 URINE PREGNANCY TEST: CPT

## 2022-04-13 PROCEDURE — 2580000003 HC RX 258

## 2022-04-13 PROCEDURE — 3600000012 HC SURGERY LEVEL 2 ADDTL 15MIN: Performed by: SURGERY

## 2022-04-13 PROCEDURE — 6360000002 HC RX W HCPCS: Performed by: NURSE ANESTHETIST, CERTIFIED REGISTERED

## 2022-04-13 PROCEDURE — 3700000001 HC ADD 15 MINUTES (ANESTHESIA): Performed by: SURGERY

## 2022-04-13 RX ORDER — MORPHINE SULFATE 2 MG/ML
2 INJECTION, SOLUTION INTRAMUSCULAR; INTRAVENOUS
Status: CANCELLED | OUTPATIENT
Start: 2022-04-13

## 2022-04-13 RX ORDER — TRAMADOL HYDROCHLORIDE 50 MG/1
50 TABLET ORAL EVERY 6 HOURS PRN
Status: CANCELLED | OUTPATIENT
Start: 2022-04-13

## 2022-04-13 RX ORDER — FENTANYL CITRATE 50 UG/ML
INJECTION, SOLUTION INTRAMUSCULAR; INTRAVENOUS PRN
Status: DISCONTINUED | OUTPATIENT
Start: 2022-04-13 | End: 2022-04-13 | Stop reason: SDUPTHER

## 2022-04-13 RX ORDER — SODIUM CHLORIDE 0.9 % (FLUSH) 0.9 %
5-40 SYRINGE (ML) INJECTION EVERY 12 HOURS SCHEDULED
Status: CANCELLED | OUTPATIENT
Start: 2022-04-13

## 2022-04-13 RX ORDER — MORPHINE SULFATE 2 MG/ML
4 INJECTION, SOLUTION INTRAMUSCULAR; INTRAVENOUS
Status: CANCELLED | OUTPATIENT
Start: 2022-04-13

## 2022-04-13 RX ORDER — LIDOCAINE HYDROCHLORIDE AND EPINEPHRINE 10; 10 MG/ML; UG/ML
INJECTION, SOLUTION INFILTRATION; PERINEURAL PRN
Status: DISCONTINUED | OUTPATIENT
Start: 2022-04-13 | End: 2022-04-13 | Stop reason: ALTCHOICE

## 2022-04-13 RX ORDER — ACETAMINOPHEN 325 MG/1
650 TABLET ORAL EVERY 4 HOURS PRN
Status: CANCELLED | OUTPATIENT
Start: 2022-04-13

## 2022-04-13 RX ORDER — PROPOFOL 10 MG/ML
INJECTION, EMULSION INTRAVENOUS CONTINUOUS PRN
Status: DISCONTINUED | OUTPATIENT
Start: 2022-04-13 | End: 2022-04-13 | Stop reason: SDUPTHER

## 2022-04-13 RX ORDER — SODIUM CHLORIDE 9 MG/ML
INJECTION, SOLUTION INTRAVENOUS CONTINUOUS
Status: CANCELLED | OUTPATIENT
Start: 2022-04-13

## 2022-04-13 RX ORDER — SODIUM CHLORIDE 9 MG/ML
INJECTION, SOLUTION INTRAVENOUS CONTINUOUS
Status: DISCONTINUED | OUTPATIENT
Start: 2022-04-13 | End: 2022-04-13 | Stop reason: HOSPADM

## 2022-04-13 RX ORDER — PROPOFOL 10 MG/ML
INJECTION, EMULSION INTRAVENOUS PRN
Status: DISCONTINUED | OUTPATIENT
Start: 2022-04-13 | End: 2022-04-13

## 2022-04-13 RX ORDER — BUPIVACAINE HYDROCHLORIDE 5 MG/ML
INJECTION, SOLUTION PERINEURAL PRN
Status: DISCONTINUED | OUTPATIENT
Start: 2022-04-13 | End: 2022-04-13 | Stop reason: ALTCHOICE

## 2022-04-13 RX ORDER — TRAMADOL HYDROCHLORIDE 50 MG/1
100 TABLET ORAL EVERY 6 HOURS PRN
Status: CANCELLED | OUTPATIENT
Start: 2022-04-13

## 2022-04-13 RX ORDER — SODIUM CHLORIDE 9 MG/ML
25 INJECTION, SOLUTION INTRAVENOUS PRN
Status: CANCELLED | OUTPATIENT
Start: 2022-04-13

## 2022-04-13 RX ORDER — TRAMADOL HYDROCHLORIDE 50 MG/1
50 TABLET ORAL EVERY 6 HOURS PRN
COMMUNITY
End: 2022-05-02 | Stop reason: SDUPTHER

## 2022-04-13 RX ORDER — MIDAZOLAM HYDROCHLORIDE 1 MG/ML
INJECTION INTRAMUSCULAR; INTRAVENOUS PRN
Status: DISCONTINUED | OUTPATIENT
Start: 2022-04-13 | End: 2022-04-13 | Stop reason: SDUPTHER

## 2022-04-13 RX ORDER — PROPOFOL 10 MG/ML
INJECTION, EMULSION INTRAVENOUS PRN
Status: DISCONTINUED | OUTPATIENT
Start: 2022-04-13 | End: 2022-04-13 | Stop reason: SDUPTHER

## 2022-04-13 RX ORDER — ONDANSETRON 2 MG/ML
4 INJECTION INTRAMUSCULAR; INTRAVENOUS EVERY 6 HOURS PRN
Status: CANCELLED | OUTPATIENT
Start: 2022-04-13

## 2022-04-13 RX ORDER — SODIUM CHLORIDE 0.9 % (FLUSH) 0.9 %
5-40 SYRINGE (ML) INJECTION PRN
Status: CANCELLED | OUTPATIENT
Start: 2022-04-13

## 2022-04-13 RX ORDER — ONDANSETRON 4 MG/1
4 TABLET, ORALLY DISINTEGRATING ORAL EVERY 8 HOURS PRN
Status: CANCELLED | OUTPATIENT
Start: 2022-04-13

## 2022-04-13 RX ADMIN — FENTANYL CITRATE 100 MCG: 50 INJECTION, SOLUTION INTRAMUSCULAR; INTRAVENOUS at 07:31

## 2022-04-13 RX ADMIN — MIDAZOLAM 2 MG: 1 INJECTION INTRAMUSCULAR; INTRAVENOUS at 07:29

## 2022-04-13 RX ADMIN — Medication 3000 MG: at 07:36

## 2022-04-13 RX ADMIN — PROPOFOL 50 MG: 10 INJECTION, EMULSION INTRAVENOUS at 07:35

## 2022-04-13 RX ADMIN — PROPOFOL 100 MCG/KG/MIN: 10 INJECTION, EMULSION INTRAVENOUS at 07:35

## 2022-04-13 RX ADMIN — SODIUM CHLORIDE: 9 INJECTION, SOLUTION INTRAVENOUS at 07:32

## 2022-04-13 ASSESSMENT — PULMONARY FUNCTION TESTS
PIF_VALUE: 20
PIF_VALUE: 0
PIF_VALUE: 20
PIF_VALUE: 0
PIF_VALUE: 21
PIF_VALUE: 0
PIF_VALUE: 0
PIF_VALUE: 22
PIF_VALUE: 0
PIF_VALUE: 19
PIF_VALUE: 0
PIF_VALUE: 23
PIF_VALUE: 20
PIF_VALUE: 20
PIF_VALUE: 0
PIF_VALUE: 0

## 2022-04-13 ASSESSMENT — PAIN DESCRIPTION - LOCATION: LOCATION: CHEST

## 2022-04-13 ASSESSMENT — PAIN DESCRIPTION - ORIENTATION: ORIENTATION: LEFT

## 2022-04-13 ASSESSMENT — PAIN DESCRIPTION - PAIN TYPE: TYPE: ACUTE PAIN

## 2022-04-13 ASSESSMENT — PAIN SCALES - GENERAL
PAINLEVEL_OUTOF10: 0
PAINLEVEL_OUTOF10: 4

## 2022-04-13 NOTE — BRIEF OP NOTE
Brief Postoperative Note      Patient: Mat Sahu  YOB: 1975  MRN: [id number]    Date of Procedure: 4/13/2022    Pre-Op Diagnosis: Left neck sternoclavicular joint mass, soft tissue, painful    Post-Op Diagnosis: Same       Procedure(s):  Excision soft tissue mass left neck/sternoclavicular joint  3.1 cm  Surgeon(s):  Llana Schilder, MD    Assistant:  First Assistant: Ivan Daniel RN    Anesthesia: Monitor Anesthesia Care    Estimated Blood Loss (mL): Minimal    Complications: None    Specimens:   ID Type Source Tests Collected by Time Destination   A : soft tissue mass left neck Tissue Neck SURGICAL PATHOLOGY Llana Schilder, MD 4/13/2022 0700        Implants:  * No implants in log *      Drains: * No LDAs found *    Findings: fatty mass    Electronically signed by Llana Schilder, MD on 4/13/2022 at 7:47 AM

## 2022-04-13 NOTE — OP NOTE
[facility]  Operative Report    PATIENT NAME: 75Mary Jane [place] RECORD NO. [id number]  SURGEON: Raúl Garcia MD   Primary Care Physician: Dino Smith MD  Date: 4/13/2022, 7:51 AM     PROCEDURE PERFORMED: Excision painful, enlarging 3.2 cm soft tissue mass overlying left sternoclavicular joint  PREOPERATIVE DIAGNOSIS:   Active Hospital Problems    Diagnosis Date Noted    Soft tissue mass [M79.89] 04/13/2022      POSTOPERATIVE DIAGNOSIS: Same, path pending  SURGEON:  Raúl Garcia MD   ANESTHESIA:  Monitored Local Anesthesia with Sedation and local  ANESTHESIA: 10 mL 1% lidocaine with epinephrine  ESTIMATED BLOOD LOSS: Less than 5 ml  SPECIMEN: Fatty soft tissue sent to pathology  COMPLICATIONS:  None; patient tolerated the procedure well. DRAINS: none  DISPOSITION: Outpatient Surgery  CONDITION: stable  Indications: See history and physical examination    Procedure:    Nicole Boucher was brought to the operating suite at the surgery center. She is placed supine on the operating table. She was given Ancef intravenously. After given appropriate sedation per anesthesia. Neck and upper chest were prepped and draped in the usual sterile fashion. Timeout was performed. Area have been confirmed by the patient and marked by myself preoperatively. It was just at the lateral aspect of her prior thyroidectomy incision. After infiltrating with local anesthetic skin incision was made dissection was carried down with cautery and fatty benign-appearing mass was removed down to the insertion of her sternocleidomastoid and the head of the clavicle. There were no additional palpable masses there was no abnormal deeper findings. She had no palpable deep adenopathy within the anterior neck. Imaging studies were negative as well. Deeper layers of tissue were reapproximated with interrupted 3-0 Vicryl suture. Skin was closed with running subcuticular absorbable 4-0 suture. Skin glue was applied.   Sponge sharp instruments were correct. Patient tolerated procedure well and was transferred to phase 2 recovery in the surgery center.

## 2022-04-13 NOTE — H&P
[facility]  History and Physical Update    Pt Name: Kenji Bailey  MRN: [id number]  YOB: 1975  Date of evaluation: 4/13/2022    [x] I have examined the patient and reviewed the H&P/Consult and there are no changes to the patient or plans. [] I have examined the patient and reviewed the H&P/Consult and have noted the following changes:        Shasha Santamaria MD   Electronically signed 4/13/2022 at 6:33 Stuart Sykes MD   General Surgery  New Patient Evaluation in Office  Pt Name: Kenji Bailey  Date of Birth 1975   Today's Date: 3/24/2022  Medical Record Number: [id number]  Referring Provider: Tiago Nesbitt MD  Primary Care Provider: Rodrigue Preston MD  Chief Complaint:       Chief Complaint   Patient presents with    Surgical Consult       New patient requesting Dr. Henry Oviedo referred Dr. Gloria Black for lump on left side neck         ASSESSMENT   1. Mass of soft tissue of neck    2. Pain of left sternoclavicular joint    3. Raspy voice, crackly scratchy voice  4. Sinusitis  5. Surgically induced hypothyroidism history of Hashimoto's disease now status post total thyroidectomy  PLANS   1. Examination of the patient indiscrete increased soft tissue over the left sternoclavicular joint. 2.  See counseling as below. Imaging reviewed outside records reviewed thyroidectomy records reviewed as well. 3.  Patient wishes to proceed with surgical exploration of soft tissue overlying left sternoclavicular joint  4. MAC anesthesia  5. Risk of procedure discussed with patient include but not limited to nondiagnostic specimen, persistent pain, persistent symptoms as well as scar. Patient wishes to proceed. Kim Marrufo is a 56y.o. year old female who is presenting today in the office for evaluation of painful area over the left sternoclavicular joint with some increased soft tissue fullness patient did not appreciate previously.   She had ultrasound of the soft tissues which showed a 1.1 x 0.7 x 1.2 cm isoechoic abnormality at the area of concern at the left sternoclavicular joint. CT scanning of the soft tissues of the neck revealed no adenopathy and no evidence of any mass. Daniel Barnett underwent a total thyroidectomy several years ago at [place] for a goiter and Hashimoto's disease. She is on thyroid replacement therapy. For the last few weeks she noted a scratchy voice for a couple of weeks. It gets better throughout out the day is actually worse in the morning. She had somewhat similar symptoms after her thyroidectomy in 2016 but these have resolved. She did see ENT Dr. Klaus Romero in [place]. His examination was unremarkable and did not show any abnormality on his oropharyngeal or neck exam.  She has no past below masses in her neck or adenopathy. She is tender over the left sternoclavicular joint with some slight subtle increased soft tissue density but this is also in the area of her incision. She completed a Medrol Dosepak is taking meloxicam but still has discomfort. ENT felt it was sternoclavicular joint pain recommended evaluation by OSVALDO DILLARD. I did offer her a bone scan to see if there was any inflammation at that joint. She was offered biopsy of the area but I felt it would be low yield and would not alleviate her symptoms. She strongly desires to proceed with surgical exploration of the area in the operating suite. Daniel Barnett denies any recent history of trauma.   She denies any fever, chills or night sweats.     Past Medical History  Past Medical History        Past Medical History:   Diagnosis Date    Headache(784.0)      Hypertension      Infertility      Nausea & vomiting 2010     with c birth    Neck mass 03/2022     left side    YIFAN on CPAP      Polycystic ovaries      PONV (postoperative nausea and vomiting)      PVC (premature ventricular contraction) 2014     seen by Dr. Flavia Young, echo done (normal) - decreased caffeine & PVC are less    Thyroid disease       Hashimotos          Past Surgical History  Past Surgical History         Past Surgical History:   Procedure Laterality Date    [facility]Y   2004     [facility]     SECTION         x2    CHOLECYSTECTOMY        [facility]    CYST REMOVAL         ovarian    DILATION AND CURETTAGE OF UTERUS   2014    HYSTEROSCOPY   2014     Dilation and Curettage    KNEE ARTHROSCOPY Left 2015     [facility]    OTHER SURGICAL HISTORY Right 2015     Right Knee Arthroscopy    SLEEVE GASTRECTOMY   10/10/2016     robotic-Dr Dorsey    THYROIDECTOMY Bilateral 2016     Dr Alber Martin TOOTH EXTRACTION Right 2013     right lower    UPPER GASTROINTESTINAL ENDOSCOPY   2016     [facility]          Medications  Current Facility-Administered Medications          Current Outpatient Medications   Medication Sig Dispense Refill    escitalopram (LEXAPRO) 20 MG tablet take 1 tablet by mouth once daily 90 tablet 3    busPIRone (BUSPAR) 7.5 MG tablet take 1 tablet by mouth twice a day 60 tablet 1    lisinopril-hydroCHLOROthiazide (PRINZIDE;ZESTORETIC) 20-25 MG per tablet take 1 tablet by mouth once daily 90 tablet 3    LORazepam (ATIVAN) 1 MG tablet Take 1 tablet by mouth 2 times daily as needed for Anxiety for up to 90 days. 45 tablet 1    lansoprazole (PREVACID) 30 MG delayed release capsule Take 1 capsule by mouth daily 30 capsule 0    levothyroxine (SYNTHROID) 75 MCG tablet 4 tablets daily        ondansetron (ZOFRAN ODT) 4 MG disintegrating tablet Take 1 tablet by mouth every 8 hours as needed for Nausea or Vomiting 45 tablet 2    butalbital-acetaminophen-caffeine (FIORICET, ESGIC) -40 MG per tablet Take 1 tablet by mouth every 4 hours as needed for Headaches or Migraine 60 tablet 3    nystatin (MYCOSTATIN) 738867 UNIT/GM powder Apply 3 times daily.  60 g 3    vitamin B-12 (CYANOCOBALAMIN) 1000 MCG tablet Take 1,000 mcg by mouth daily        Cholecalciferol (VITAMIN D3) 87400 UNITS CAPS Take 1 capsule by mouth once a week (Patient taking differently: Take 1 capsule by mouth daily ) 4 capsule 1    calcium carbonate (OSCAL) 500 MG TABS tablet Take 500 mg by mouth 3 times daily        multivitamin (THERAGRAN) per tablet Take 1 tablet by mouth daily.          No current facility-administered medications for this visit.         Allergies   No Known Allergies    Family History  Family History         Family History   Problem Relation Age of Onset    Hypertension Mother     [facility] Arthritis Mother      Other Mother           at [facility]    Obesity Father      Lung Cancer Father      Obesity Brother      Cancer Brother 39         Downey sarcoma    No Known Problems Maternal Grandmother      No Known Problems Maternal Grandfather      No Known Problems Paternal Grandmother      Heart Attack Paternal Grandfather            SocialHistory  Social History               Socioeconomic History    Marital status:        Spouse name: Not on file    Number of children: Not on file    Years of education: Not on file    Highest education level: Not on file   Occupational History    Not on file   Tobacco Use    Smoking status: Never Smoker    Smokeless tobacco: Never Used   Substance and Sexual Activity    Alcohol use: Yes       Alcohol/week: 0.0 standard drinks       Comment: social    Drug use: No    Sexual activity: Yes   Other Topics Concern    Not on file   Social History Narrative    Not on file      Social Determinants of Health          Financial Resource Strain: Low Risk     Difficulty of Paying Living Expenses: Not hard at all   Food Insecurity: No Food Insecurity    Worried About Running Out of Food in the Last Year: Never true    [address] in the Last Year: Never true   Transportation Needs: No Transportation Needs    Lack of Transportation (Medical): No    Lack of Transportation (Non-Medical):  No   Physical Activity:     Days of Exercise per Week: Not on file    Minutes of Exercise per Session: Not on file   Stress:     Feeling of Stress : Not on file   Social Connections:     Frequency of Communication with Friends and Family: Not on file    Frequency of Social Gatherings with Friends and Family: Not on file    Attends Hoahaoism Services: Not on file    Active Member of Clubs or Organizations: Not on file    Attends Club or Organization Meetings: Not on file    Marital Status: Not on file   Intimate Partner Violence:     Fear of Current or Ex-Partner: Not on file    Emotionally Abused: Not on file    Physically Abused: Not on file    Sexually Abused: Not on file   Housing Stability:     Unable to Pay for Housing in the Last Year: Not on file    Number of [place] in the Last Year: Not on file    Unstable Housing in the Last Year: Not on file              Review of Systems  Review of Systems   Constitutional: Positive for fatigue. Negative for chills, fever and unexpected weight change. HENT: Positive for voice change. Negative for sore throat and trouble swallowing. Eyes: Negative for pain and visual disturbance. Glasses   Respiratory: Negative for cough, shortness of breath and wheezing. Cardiovascular: Negative for chest pain and palpitations. Gastrointestinal: Negative for abdominal pain, blood in stool, nausea and vomiting. Endocrine: Negative for cold intolerance, heat intolerance and polydipsia. Genitourinary: Negative for dysuria, flank pain and hematuria. Musculoskeletal: Positive for neck pain. Negative for gait problem, joint swelling and myalgias. Skin: Negative for color change and rash. Allergic/Immunologic: Negative for immunocompromised state. Neurological: Negative for dizziness, tremors and headaches. Hematological: Negative for adenopathy. Does not bruise/bleed easily.    Psychiatric/Behavioral: Negative for behavioral problems and confusion.         OBJECTIVE      /62 (Site: Right Upper Arm, Position: Sitting, Cuff Size: Medium Adult)   Pulse 77   Temp 96.7 °F (35.9 °C) (Temporal)   Resp 18   Ht 5' 7\" (1.702 m)   Wt (!) 331 lb (150.1 kg)   SpO2 93%   BMI 51.84 kg/m²       Physical Exam  Vitals reviewed. Constitutional:       Appearance: She is well-developed. She is not ill-appearing. Comments: bmi 46   HENT:      Head: Normocephalic and atraumatic. Comments: glasses     Nose: No congestion. Eyes:      General: No scleral icterus. Extraocular Movements: Extraocular movements intact. Pupils: Pupils are equal, round, and reactive to light. Neck:      Thyroid: No thyromegaly. Vascular: No JVD. Trachea: No tracheal deviation. Comments: Raspy voice  Cardiovascular:      Rate and Rhythm: Normal rate and regular rhythm. Heart sounds: Normal heart sounds. Pulmonary:      Effort: No respiratory distress. Breath sounds: Normal breath sounds. No wheezing. Abdominal:      General: Bowel sounds are normal. There is no distension. Palpations: Abdomen is soft. Tenderness: There is no abdominal tenderness. There is no guarding or rebound. Musculoskeletal:         General: No deformity. Cervical back: Neck supple. Tenderness present. No rigidity. Lymphadenopathy:      Cervical: No cervical adenopathy. Right cervical: No superficial, deep or posterior cervical adenopathy. Left cervical: No superficial or deep cervical adenopathy. Skin:     General: Skin is warm and dry. Coloration: Skin is not jaundiced or pale. Findings: No rash. Neurological:      General: No focal deficit present. Mental Status: She is alert and oriented to person, place, and time. Cranial Nerves: No cranial nerve deficit. Psychiatric:         Mood and Affect: Mood normal.         Thought Content:  Thought content normal.                  Lab Results   Component Value Date     WBC 8.4 03/08/2022     HGB 13.3 03/08/2022     HCT 41.4 03/08/2022      03/08/2022     CHOL 142 05/02/2019     TRIG 58 05/02/2019     HDL 56 05/02/2019     ALT 19 01/10/2022     AST 16 01/10/2022      01/10/2022     K 3.6 01/10/2022      01/10/2022     CREATININE 0.8 01/10/2022     BUN 13 01/10/2022     CO2 29 01/10/2022     TSH 9.580 (H) 10/25/2021     INR 1.03 10/18/2019     LABA1C 4.9 10/05/2017         Narrative   PROCEDURE: CT SOFT TISSUE NECK W CONTRAST       CLINICAL INFORMATION: left sided neck nodule. Difficulty swallowing while supine.       COMPARISON: Neck ultrasound dated 3/8/2022 and CT neck dated 9/26/2016.       TECHNIQUE: 3 mm axial images were obtained through the neck soft tissues after administration of 70 mL  injected in the right AC with sagittal and coronal reconstructions.        All CT scans at this facility use dose modulation, iterative reconstruction, and/or weight-based dosing when appropriate to reduce radiation dose to as low as reasonably achievable.       FINDINGS:   There is streak artifact from dental amalgam which partially obscures oral and perioral soft tissues. There is medialization of the left internal carotid artery with partial retropharyngeal course with associated mild asymmetry of the mucosa at the left    oropharynx. Vocal cords are closely apposed limiting evaluation of the larynx. Given these caveats, mucosal surfaces of the aerodigestive tract are symmetric without focal nodular thickening or visualized mass. There are scattered small cervical chain    lymph nodes bilaterally, similar to prior CT without definite cervical lymphadenopathy. There are stable small intraparotid lymph nodes are parotid glands are fatty replaced, similar to prior exam. Submandibular glands are unremarkable. The thyroid gland    appears to be surgically absent. No mass is identified subjacent to the BB marker at the anterior inferior neck. However, there is apparent bandlike thickening of the epidermis of the neck subjacent to the BB marker.     Visualized intracranial contents are grossly unremarkable. Orbits are unremarkable. The right maxillary sinus is completely opacified. There is a mucous retention cyst in the left maxillary sinus. There is mild mucosal thickening in the ethmoid air    cells. Mastoid air cells are clear. Great vessels of the neck appear patent. Visualized portions of the lungs are clear. There are mild-to-moderate degenerative changes of the cervical spine. No suspicious osseous lesions are identified.           Impression       1. No mass or adenopathy identified. 2. Apparent thickening of the epidermis at the anterior aspect of the neck inferiorly at the level of the BB marker. 3. Prominent right antral sinusitis.                 **This report has been created using voice recognition software. It may contain minor errors which are inherent in voice recognition technology. **       Final report electronically signed by Dr. Aaliyah Miller MD on 3/9/2022 4:49 PM              PROCEDURE: US HEAD NECK SOFT TISSUE THYROID       CLINICAL INFORMATION: Lump on neck       COMPARISON: CTA chest 1/10/2022       TECHNIQUE: Focused ultrasound of the region of concern at the sternoclavicular region. Grayscale and color Doppler imaging was performed           FINDINGS:        A 1.1 x 0.7 x 1.2 cm isoechoic abnormality with ill-defined borders is seen at the area of concern at the left sternoclavicular joint               Impression   1. A 1.1 x 0.7 x 1.2 cm isoechoic abnormality is seen at the area of concern at the left sternoclavicular joint. This is not well characterized ultrasound. Further workup may be necessary as clinically warranted                       **This report has been created using voice recognition software.  It may contain minor errors which are inherent in voice recognition technology. **       Final report electronically signed by Dr Petra Arita on 3/8/2022 1:48 PM

## 2022-04-13 NOTE — ANESTHESIA PRE PROCEDURE
Department of Anesthesiology  Preprocedure Note       Name:  Adriana Leonard   Age:  56 y.o.  :  1975                                          MRN:  [id number]         Date:  2022      Surgeon: Soraya Beck):  Jai Brown MD    Procedure: Procedure(s):  Excision soft tissue mass left neck/sternoclavicular joint    Medications prior to admission:   Prior to Admission medications    Medication Sig Start Date End Date Taking? Authorizing Provider   traMADol (ULTRAM) 50 MG tablet Take 50 mg by mouth every 6 hours as needed for Pain. Yes Historical Provider, MD   escitalopram (LEXAPRO) 20 MG tablet take 1 tablet by mouth once daily 3/15/22   Sky Alexis MD   busPIRone (BUSPAR) 7.5 MG tablet take 1 tablet by mouth twice a day 3/15/22   Sky Alexis MD   lisinopril-hydroCHLOROthiazide (PRINZIDE;ZESTORETIC) 20-25 MG per tablet take 1 tablet by mouth once daily 3/15/22   Sky Alexis MD   LORazepam (ATIVAN) 1 MG tablet Take 1 tablet by mouth 2 times daily as needed for Anxiety for up to 90 days. 3/10/22 6/8/22  Sky Alexis MD   lansoprazole (PREVACID) 30 MG delayed release capsule Take 1 capsule by mouth daily 3/9/22   Didier Sanchez MD   levothyroxine (SYNTHROID) 75 MCG tablet 4 tablets daily 10/8/21   Historical Provider, MD   ondansetron (ZOFRAN ODT) 4 MG disintegrating tablet Take 1 tablet by mouth every 8 hours as needed for Nausea or Vomiting 10/13/21   Sky Alexis MD   butalbital-acetaminophen-caffeine (FIORICET, ESGIC) -19 MG per tablet Take 1 tablet by mouth every 4 hours as needed for Headaches or Migraine 10/13/21   Sky Alexis MD   nystatin (MYCOSTATIN) 615064 UNIT/GM powder Apply 3 times daily.  5/10/21   Sky Alexis MD   vitamin B-12 (CYANOCOBALAMIN) 1000 MCG tablet Take 1,000 mcg by mouth daily    Historical Provider, MD   Cholecalciferol (VITAMIN D3) 65748 UNITS CAPS Take 1 capsule by mouth once a week  Patient taking differently: Take 1 capsule by mouth daily  17   Prince Chaudhari VINH [place]   calcium carbonate (OSCAL) 500 MG TABS tablet Take 500 mg by mouth 3 times daily    Historical Provider, MD   multivitamin [facility]) per tablet Take 1 tablet by mouth daily. Historical Provider, MD       Current medications:    Current Facility-Administered Medications   Medication Dose Route Frequency Provider Last Rate Last Admin    0.9 % sodium chloride infusion   IntraVENous Continuous Ivy Fletcher LPN        ceFAZolin (ANCEF) 3000 mg in dextrose 5 % 100 mL IVPB  3,000 mg IntraVENous 30 Min Pre-Op Ivy Fletcher LPN           Allergies:  No Known Allergies    Problem List:    Patient Active Problem List   Diagnosis Code    Morning headache R51.9    Hypertension I10    Shifting sleep-work schedule G47.26    Acute medial meniscal injury of right knee S83.8X1A    Morbid obesity due to excess calories (HCC) E66.01    S/P laparoscopic sleeve gastrectomy Z98.84    Acquired hypothyroidism E03.9    PCO (polycystic ovaries) E28.2    Concussion S06. 0X7A    Thyroid goiter E04.9    Migraine without status migrainosus, not intractable G43.909       Past Medical History:        Diagnosis Date    Headache(784.0)     Hypertension     Infertility     Nausea & vomiting     with c birth    Neck mass 2022    left side    YIFAN on CPAP     Polycystic ovaries     PONV (postoperative nausea and vomiting)     PVC (premature ventricular contraction)     seen by Dr. Estela Sosa, echo done (normal) - decreased caffeine & PVC are less    Thyroid disease     Hashimotos       Past Surgical History:        Procedure Laterality Date    APPENDECTOMY  2004    [place]     SECTION      x2    CHOLECYSTECTOMY      [place]    CYST REMOVAL      ovarian    DILATION AND CURETTAGE OF UTERUS  2014    HYSTEROSCOPY  2014    Dilation and Curettage    KNEE ARTHROSCOPY Left     [facility]    OTHER SURGICAL HISTORY Right 2015    Right Knee Arthroscopy    SLEEVE GASTRECTOMY 10/10/2016    robotic-Dr Dorsey    THYROIDECTOMY Bilateral 09/2016    Dr Jemima Hadley    TOOTH EXTRACTION Right 05/30/2013    right lower    [facility]       Social History:    Social History     Tobacco Use    Smoking status: Never Smoker    Smokeless tobacco: Never Used   Substance Use Topics    Alcohol use:  Yes     Alcohol/week: 0.0 standard drinks     Comment: social                                Counseling given: Not Answered      Vital Signs (Current):   Vitals:    04/13/22 0659   BP: (!) 146/70   Pulse: 61   Resp: 20   Temp: 98.3 °F (36.8 °C)   TempSrc: Temporal   SpO2: 96%                                              BP Readings from Last 3 Encounters:   04/13/22 (!) 146/70   04/13/22 134/61   04/03/22 (!) 140/87       NPO Status: Time of last liquid consumption: 0500 (sip with  medication)                        Time of last solid consumption: 1900                        Date of last liquid consumption: 04/13/22                        Date of last solid food consumption: 04/12/22    BMI:   Wt Readings from Last 3 Encounters:   04/03/22 (!) 318 lb (144.2 kg)   03/24/22 (!) 331 lb (150.1 kg)   03/08/22 (!) 334 lb (151.5 kg)     There is no height or weight on file to calculate BMI.    CBC:   Lab Results   Component Value Date    WBC 8.4 03/08/2022    RBC 4.44 03/08/2022    RBC 3.31 06/01/2012    HGB 13.3 03/08/2022    HCT 41.4 03/08/2022    MCV 93.2 03/08/2022    RDW 12.8 01/10/2022     03/08/2022       CMP:   Lab Results   Component Value Date     04/11/2022    K 4.1 04/11/2022    K 4.2 03/10/2018     04/11/2022    CO2 30 04/11/2022    BUN 11 04/11/2022    CREATININE 0.5 04/11/2022    LABGLOM >90 04/11/2022    GLUCOSE 83 04/11/2022    GLUCOSE 87 05/12/2012    PROT 6.6 01/10/2022    CALCIUM 9.2 04/11/2022    BILITOT 0.3 01/10/2022    BILITOT NEGATIVE 08/23/2011    ALKPHOS 66 01/10/2022    AST 16 01/10/2022    ALT 19 01/10/2022       POC Tests: No results for input(s): POCGLU, POCNA, POCK, POCCL, POCBUN, POCHEMO, POCHCT in the last 72 hours. Coags:   Lab Results   Component Value Date    INR 1.03 10/18/2019    APTT 32.0 02/08/2016       HCG (If Applicable):   Lab Results   Component Value Date    PREGTESTUR negative 04/13/2022        ABGs: No results found for: PHART, PO2ART, VPT6KIU, PTG6IUB, BEART, Y9GBNFYS     Type & Screen (If Applicable):  Lab Results   Component Value Date    LABRH POS 05/29/2012       Drug/Infectious Status (If Applicable):  No results found for: HIV, HEPCAB    COVID-19 Screening (If Applicable): No results found for: COVID19        Anesthesia Evaluation  Patient summary reviewed and Nursing notes reviewed   history of anesthetic complications: PONV. Airway: Mallampati: II  TM distance: >3 FB   Neck ROM: full  Mouth opening: > = 3 FB Dental:          Pulmonary:normal exam  breath sounds clear to auscultation  (+) sleep apnea: on CPAP,                             Cardiovascular:  Exercise tolerance: good (>4 METS),   (+) hypertension:,                   Neuro/Psych:   (+) headaches:,             GI/Hepatic/Renal:   (+) morbid obesity          Endo/Other:    (+) hypothyroidism::., .          Pt had no PAT visit       Abdominal:   (+) obese,     Abdomen: soft. Vascular: negative vascular ROS. Other Findings:             Anesthesia Plan      MAC     ASA 3       Induction: intravenous. Anesthetic plan and risks discussed with patient and sibling. Plan discussed with CRNA.                   Vance Arreola DO   4/13/2022

## 2022-04-13 NOTE — ANESTHESIA POSTPROCEDURE EVALUATION
Department of Anesthesiology  Postprocedure Note    Patient: Nate Reece  MRN: [id number]  YOB: 1975  Date of evaluation: 4/13/2022  Time:  8:26 AM     Procedure Summary     Date: 04/13/22 Room / Location: [address] [place] [address] / Elvin Ambrocio    Anesthesia Start: 0220 Anesthesia Stop: 3583    Procedure: Excision soft tissue mass left neck/sternoclavicular joint (Left Neck) Diagnosis: (Left neck sternoclavicular joint mass)    Surgeons: Blu Walsh MD Responsible Provider: Eliseo Sweet DO    Anesthesia Type: MAC ASA Status: 3          Anesthesia Type: MAC    Raina Phase I:      Raina Phase II: Raina Score: 9    Last vitals: Reviewed and per EMR flowsheets.        Anesthesia Post Evaluation    Patient location during evaluation: bedside  Patient participation: complete - patient participated  Level of consciousness: awake and alert  Pain score: 0  Airway patency: patent  Nausea & Vomiting: no nausea and no vomiting  Complications: no  Cardiovascular status: hemodynamically stable and blood pressure returned to baseline  Respiratory status: spontaneous ventilation, room air and acceptable  Hydration status: stable

## 2022-04-13 NOTE — PROGRESS NOTES
8400-  Patient arrived to phase II via chair. Spontaneous respirations even and unlabored. Placed on monitor--VSS. Report received from [facility].    1244-  Assessment completed. Patient is drowsy, but responds to name and follows commands. IV removed-- no complications. Patient denies pain--will monitor. Surgical site to left chest closed with surgical glue-- intact. 1101-  Patient denies snack or drink. Family in waiting room, prefers to stay out there until ready for discharge. 2514-  All belongings in room. Osvaldo.Jaye-  Patient waking up. Snack and drink given to patient. Family brought to room. 0395-  IV removed-- no complications. Bandage applied. 3152-  Patient dressing. 0900-  Discharge instructions given. Understanding verbalized. 9202-  Patient discharged in stable condition with all belongings. This RN walked patient to car.

## 2022-04-14 ENCOUNTER — TELEPHONE (OUTPATIENT)
Dept: SURGERY | Age: 47
End: 2022-04-14

## 2022-04-14 NOTE — TELEPHONE ENCOUNTER
Pt states she is feeling well this morning after surgery. States she is a little sore, but is applying ice to the area as needed. Pt states she is urinating with no issues, and will take stool softeners/laxatives if needed for BM. Pt states that incisions are clean, dry and intact- denies any s/sx of infection. Advised to call the office with any questions or concerns.

## 2022-04-14 NOTE — TELEPHONE ENCOUNTER
----- Message from Scott Nash MD sent at 4/14/2022 12:18 PM EDT -----  Path benign  ----- Message -----  From: Lynnwood Sacks Incoming Lab Results From Soft  Sent: 4/14/2022  11:26 AM EDT  To: Scott Nash MD

## 2022-04-25 ENCOUNTER — OFFICE VISIT (OUTPATIENT)
Dept: SURGERY | Age: 47
End: 2022-04-25

## 2022-04-25 VITALS
SYSTOLIC BLOOD PRESSURE: 132 MMHG | BODY MASS INDEX: 43.4 KG/M2 | DIASTOLIC BLOOD PRESSURE: 64 MMHG | RESPIRATION RATE: 18 BRPM | HEIGHT: 69 IN | HEART RATE: 65 BPM | OXYGEN SATURATION: 98 % | TEMPERATURE: 97.5 F | WEIGHT: 293 LBS

## 2022-04-25 DIAGNOSIS — M79.89 MASS OF SOFT TISSUE OF NECK: Primary | ICD-10-CM

## 2022-04-25 PROCEDURE — 99024 POSTOP FOLLOW-UP VISIT: CPT | Performed by: SURGERY

## 2022-04-27 NOTE — PROGRESS NOTES
Dorota Schwarz MD   General Surgery  Postprocedure Evaluation in Office  Pt Name: Kelly Benavides  Date of Birth 1975   Today's Date: 4/25/2022  Medical Record Number: [id number]  Primary Care Provider: Brett Mack MD  Chief Complaint   Patient presents with   Cherie Nagy Post-Op Check     s/p excision painful, enlarging 3.2 cm soft tissue mass overlying left sternoclavicular joint-4/13/2022     ASSESSMENT      1. Mass of soft tissue of neck    2. Postop     PLAN       1. Pathology discussed with patient benign. She was reassured. No evidence of any malignancy. 2.  If patient with persistent symptoms recommend consider bone scan with PCP. Dez Weiss is seen today for post-op follow-up. She is status post excision of painful subcutaneous nodule overlying the left sternoclavicular joint. Benign tissue removed. Tissue was removed down to the clavicle itself. No bony abnormality. She did report recent shoulder injury since surgery and may need  surgery for repair. Surgical incision healing well. Medications    Current Outpatient Medications:     traMADol (ULTRAM) 50 MG tablet, Take 50 mg by mouth every 6 hours as needed for Pain., Disp: , Rfl:     escitalopram (LEXAPRO) 20 MG tablet, take 1 tablet by mouth once daily, Disp: 90 tablet, Rfl: 3    busPIRone (BUSPAR) 7.5 MG tablet, take 1 tablet by mouth twice a day, Disp: 60 tablet, Rfl: 1    lisinopril-hydroCHLOROthiazide (PRINZIDE;ZESTORETIC) 20-25 MG per tablet, take 1 tablet by mouth once daily, Disp: 90 tablet, Rfl: 3    LORazepam (ATIVAN) 1 MG tablet, Take 1 tablet by mouth 2 times daily as needed for Anxiety for up to 90 days. , Disp: 45 tablet, Rfl: 1    lansoprazole (PREVACID) 30 MG delayed release capsule, Take 1 capsule by mouth daily, Disp: 30 capsule, Rfl: 0    levothyroxine (SYNTHROID) 75 MCG tablet, 4 tablets daily, Disp: , Rfl:     ondansetron (ZOFRAN ODT) 4 MG disintegrating tablet, Take 1 tablet by mouth every 8 hours as needed for Nausea or Vomiting, Disp: 45 tablet, Rfl: 2    butalbital-acetaminophen-caffeine (FIORICET, ESGIC) -40 MG per tablet, Take 1 tablet by mouth every 4 hours as needed for Headaches or Migraine, Disp: 60 tablet, Rfl: 3    nystatin (MYCOSTATIN) 234922 UNIT/GM powder, Apply 3 times daily. , Disp: 60 g, Rfl: 3    vitamin B-12 (CYANOCOBALAMIN) 1000 MCG tablet, Take 1,000 mcg by mouth daily, Disp: , Rfl:     Cholecalciferol (VITAMIN D3) 56540 UNITS CAPS, Take 1 capsule by mouth once a week (Patient taking differently: Take 1 capsule by mouth daily ), Disp: 4 capsule, Rfl: 1    calcium carbonate (OSCAL) 500 MG TABS tablet, Take 500 mg by mouth 3 times daily, Disp: , Rfl:     multivitamin (THERAGRAN) per tablet, Take 1 tablet by mouth daily. , Disp: , Rfl:     Allergies  No Known Allergies    Review of Systems  History obtained from the patient. Constitutional: Denies any fever, chills, fatigue. Wound: Denies any rash, skin color changes or wound problems. Resp: Denies any cough, shortness of breath. CV: Denies any chest pain, orthopnea or syncope. OBJECTIVE     VITALS: /64 (Site: Right Lower Arm)   Pulse 65   Temp 97.5 °F (36.4 °C) (Temporal)   Resp 18   Ht 5' 9\" (1.753 m)   Wt (!) 331 lb (150.1 kg)   SpO2 98%   BMI 48.88 kg/m²     CONSTITUTIONAL: Alert and oriented times 3, no acute distress and cooperative to examination. SKIN: Skin color, texture, turgor normal. No rashes or lesions. INCISION: wound margins intact and healing well. No signs of infection. No drainage. LUNGS: Lungs Clear  CARDIOVASCULAR: Normal Rate  .    Performed by: [facility]   [place], [place]                     [id number]   Assoc.                                              Page 1 of 4 [phone] [place] [id number]                                                       PROC: 04/13/2022   [facility]                                    RECV: 04/13/2022   [address]                                     RPTD: 2022   LEANDRO NAQVI II.[place], [place] [address]                       MRN:  [id number]     LOC: ASOR                       ACCT: [de-identified]  SEX: F                       : 1975  AGE: 56 Y                          PATHOLOGY REPORT                       ATTN: NELIDA Leon                  REQ: Laura Lowry OLMONE       Copies To:   SCOTT PARUL       Clinical Information: LEFT NECK STERNOCLAVICULAR JOINT MASS     FINAL DIAGNOSIS:   Soft tissue, left neck, mass, excision:             Benign fibroadipose tissue with organizing hematoma.    Focal skeletal muscle. Specimen:   SOFT TISSUE, LEFT NECK       Gross Examination:   The container is labeled Lencho Arreola, soft tissue mass, left neck. Received in formalin is an unoriented fragment of yellow adipose tissue   measuring 2.7 x about 2 x 1 cm.  There is focal surface hemorrhage. Sections through the specimen reveal hemorrhage. Gallo Fail is no necrosis.    There is no capsule.  Representative sections are submitted in two   cassettes.  ss.  SIO/DKR:[id number]     Microscopic Examination:   Sections demonstrate fibroadipose tissue with organizing hematoma. There is scant skeletal muscle.   There is no mass identified. Clinical correlation is recommended. Kimberly Page                                        <Sign Out Dr. Cristina Le M.D., F.C.A.P.        [facility]  Printed on:  2022   Patrick RamirezFreida   Original print date: 2022      Specimen Collected: 22 08:37 Last Resulted: 22 11:11

## 2022-05-02 ENCOUNTER — OFFICE VISIT (OUTPATIENT)
Dept: FAMILY MEDICINE CLINIC | Age: 47
End: 2022-05-02
Payer: COMMERCIAL

## 2022-05-02 VITALS
HEART RATE: 86 BPM | WEIGHT: 293 LBS | TEMPERATURE: 97.2 F | DIASTOLIC BLOOD PRESSURE: 88 MMHG | HEIGHT: 69 IN | SYSTOLIC BLOOD PRESSURE: 136 MMHG | OXYGEN SATURATION: 99 % | BODY MASS INDEX: 43.4 KG/M2 | RESPIRATION RATE: 18 BRPM

## 2022-05-02 DIAGNOSIS — F41.1 GENERALIZED ANXIETY DISORDER: ICD-10-CM

## 2022-05-02 DIAGNOSIS — S46.012D TRAUMATIC COMPLETE TEAR OF LEFT ROTATOR CUFF, SUBSEQUENT ENCOUNTER: ICD-10-CM

## 2022-05-02 DIAGNOSIS — M79.602 LEFT ARM PAIN: Primary | ICD-10-CM

## 2022-05-02 DIAGNOSIS — R11.0 NAUSEA: ICD-10-CM

## 2022-05-02 PROCEDURE — 99214 OFFICE O/P EST MOD 30 MIN: CPT | Performed by: FAMILY MEDICINE

## 2022-05-02 PROCEDURE — G8417 CALC BMI ABV UP PARAM F/U: HCPCS | Performed by: FAMILY MEDICINE

## 2022-05-02 PROCEDURE — G8427 DOCREV CUR MEDS BY ELIG CLIN: HCPCS | Performed by: FAMILY MEDICINE

## 2022-05-02 PROCEDURE — 1036F TOBACCO NON-USER: CPT | Performed by: FAMILY MEDICINE

## 2022-05-02 RX ORDER — ONDANSETRON 4 MG/1
4 TABLET, ORALLY DISINTEGRATING ORAL EVERY 8 HOURS PRN
Qty: 45 TABLET | Refills: 2 | Status: SHIPPED | OUTPATIENT
Start: 2022-05-02 | End: 2022-09-26

## 2022-05-02 RX ORDER — TRAMADOL HYDROCHLORIDE 50 MG/1
50 TABLET ORAL EVERY 6 HOURS PRN
Qty: 15 TABLET | Refills: 0 | Status: SHIPPED | OUTPATIENT
Start: 2022-05-02 | End: 2022-05-09

## 2022-05-02 RX ORDER — TRAMADOL HYDROCHLORIDE 50 MG/1
50 TABLET ORAL EVERY 8 HOURS PRN
Qty: 15 TABLET | Refills: 0 | Status: CANCELLED | OUTPATIENT
Start: 2022-05-02 | End: 2022-05-07

## 2022-05-02 ASSESSMENT — ENCOUNTER SYMPTOMS
SHORTNESS OF BREATH: 0
RHINORRHEA: 0
WHEEZING: 0
DIARRHEA: 0
SORE THROAT: 0
ABDOMINAL PAIN: 0
COUGH: 0
NAUSEA: 1
CONSTIPATION: 0

## 2022-05-02 NOTE — PROGRESS NOTES
[address] [place] [address] DR. [place] [address]  Dept: [phone]  Loc: [address] (:  1975) is a 56 y.o. female, here for evaluation of the following chief complaint(s):  6 Month Follow-Up (rotator cuff surgery )      ASSESSMENT/PLAN:  1. Left arm pain  -     XR RADIUS ULNA LEFT (2 VIEWS); Future  -     traMADol (ULTRAM) 50 MG tablet; Take 1 tablet by mouth every 6 hours as needed for Pain for up to 7 days. , Disp-15 tablet, R-0Normal  2. Traumatic complete tear of left rotator cuff, subsequent encounter  -     traMADol (ULTRAM) 50 MG tablet; Take 1 tablet by mouth every 6 hours as needed for Pain for up to 7 days. , Disp-15 tablet, R-0Normal  3. Generalized anxiety disorder  4. Nausea  -     ondansetron (ZOFRAN ODT) 4 MG disintegrating tablet; Take 1 tablet by mouth every 8 hours as needed for Nausea or Vomiting, Disp-45 tablet, R-2Normal    Xray left arm due to persistent swelling  Tramadol for rotator cuff pain- follows with ortho, surgery next week  Cont lexapro and buspar and ativan for anxitey  zofran prn  No follow-ups on file. SUBJECTIVE/OBJECTIVE:  HPI  Presents for recheck. She states that she had the mass from her neck removed and this was benign which was good. After that procedure she was playing outside and tripped over a step and ended up tearing her left rotator cuff. She is scheduled for surgery next week. She was given tramadol to help with pain but this is now out. She states that she put a call into her orthopedist last Wednesday but has not heard back yet. She wonders if she could get a few tramadol to help until her surgery next week. She also mentions that her left forearm is hurting her lately. States that she never had this x-rayed but she has swelling and persistent point tenderness. She would like an x-ray to rule out fracture.   She is still feeling extremely anxious but states that it is because she has a lot going on. She thinks that the BuSpar and Lexapro as well as occasional Ativan are helping. She does not want to change medications at this time. Review of Systems   Constitutional: Positive for activity change and fatigue. Negative for chills and fever. HENT: Negative for congestion, rhinorrhea and sore throat. Respiratory: Negative for cough, shortness of breath and wheezing. Cardiovascular: Negative for chest pain and palpitations. Gastrointestinal: Positive for nausea. Negative for abdominal pain, constipation and diarrhea. Genitourinary: Negative for dysuria and hematuria. Musculoskeletal: Positive for arthralgias and joint swelling. Negative for myalgias. Neurological: Negative for dizziness and headaches. Psychiatric/Behavioral: Positive for sleep disturbance. The patient is nervous/anxious. Physical Exam  Vitals and nursing note reviewed. Constitutional:       Appearance: She is well-developed. HENT:      Head: Normocephalic and atraumatic. Eyes:      General: No scleral icterus. Right eye: No discharge. Left eye: No discharge. Conjunctiva/sclera: Conjunctivae normal.   Cardiovascular:      Rate and Rhythm: Normal rate and regular rhythm. Heart sounds: Normal heart sounds. Pulmonary:      Effort: Pulmonary effort is normal.      Breath sounds: Normal breath sounds. No wheezing. Musculoskeletal:      Left forearm: Swelling, tenderness and bony tenderness present. Arms:    Skin:     General: Skin is warm and dry. Neurological:      Mental Status: She is alert and oriented to person, place, and time. Mental status is at baseline. Psychiatric:         Behavior: Behavior normal.         Thought Content:  Thought content normal.         Judgment: Judgment normal.         Vitals:    05/02/22 1511   BP: 136/88   Pulse: 86   Resp: 18   Temp: 97.2 °F (36.2 °C)   SpO2: 99%              An electronic signature was used to authenticate this note.     --Al Ndiaye MD

## 2022-05-08 ENCOUNTER — HOSPITAL ENCOUNTER (OUTPATIENT)
Age: 47
Discharge: HOME | End: 2022-05-08
Payer: COMMERCIAL

## 2022-05-08 ENCOUNTER — HOSPITAL ENCOUNTER (OUTPATIENT)
Dept: GENERAL RADIOLOGY | Age: 47
Discharge: HOME | End: 2022-05-08
Payer: COMMERCIAL

## 2022-05-08 DIAGNOSIS — M79.602 LEFT ARM PAIN: ICD-10-CM

## 2022-05-08 PROCEDURE — 85025 COMPLETE CBC W/AUTO DIFF WBC: CPT

## 2022-05-08 PROCEDURE — 80048 BASIC METABOLIC PNL TOTAL CA: CPT

## 2022-05-08 PROCEDURE — 36415 COLL VENOUS BLD VENIPUNCTURE: CPT

## 2022-05-08 PROCEDURE — 73090 X-RAY EXAM OF FOREARM: CPT

## 2022-05-09 ENCOUNTER — HOSPITAL ENCOUNTER (OUTPATIENT)
Age: 47
Discharge: HOME | End: 2022-05-09
Payer: COMMERCIAL

## 2022-05-09 LAB
ANION GAP SERPL CALCULATED.3IONS-SCNC: 12 MEQ/L (ref 8–16)
BASOPHILS # BLD: 0.5 %
BASOPHILS ABSOLUTE: 0.1 THOU/MM3 (ref 0–0.1)
BUN BLDV-MCNC: 13 MG/DL (ref 7–22)
CALCIUM SERPL-MCNC: 9 MG/DL (ref 8.5–10.5)
CHLORIDE BLD-SCNC: 103 MEQ/L (ref 98–111)
CO2: 26 MEQ/L (ref 23–33)
CREAT SERPL-MCNC: 0.6 MG/DL (ref 0.4–1.2)
EOSINOPHIL # BLD: 2.7 %
EOSINOPHILS ABSOLUTE: 0.3 THOU/MM3 (ref 0–0.4)
ERYTHROCYTE [DISTWIDTH] IN BLOOD BY AUTOMATED COUNT: 14.1 % (ref 11.5–14.5)
ERYTHROCYTE [DISTWIDTH] IN BLOOD BY AUTOMATED COUNT: 50.9 FL (ref 35–45)
GFR SERPL CREATININE-BSD FRML MDRD: > 90 ML/MIN/1.73M2
GLUCOSE BLD-MCNC: 88 MG/DL (ref 70–108)
HCT VFR BLD CALC: 42.8 % (ref 37–47)
HEMOGLOBIN: 13.3 GM/DL (ref 12–16)
IMMATURE GRANS (ABS): 0.01 THOU/MM3 (ref 0–0.07)
IMMATURE GRANULOCYTES: 0.1 %
LYMPHOCYTES # BLD: 27.5 %
LYMPHOCYTES ABSOLUTE: 2.9 THOU/MM3 (ref 1–4.8)
MCH RBC QN AUTO: 30.4 PG (ref 26–33)
MCHC RBC AUTO-ENTMCNC: 31.1 GM/DL (ref 32.2–35.5)
MCV RBC AUTO: 97.9 FL (ref 81–99)
MONOCYTES # BLD: 7.4 %
MONOCYTES ABSOLUTE: 0.8 THOU/MM3 (ref 0.4–1.3)
NUCLEATED RED BLOOD CELLS: 0 /100 WBC
PLATELET # BLD: 296 THOU/MM3 (ref 130–400)
PMV BLD AUTO: 10 FL (ref 9.4–12.4)
POTASSIUM SERPL-SCNC: 3.9 MEQ/L (ref 3.5–5.2)
RBC # BLD: 4.37 MILL/MM3 (ref 4.2–5.4)
SEG NEUTROPHILS: 61.8 %
SEGMENTED NEUTROPHILS ABSOLUTE COUNT: 6.5 THOU/MM3 (ref 1.8–7.7)
SODIUM BLD-SCNC: 141 MEQ/L (ref 135–145)
WBC # BLD: 10.5 THOU/MM3 (ref 4.8–10.8)

## 2022-05-09 PROCEDURE — 87641 MR-STAPH DNA AMP PROBE: CPT

## 2022-05-10 LAB — MRSA SCREEN RT-PCR: NEGATIVE

## 2022-05-24 DIAGNOSIS — F41.1 GENERALIZED ANXIETY DISORDER: ICD-10-CM

## 2022-05-25 RX ORDER — LORAZEPAM 1 MG/1
TABLET ORAL
Qty: 45 TABLET | Refills: 2 | Status: SHIPPED | OUTPATIENT
Start: 2022-05-25 | End: 2022-08-23

## 2022-07-04 RX ORDER — BUSPIRONE HYDROCHLORIDE 7.5 MG/1
TABLET ORAL
Qty: 60 TABLET | Refills: 1 | Status: SHIPPED | OUTPATIENT
Start: 2022-07-04 | End: 2022-08-29

## 2022-07-20 ENCOUNTER — PATIENT MESSAGE (OUTPATIENT)
Dept: FAMILY MEDICINE CLINIC | Age: 47
End: 2022-07-20

## 2022-07-20 DIAGNOSIS — L55.1 SUNBURN, BLISTERING: Primary | ICD-10-CM

## 2022-07-21 NOTE — TELEPHONE ENCOUNTER
From: Lidia Mcdaniel  To: Dr. Keyona Gaviriaer: 7/20/2022 7:48 PM EDT  Subject: Severe sunburn    Hi Dr Keith Vides  I had a sunburn last week, my skin peeled, was swimming yesterday with 50 spf, and burned the peeled skin. I have multiple blisters on my shoulders, chest and back. I am nauseated and have a headache all day and my prn migraine meds don't seem to be working. I have used aloe and Vee salve for burns, not sure it's helping, even though it has only been 24 hours. Do you have any advice for me with something OTC I can use? Do you think IV fluids may help, as I feel I am dehydrated?   Thank you for your time  Monik Nolen

## 2022-08-29 RX ORDER — BUSPIRONE HYDROCHLORIDE 7.5 MG/1
TABLET ORAL
Qty: 60 TABLET | Refills: 1 | Status: SHIPPED | OUTPATIENT
Start: 2022-08-29 | End: 2022-10-23

## 2022-08-29 NOTE — TELEPHONE ENCOUNTER
Patient's last appointment was : 5/2/2022  Patient's next appointment is : Visit date not found  Last refilled: 7/4/22

## 2022-08-30 ENCOUNTER — PATIENT MESSAGE (OUTPATIENT)
Dept: FAMILY MEDICINE CLINIC | Age: 47
End: 2022-08-30

## 2022-08-31 NOTE — TELEPHONE ENCOUNTER
From: Analisa Ac  To: Dr. Bina Song: 8/30/2022 8:23 PM EDT  Subject: Rachel Elms or combination med    Hi Dr Enzo Barnhart,  I was doing some research on additional ways to decrease the snacking/temptation to have a drink and to aid with appetite control. Since my surgery in 2016, I have done well initially. Now I'm back to where I started from. I am reaching out today to get your impit on this med and if I can try it along with my walking regimen and calorie count. Thoughts? ?  Thank you for your time  [facility]

## 2022-09-08 ENCOUNTER — HOSPITAL ENCOUNTER (OUTPATIENT)
Age: 47
Discharge: HOME | End: 2022-09-08
Payer: COMMERCIAL

## 2022-09-08 DIAGNOSIS — Z11.59 ENCOUNTER FOR HEPATITIS C SCREENING TEST FOR LOW RISK PATIENT: ICD-10-CM

## 2022-09-08 DIAGNOSIS — Z11.4 SCREENING FOR HIV (HUMAN IMMUNODEFICIENCY VIRUS): ICD-10-CM

## 2022-09-08 DIAGNOSIS — Z13.220 SCREENING FOR LIPOID DISORDERS: ICD-10-CM

## 2022-09-08 DIAGNOSIS — F41.1 GENERALIZED ANXIETY DISORDER: ICD-10-CM

## 2022-09-08 LAB
ALBUMIN SERPL-MCNC: 3.9 G/DL (ref 3.5–5.1)
ALP BLD-CCNC: 81 U/L (ref 38–126)
ALT SERPL-CCNC: 11 U/L (ref 11–66)
ANION GAP SERPL CALCULATED.3IONS-SCNC: 10 MEQ/L (ref 8–16)
AST SERPL-CCNC: 18 U/L (ref 5–40)
BASOPHILS # BLD: 0.6 %
BASOPHILS ABSOLUTE: 0 THOU/MM3 (ref 0–0.1)
BILIRUB SERPL-MCNC: 0.3 MG/DL (ref 0.3–1.2)
BUN BLDV-MCNC: 13 MG/DL (ref 7–22)
CALCIUM SERPL-MCNC: 9.1 MG/DL (ref 8.5–10.5)
CHLORIDE BLD-SCNC: 102 MEQ/L (ref 98–111)
CHOLESTEROL, TOTAL: 173 MG/DL (ref 100–199)
CO2: 29 MEQ/L (ref 23–33)
CREAT SERPL-MCNC: 0.7 MG/DL (ref 0.4–1.2)
EOSINOPHIL # BLD: 3.1 %
EOSINOPHILS ABSOLUTE: 0.2 THOU/MM3 (ref 0–0.4)
ERYTHROCYTE [DISTWIDTH] IN BLOOD BY AUTOMATED COUNT: 13.9 % (ref 11.5–14.5)
ERYTHROCYTE [DISTWIDTH] IN BLOOD BY AUTOMATED COUNT: 48.8 FL (ref 35–45)
GFR SERPL CREATININE-BSD FRML MDRD: 90 ML/MIN/1.73M2
GLUCOSE BLD-MCNC: 92 MG/DL (ref 70–108)
HCT VFR BLD CALC: 42.1 % (ref 37–47)
HDLC SERPL-MCNC: 51 MG/DL
HEMOGLOBIN: 13.5 GM/DL (ref 12–16)
HEPATITIS C ANTIBODY: NEGATIVE
IMMATURE GRANS (ABS): 0.02 THOU/MM3 (ref 0–0.07)
IMMATURE GRANULOCYTES: 0.3 %
LDL CHOLESTEROL CALCULATED: 107 MG/DL
LYMPHOCYTES # BLD: 35.4 %
LYMPHOCYTES ABSOLUTE: 2.3 THOU/MM3 (ref 1–4.8)
MCH RBC QN AUTO: 30.6 PG (ref 26–33)
MCHC RBC AUTO-ENTMCNC: 32.1 GM/DL (ref 32.2–35.5)
MCV RBC AUTO: 95.5 FL (ref 81–99)
MONOCYTES # BLD: 6.2 %
MONOCYTES ABSOLUTE: 0.4 THOU/MM3 (ref 0.4–1.3)
NUCLEATED RED BLOOD CELLS: 0 /100 WBC
PLATELET # BLD: 283 THOU/MM3 (ref 130–400)
PMV BLD AUTO: 10.2 FL (ref 9.4–12.4)
POTASSIUM SERPL-SCNC: 3.8 MEQ/L (ref 3.5–5.2)
RBC # BLD: 4.41 MILL/MM3 (ref 4.2–5.4)
SEG NEUTROPHILS: 54.4 %
SEGMENTED NEUTROPHILS ABSOLUTE COUNT: 3.5 THOU/MM3 (ref 1.8–7.7)
SODIUM BLD-SCNC: 141 MEQ/L (ref 135–145)
TOTAL PROTEIN: 6.3 G/DL (ref 6.1–8)
TRIGL SERPL-MCNC: 75 MG/DL (ref 0–199)
TSH SERPL DL<=0.05 MIU/L-ACNC: 1.39 UIU/ML (ref 0.4–4.2)
WBC # BLD: 6.5 THOU/MM3 (ref 4.8–10.8)

## 2022-09-08 PROCEDURE — 36415 COLL VENOUS BLD VENIPUNCTURE: CPT

## 2022-09-08 PROCEDURE — 85025 COMPLETE CBC W/AUTO DIFF WBC: CPT

## 2022-09-08 PROCEDURE — 87389 HIV-1 AG W/HIV-1&-2 AB AG IA: CPT

## 2022-09-08 PROCEDURE — 84436 ASSAY OF TOTAL THYROXINE: CPT

## 2022-09-08 PROCEDURE — 80061 LIPID PANEL: CPT

## 2022-09-08 PROCEDURE — 84443 ASSAY THYROID STIM HORMONE: CPT

## 2022-09-08 PROCEDURE — 80053 COMPREHEN METABOLIC PANEL: CPT

## 2022-09-08 PROCEDURE — 86803 HEPATITIS C AB TEST: CPT

## 2022-09-09 LAB
HIV AG/AB: NONREACTIVE
THYROXINE (T4): 10.7 UG/DL (ref 4.5–10.9)

## 2022-09-26 ENCOUNTER — OFFICE VISIT (OUTPATIENT)
Dept: FAMILY MEDICINE CLINIC | Age: 47
End: 2022-09-26
Payer: COMMERCIAL

## 2022-09-26 VITALS
OXYGEN SATURATION: 97 % | HEART RATE: 59 BPM | BODY MASS INDEX: 43.4 KG/M2 | RESPIRATION RATE: 18 BRPM | TEMPERATURE: 98.2 F | DIASTOLIC BLOOD PRESSURE: 74 MMHG | SYSTOLIC BLOOD PRESSURE: 132 MMHG | HEIGHT: 69 IN | WEIGHT: 293 LBS

## 2022-09-26 DIAGNOSIS — E03.9 ACQUIRED HYPOTHYROIDISM: ICD-10-CM

## 2022-09-26 DIAGNOSIS — F41.1 GENERALIZED ANXIETY DISORDER: ICD-10-CM

## 2022-09-26 DIAGNOSIS — E66.01 MORBID OBESITY WITH BMI OF 50.0-59.9, ADULT (HCC): Primary | ICD-10-CM

## 2022-09-26 PROCEDURE — G8417 CALC BMI ABV UP PARAM F/U: HCPCS | Performed by: FAMILY MEDICINE

## 2022-09-26 PROCEDURE — 99214 OFFICE O/P EST MOD 30 MIN: CPT | Performed by: FAMILY MEDICINE

## 2022-09-26 PROCEDURE — G8427 DOCREV CUR MEDS BY ELIG CLIN: HCPCS | Performed by: FAMILY MEDICINE

## 2022-09-26 PROCEDURE — 1036F TOBACCO NON-USER: CPT | Performed by: FAMILY MEDICINE

## 2022-09-26 RX ORDER — LORAZEPAM 1 MG/1
TABLET ORAL
COMMUNITY
Start: 2022-08-29

## 2022-09-26 ASSESSMENT — ENCOUNTER SYMPTOMS
RHINORRHEA: 0
COUGH: 0
NAUSEA: 0
SORE THROAT: 0
SHORTNESS OF BREATH: 0
CONSTIPATION: 0
ABDOMINAL PAIN: 0
DIARRHEA: 0
WHEEZING: 0

## 2022-09-26 NOTE — PROGRESS NOTES
[address] [place] [address] DR. [place] [address]  Dept: [phone]  Loc: [address] (:  1975) is a 56 y.o. female, here for evaluation of the following chief complaint(s):  Discuss Medications (Wants to discuss thyroid medications. Does see Dr. Ebony Mayen in [facility], feels that med is not being effectice. F/u mychart message)      ASSESSMENT/PLAN:  1. Morbid obesity with BMI of 50.0-59.9, adult (HCC)  -     Tirzepatide (MOUNJARO) 2.5 MG/0.5ML SOPN SC injection; Inject 0.5 mLs into the skin once a week, Disp-2 mL, R-0Print  2. Generalized anxiety disorder  3. Acquired hypothyroidism    Will start mounjaro, titrate up. Healthy diet/exercise  Anxiety ocntrolled, cont meds  Follows up with endo next week for thyroid  Return in about 3 months (around 2022) for follow up, weight check. SUBJECTIVE/OBJECTIVE:  HPI  Presents for recheck of chronic conditions including anxiety, hypothyroidism, morbid obesity. She states since her brother passed away a few years ago, she is had increased stress and has gained a significant amount of weight. She mentions that she has gained back all of the weight from her previous weight loss surgery. She is just recently started walking again and would like to start back on healthy lifestyle. She states she tries to get 6000 steps per day at least 3 to 4 days/week and has been doing this lately. She is interested in some sort of medication to help her. She does have anxiety but states overall this is controlled with her BuSpar and Lexapro. She would like to continue at this dose. Sleeps well for the most part denies any thoughts of hurting her self. Also has hypothyroidism and states that she has had some hot flashes lately but her recent thyroid labs were normal.  She denies panic. Does follow-up with Endo next week.   Review of Systems   Constitutional:  Positive for activity change and unexpected weight change. Negative for chills, fatigue and fever. HENT:  Negative for congestion, rhinorrhea and sore throat. Respiratory:  Negative for cough, shortness of breath and wheezing. Cardiovascular:  Negative for chest pain and palpitations. Gastrointestinal:  Negative for abdominal pain, constipation, diarrhea and nausea. Endocrine: Positive for heat intolerance. Genitourinary:  Negative for dysuria and hematuria. Musculoskeletal:  Positive for arthralgias (left shoulder working with PT, which has helped). Negative for myalgias. Neurological:  Negative for dizziness and headaches. Psychiatric/Behavioral:  Negative for decreased concentration, dysphoric mood and sleep disturbance. The patient is nervous/anxious. Physical Exam  Vitals and nursing note reviewed. Constitutional:       Appearance: She is well-developed. She is obese. HENT:      Head: Normocephalic and atraumatic. Eyes:      General: No scleral icterus. Right eye: No discharge. Left eye: No discharge. Conjunctiva/sclera: Conjunctivae normal.   Cardiovascular:      Rate and Rhythm: Normal rate and regular rhythm. Heart sounds: Normal heart sounds. Pulmonary:      Effort: Pulmonary effort is normal.      Breath sounds: Normal breath sounds. No wheezing. Skin:     General: Skin is warm and dry. Neurological:      Mental Status: She is alert and oriented to person, place, and time. Mental status is at baseline. Psychiatric:         Behavior: Behavior normal.         Thought Content: Thought content normal.         Judgment: Judgment normal.       Vitals:    09/26/22 1502   BP: 132/74   Pulse: 59   Resp: 18   Temp: 98.2 °F (36.8 °C)   SpO2: 97%              An electronic signature was used to authenticate this note.     --Kunal Proctor MD

## 2022-10-04 ENCOUNTER — HOSPITAL ENCOUNTER (OUTPATIENT)
Age: 47
Discharge: HOME | End: 2022-10-04
Payer: COMMERCIAL

## 2022-10-04 DIAGNOSIS — Z01.84 IMMUNITY STATUS TESTING: Primary | ICD-10-CM

## 2022-10-04 DIAGNOSIS — Z01.84 IMMUNITY STATUS TESTING: ICD-10-CM

## 2022-10-04 PROCEDURE — 36415 COLL VENOUS BLD VENIPUNCTURE: CPT

## 2022-10-04 PROCEDURE — 86787 VARICELLA-ZOSTER ANTIBODY: CPT

## 2022-10-07 LAB — VZV IGG SER QL IA: 4.11

## 2022-10-12 ENCOUNTER — TELEPHONE (OUTPATIENT)
Dept: FAMILY MEDICINE CLINIC | Age: 47
End: 2022-10-12

## 2022-10-12 ENCOUNTER — PATIENT MESSAGE (OUTPATIENT)
Dept: FAMILY MEDICINE CLINIC | Age: 47
End: 2022-10-12

## 2022-10-12 DIAGNOSIS — E66.01 MORBID OBESITY DUE TO EXCESS CALORIES (HCC): Primary | ICD-10-CM

## 2022-10-12 RX ORDER — TIRZEPATIDE 5 MG/.5ML
5 INJECTION, SOLUTION SUBCUTANEOUS WEEKLY
Qty: 2 ML | Refills: 0 | Status: SHIPPED | OUTPATIENT
Start: 2022-10-12 | End: 2022-11-11

## 2022-10-12 NOTE — TELEPHONE ENCOUNTER
From: Peggy Oquendo  To: Dr. Veronica Roth: 10/12/2022 8:23 AM EDT  Subject: Verdene Midget    Good Morning,  Last evening I took my 3rd shot of the 2.5 mg. I wanted to let you know that I am interested in stepping up to the 5mg dose. I have made changes, as my appetite is defiantly suppressed, and have not had pop or alcohol since I started this process. I have had minimal side effects, mostly nausea and slight constipation,which are both under control. I have not weighed myself, however, my scrubs and jeans are not as tight. I am reaching out now, as when I picked up my medication at [facility], the pharmacist mentioned there could be a delay in getting the medication. If you are willing to increase my dose, do I need to stop by the office, or is this something you can send to the pharmacy? Thank you for your time, and for suggesting this tool for me.   Camelia Guzman

## 2022-10-20 ENCOUNTER — PATIENT MESSAGE (OUTPATIENT)
Dept: FAMILY MEDICINE CLINIC | Age: 47
End: 2022-10-20

## 2022-10-20 DIAGNOSIS — Z01.84 IMMUNITY STATUS TESTING: Primary | ICD-10-CM

## 2022-10-20 NOTE — TELEPHONE ENCOUNTER
From: Stella Saavedra  To: Dr. Zoila Salcedo: 10/20/2022 8:20 AM EDT  Subject: Hepatitis B Antibody titer    Good Morning! I am compliant with my hepatitis B vaccines, however [place] is requesting a Hepatitis B surface antibody titer blood test. 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   Thank you,  Britt Freedman

## 2022-10-21 NOTE — TELEPHONE ENCOUNTER
Received Den[place] [facility] [place]19[place]2022 To get better and follow your care plan as instructed.

## 2022-10-23 RX ORDER — BUSPIRONE HYDROCHLORIDE 7.5 MG/1
TABLET ORAL
Qty: 60 TABLET | Refills: 1 | Status: SHIPPED | OUTPATIENT
Start: 2022-10-23

## 2022-11-08 ENCOUNTER — PATIENT MESSAGE (OUTPATIENT)
Dept: FAMILY MEDICINE CLINIC | Age: 47
End: 2022-11-08

## 2022-11-08 DIAGNOSIS — F41.1 GENERALIZED ANXIETY DISORDER: Primary | ICD-10-CM

## 2022-11-08 DIAGNOSIS — G43.909 MIGRAINE WITHOUT STATUS MIGRAINOSUS, NOT INTRACTABLE, UNSPECIFIED MIGRAINE TYPE: ICD-10-CM

## 2022-11-08 NOTE — TELEPHONE ENCOUNTER
From: Cynthia Chen  To: Dr. Bella Princeton: 11/8/2022 10:25 AM EST  Subject: Bang Godinez 7.5mg    Good Morning! I am happy to report 19 lbs down since end of september! ! I am having minimal side effects, mostly feeling full and occasional constipation. I have one week of the 5mg left, and would like to advance to the 7.5mg dose. [facility] appreciated the early order for the 5mg dose, as they had to order it in. Is it possible for a script to be sent? Also, may I have a refill on my fiorcet and ativan? Thank you!!   Neal Nicole

## 2022-11-09 RX ORDER — BUTALBITAL, ACETAMINOPHEN AND CAFFEINE 50; 325; 40 MG/1; MG/1; MG/1
1 TABLET ORAL EVERY 4 HOURS PRN
Qty: 60 TABLET | Refills: 3 | Status: SHIPPED | OUTPATIENT
Start: 2022-11-09

## 2022-11-09 RX ORDER — LORAZEPAM 1 MG/1
TABLET ORAL
Qty: 45 TABLET | Refills: 2 | Status: SHIPPED | OUTPATIENT
Start: 2022-11-09 | End: 2023-02-09

## 2022-11-14 DIAGNOSIS — E66.01 MORBID OBESITY DUE TO EXCESS CALORIES (HCC): Primary | ICD-10-CM

## 2022-11-14 RX ORDER — TIRZEPATIDE 7.5 MG/.5ML
7.5 INJECTION, SOLUTION SUBCUTANEOUS WEEKLY
Qty: 2 ML | Refills: 0 | Status: SHIPPED | OUTPATIENT
Start: 2022-11-14 | End: 2022-11-28

## 2022-11-28 ENCOUNTER — PATIENT MESSAGE (OUTPATIENT)
Dept: FAMILY MEDICINE CLINIC | Age: 47
End: 2022-11-28

## 2022-11-28 DIAGNOSIS — E66.01 MORBID OBESITY DUE TO EXCESS CALORIES (HCC): Primary | ICD-10-CM

## 2022-11-28 RX ORDER — TIRZEPATIDE 10 MG/.5ML
10 INJECTION, SOLUTION SUBCUTANEOUS WEEKLY
Qty: 2 ML | Refills: 0 | Status: SHIPPED | OUTPATIENT
Start: 2022-11-28 | End: 2022-12-28

## 2022-11-28 NOTE — TELEPHONE ENCOUNTER
From: Francesco Tang  To: Dr. Gina Hodges: 11/28/2022 10:42 AM EST  Subject: Ken Letters Morning Dr [employer]! I am emailing today to let you know I am not able to get the mounjaro 7.5 mg through [facility]. It is on back order at first until this week, today they said mid December. Do you have any samples that I could  until the prescription is available, or would you be willing to send over another prescription for either 5mg or 10mg? I did not lose much with the 5mg, but will be willing to try it another month. I havent had a shot in 8 days. They are able to get both doses in next business day, just a national issue with 7.5mg. Any advice?   Cory Carrasco [phone]

## 2022-11-30 ENCOUNTER — TELEPHONE (OUTPATIENT)
Dept: FAMILY MEDICINE CLINIC | Age: 47
End: 2022-11-30

## 2022-11-30 NOTE — TELEPHONE ENCOUNTER
Mounjaro 7.5/5mL Prior Authorization received request for 7.5 but last RX was sent for a 10. Submitted through [employer].

## 2022-12-01 NOTE — TELEPHONE ENCOUNTER
Denial received. Anti-Obesity meds are not covered by plan. No Need to submit anymore PA's until New year incase plan changes.

## 2022-12-19 ENCOUNTER — OFFICE VISIT (OUTPATIENT)
Dept: FAMILY MEDICINE CLINIC | Age: 47
End: 2022-12-19
Payer: COMMERCIAL

## 2022-12-19 VITALS
HEART RATE: 76 BPM | SYSTOLIC BLOOD PRESSURE: 130 MMHG | OXYGEN SATURATION: 97 % | WEIGHT: 293 LBS | BODY MASS INDEX: 43.4 KG/M2 | RESPIRATION RATE: 18 BRPM | TEMPERATURE: 97.2 F | HEIGHT: 69 IN | DIASTOLIC BLOOD PRESSURE: 84 MMHG

## 2022-12-19 DIAGNOSIS — K59.03 DRUG-INDUCED CONSTIPATION: ICD-10-CM

## 2022-12-19 DIAGNOSIS — F41.1 GENERALIZED ANXIETY DISORDER: Primary | ICD-10-CM

## 2022-12-19 DIAGNOSIS — E03.9 ACQUIRED HYPOTHYROIDISM: ICD-10-CM

## 2022-12-19 DIAGNOSIS — I10 ESSENTIAL HYPERTENSION: ICD-10-CM

## 2022-12-19 DIAGNOSIS — E66.01 MORBID OBESITY DUE TO EXCESS CALORIES (HCC): ICD-10-CM

## 2022-12-19 PROCEDURE — 3078F DIAST BP <80 MM HG: CPT | Performed by: FAMILY MEDICINE

## 2022-12-19 PROCEDURE — G8417 CALC BMI ABV UP PARAM F/U: HCPCS | Performed by: FAMILY MEDICINE

## 2022-12-19 PROCEDURE — 1036F TOBACCO NON-USER: CPT | Performed by: FAMILY MEDICINE

## 2022-12-19 PROCEDURE — 99214 OFFICE O/P EST MOD 30 MIN: CPT | Performed by: FAMILY MEDICINE

## 2022-12-19 PROCEDURE — G8427 DOCREV CUR MEDS BY ELIG CLIN: HCPCS | Performed by: FAMILY MEDICINE

## 2022-12-19 PROCEDURE — G8484 FLU IMMUNIZE NO ADMIN: HCPCS | Performed by: FAMILY MEDICINE

## 2022-12-19 PROCEDURE — 3074F SYST BP LT 130 MM HG: CPT | Performed by: FAMILY MEDICINE

## 2022-12-19 RX ORDER — TIRZEPATIDE 12.5 MG/.5ML
12.5 INJECTION, SOLUTION SUBCUTANEOUS WEEKLY
Qty: 6 ML | Refills: 0 | Status: SHIPPED | OUTPATIENT
Start: 2022-12-19 | End: 2023-01-18

## 2022-12-19 RX ORDER — BUSPIRONE HYDROCHLORIDE 7.5 MG/1
TABLET ORAL
Qty: 180 TABLET | Refills: 3 | Status: SHIPPED | OUTPATIENT
Start: 2022-12-19

## 2022-12-19 RX ORDER — TIRZEPATIDE 10 MG/.5ML
10 INJECTION, SOLUTION SUBCUTANEOUS WEEKLY
Qty: 2 ML | Refills: 1 | Status: CANCELLED | OUTPATIENT
Start: 2022-12-19 | End: 2023-01-18

## 2022-12-19 ASSESSMENT — ENCOUNTER SYMPTOMS
SORE THROAT: 0
SHORTNESS OF BREATH: 0
CONSTIPATION: 1
COUGH: 0
RHINORRHEA: 0
DIARRHEA: 0
NAUSEA: 0
WHEEZING: 0
ABDOMINAL PAIN: 0

## 2022-12-19 NOTE — PROGRESS NOTES
[address]. [place] [address]  Dept: [phone]  Loc: [address] (:  1975) is a 56 y.o. female, here for evaluation of the following chief complaint(s):  Follow-up (Weight check- pt states that she was 316 at home but has been constipated every since [place] the [facility]. Has been taking stool softener and Senna with minimal relief. )      ASSESSMENT/PLAN:  1. Generalized anxiety disorder  -     busPIRone (BUSPAR) 7.5 MG tablet; take 1 tablet by mouth twice a day, Disp-180 tablet, R-3Normal  2. Morbid obesity due to excess calories (HCC)  -     Tirzepatide (MOUNJARO) 12.5 MG/0.5ML SOPN SC injection; Inject 0.5 mLs into the skin once a week, Disp-6 mL, R-0Normal  3. Essential hypertension  4. Acquired hypothyroidism  5. Drug-induced constipation    Controlled, continue BuSpar. Increase Kelsey to 12.5 mg. Follow-up in 3 months for weight check. Blood pressure and hypothyroidism controlled, continue current meds. Add MiraLAX for constipation and avoid daily senna. Push fluids and fiber. Return for follow up. SUBJECTIVE/OBJECTIVE:  HPI  Resents for 3-month follow-up of chronic medical conditions including morbid obesity, hypertension, hypothyroidism, anxiety. She has lost nearly 25 pounds in the past 3 months with my 0. States that she tolerates medication well although she has been having constipation. States her last bowel movement was 4 days ago. States she has done Colace and senna almost daily. Feels as if she may have a bowel movement soon. Denies abdominal pain or nausea. States her appetite has been decreased but she is trying to drink protein shakes to get her nutrition. She also is eating a lot of grilled chicken and vegetables in the evening for dinner. Anxiety is well controlled with her BuSpar denies any depression symptoms.   Blood pressures controlled and so is her thyroid and she takes her medications daily as prescribed. Review of Systems   Constitutional:  Positive for appetite change and unexpected weight change. Negative for chills, fatigue and fever. HENT:  Negative for congestion, rhinorrhea and sore throat. Respiratory:  Negative for cough, shortness of breath and wheezing. Cardiovascular:  Negative for chest pain and palpitations. Gastrointestinal:  Positive for constipation. Negative for abdominal pain, diarrhea and nausea. Genitourinary:  Negative for dysuria and hematuria. Musculoskeletal:  Negative for arthralgias and myalgias. Neurological:  Negative for dizziness and headaches. Psychiatric/Behavioral:  Negative for decreased concentration, dysphoric mood and sleep disturbance. The patient is not nervous/anxious. Physical Exam  Vitals and nursing note reviewed. Constitutional:       Appearance: She is well-developed. She is obese. HENT:      Head: Normocephalic and atraumatic. Eyes:      General: No scleral icterus. Right eye: No discharge. Left eye: No discharge. Conjunctiva/sclera: Conjunctivae normal.   Cardiovascular:      Rate and Rhythm: Normal rate and regular rhythm. Heart sounds: Normal heart sounds. Pulmonary:      Effort: Pulmonary effort is normal.      Breath sounds: Normal breath sounds. No wheezing. Skin:     General: Skin is warm and dry. Neurological:      Mental Status: She is alert and oriented to person, place, and time. Mental status is at baseline. Psychiatric:         Behavior: Behavior normal.         Thought Content:  Thought content normal.         Judgment: Judgment normal.       Vitals:    12/19/22 1531   BP: 130/84   Pulse: 76   Resp: 18   Temp: 97.2 °F (36.2 °C)   SpO2: 97%        On this date 12/19/2022 I have spent 32 minutes reviewing previous notes, test results and face to face with the patient discussing the diagnosis and importance of compliance with the treatment plan as well as documenting on the day of the visit. An electronic signature was used to authenticate this note.     --Torin Chavez MD

## 2022-12-20 ENCOUNTER — TELEPHONE (OUTPATIENT)
Dept: FAMILY MEDICINE CLINIC | Age: 47
End: 2022-12-20

## 2023-01-10 ENCOUNTER — TELEPHONE (OUTPATIENT)
Dept: FAMILY MEDICINE CLINIC | Age: 48
End: 2023-01-10

## 2023-01-10 NOTE — TELEPHONE ENCOUNTER
Received a fax from Patient's pharmacy requesting a PA on her Mounjaro 12.5 PA submitted through [employer] the [email or web address]. On 1/9/2023.

## 2023-02-13 ENCOUNTER — OFFICE VISIT (OUTPATIENT)
Dept: FAMILY MEDICINE CLINIC | Age: 48
End: 2023-02-13
Payer: COMMERCIAL

## 2023-02-13 VITALS
BODY MASS INDEX: 46.05 KG/M2 | DIASTOLIC BLOOD PRESSURE: 88 MMHG | HEART RATE: 86 BPM | OXYGEN SATURATION: 96 % | SYSTOLIC BLOOD PRESSURE: 126 MMHG | RESPIRATION RATE: 18 BRPM | WEIGHT: 293 LBS

## 2023-02-13 DIAGNOSIS — J02.9 SORE THROAT: ICD-10-CM

## 2023-02-13 DIAGNOSIS — H10.33 ACUTE BACTERIAL CONJUNCTIVITIS OF BOTH EYES: ICD-10-CM

## 2023-02-13 DIAGNOSIS — J01.00 ACUTE NON-RECURRENT MAXILLARY SINUSITIS: Primary | ICD-10-CM

## 2023-02-13 PROCEDURE — G8427 DOCREV CUR MEDS BY ELIG CLIN: HCPCS | Performed by: NURSE PRACTITIONER

## 2023-02-13 PROCEDURE — 3079F DIAST BP 80-89 MM HG: CPT | Performed by: NURSE PRACTITIONER

## 2023-02-13 PROCEDURE — 3074F SYST BP LT 130 MM HG: CPT | Performed by: NURSE PRACTITIONER

## 2023-02-13 PROCEDURE — 99213 OFFICE O/P EST LOW 20 MIN: CPT | Performed by: NURSE PRACTITIONER

## 2023-02-13 PROCEDURE — G8484 FLU IMMUNIZE NO ADMIN: HCPCS | Performed by: NURSE PRACTITIONER

## 2023-02-13 PROCEDURE — G8417 CALC BMI ABV UP PARAM F/U: HCPCS | Performed by: NURSE PRACTITIONER

## 2023-02-13 PROCEDURE — 1036F TOBACCO NON-USER: CPT | Performed by: NURSE PRACTITIONER

## 2023-02-13 RX ORDER — OFLOXACIN 3 MG/ML
1 SOLUTION/ DROPS OPHTHALMIC 4 TIMES DAILY
Qty: 10 ML | Refills: 0 | Status: SHIPPED | OUTPATIENT
Start: 2023-02-13 | End: 2023-02-23

## 2023-02-13 RX ORDER — POLYMYXIN B SULFATE AND TRIMETHOPRIM 1; 10000 MG/ML; [USP'U]/ML
SOLUTION OPHTHALMIC
COMMUNITY
Start: 2023-02-11

## 2023-02-13 RX ORDER — AZITHROMYCIN 250 MG/1
TABLET, FILM COATED ORAL
COMMUNITY
Start: 2023-02-11

## 2023-02-13 RX ORDER — AMOXICILLIN AND CLAVULANATE POTASSIUM 875; 125 MG/1; MG/1
1 TABLET, FILM COATED ORAL 2 TIMES DAILY
Qty: 14 TABLET | Refills: 0 | Status: SHIPPED | OUTPATIENT
Start: 2023-02-13 | End: 2023-02-20

## 2023-02-13 ASSESSMENT — ENCOUNTER SYMPTOMS
SHORTNESS OF BREATH: 0
WHEEZING: 0
VOMITING: 0
NAUSEA: 0
EYE REDNESS: 1
SORE THROAT: 1
SINUS PRESSURE: 1
EYE DISCHARGE: 1
SINUS PAIN: 1
COUGH: 1
ABDOMINAL PAIN: 0
DIARRHEA: 0
COLOR CHANGE: 0

## 2023-02-13 NOTE — PROGRESS NOTES
Silvia Layton (:  1975) is a 53 y.o. female,Established patient, here for evaluation of the following chief complaint(s): Conjunctivitis (Bilateral eye drops and z-pack as left ear was red given on 23), Pharyngitis, and Sinusitis (Started Thursday and felt fine friday)         ASSESSMENT/PLAN:  1. Acute non-recurrent maxillary sinusitis  2. Acute bacterial conjunctivitis of both eyes  3. Sore throat    Change eye drops to oflox drops  Wash hands  Stop zpak  Augmentin   Fluids  Rest  Tylenol      Return if symptoms worsen or fail to improve. Subjective   SUBJECTIVE/OBJECTIVE:  HPI    Last week started with cough and congestion. Also pink eye. Went to  and given eye drops and zpak. Not any better. Now has severe sore throat. Headache and congestion. Symptoms not improving. Eyes still very red. Facial pressure. Some fatigue. No sob or cp. No GI symptoms. Does work at school. Review of Systems   Constitutional:  Negative for chills, fatigue and fever. HENT:  Positive for congestion, postnasal drip, sinus pressure, sinus pain and sore throat. Negative for ear pain. Eyes:  Positive for discharge and redness. Respiratory:  Positive for cough. Negative for shortness of breath and wheezing. Gastrointestinal:  Negative for abdominal pain, diarrhea, nausea and vomiting. Skin:  Negative for color change and rash. Neurological:  Positive for headaches. Negative for dizziness and light-headedness. Objective   Physical Exam  Constitutional:       Appearance: Normal appearance. She is well-developed. HENT:      Head: Normocephalic. Right Ear: Tympanic membrane is bulging. Tympanic membrane is not erythematous. Left Ear: Tympanic membrane is bulging. Tympanic membrane is not erythematous. Nose: Congestion present. Right Sinus: Maxillary sinus tenderness present. No frontal sinus tenderness.       Left Sinus: Maxillary sinus tenderness present. No frontal sinus tenderness. Mouth/Throat:      Mouth: Mucous membranes are moist.      Pharynx: Oropharynx is clear. Uvula midline. No oropharyngeal exudate or posterior oropharyngeal erythema. Eyes:      General:         Right eye: Discharge present. Left eye: Discharge present. Cardiovascular:      Rate and Rhythm: Normal rate. Pulmonary:      Effort: Pulmonary effort is normal. No respiratory distress. Breath sounds: Normal breath sounds. No wheezing or rales. Chest:      Chest wall: No tenderness. Abdominal:      General: There is no distension. Palpations: Abdomen is soft. Tenderness: There is no abdominal tenderness. Musculoskeletal:      Cervical back: Normal range of motion and neck supple. Lymphadenopathy:      Cervical: No cervical adenopathy. Skin:     General: Skin is warm and dry. Findings: No rash. Neurological:      Mental Status: She is alert and oriented to person, place, and time. On this date 2/13/2023 I have spent 25 minutes reviewing previous notes, test results and face to face with the patient discussing the diagnosis and importance of compliance with the treatment plan as well as documenting on the day of the visit. An electronic signature was used to authenticate this note.     --Janine Calhoun, NICO - CNP

## 2023-02-20 DIAGNOSIS — I10 ESSENTIAL HYPERTENSION: ICD-10-CM

## 2023-02-20 DIAGNOSIS — F43.21 GRIEF: ICD-10-CM

## 2023-02-20 RX ORDER — ESCITALOPRAM OXALATE 20 MG/1
TABLET ORAL
Qty: 90 TABLET | Refills: 3 | Status: SHIPPED | OUTPATIENT
Start: 2023-02-20

## 2023-02-20 RX ORDER — LISINOPRIL AND HYDROCHLOROTHIAZIDE 25; 20 MG/1; MG/1
TABLET ORAL
Qty: 90 TABLET | Refills: 3 | Status: SHIPPED | OUTPATIENT
Start: 2023-02-20

## 2023-02-23 ENCOUNTER — PATIENT MESSAGE (OUTPATIENT)
Dept: FAMILY MEDICINE CLINIC | Age: 48
End: 2023-02-23

## 2023-02-23 DIAGNOSIS — R11.0 NAUSEA: ICD-10-CM

## 2023-02-23 DIAGNOSIS — E66.01 MORBID OBESITY DUE TO EXCESS CALORIES (HCC): ICD-10-CM

## 2023-02-23 RX ORDER — TIRZEPATIDE 12.5 MG/.5ML
12.5 INJECTION, SOLUTION SUBCUTANEOUS WEEKLY
Qty: 6 ML | Refills: 3 | Status: SHIPPED | OUTPATIENT
Start: 2023-02-23 | End: 2023-03-25

## 2023-02-23 RX ORDER — ONDANSETRON 4 MG/1
4 TABLET, ORALLY DISINTEGRATING ORAL EVERY 8 HOURS PRN
Qty: 45 TABLET | Refills: 2 | Status: SHIPPED | OUTPATIENT
Start: 2023-02-23

## 2023-02-23 NOTE — TELEPHONE ENCOUNTER
From: Cas Beth  To: Dr. Ness Ice: 2/23/2023 9:59 AM EST  Subject: Ramin Hanna,  I had to transfer my refill to [facility] due to [employer] not being able to get the medication in. I was able to use the 12.5 mg refill yesterday at [facility] in [place]. They are asking that you send a script with refills on so they can prepare for next month? Also, I was going to discuss with you at my appointment in March, but will need the refill before then. I am hoping to stay on the 12.5 mg dose if that is possible, I am not comfortable increasing dose due to not getting enough calories in during the day. I weighed last night prior to my injection and I am currently at 305! Also, may I have more zofran sent into [facility], please? Thankyou for your assistance with this!!   Stacy Verde

## 2023-03-16 ENCOUNTER — TELEPHONE (OUTPATIENT)
Dept: FAMILY MEDICINE CLINIC | Age: 48
End: 2023-03-16

## 2023-03-16 DIAGNOSIS — E66.01 MORBID OBESITY DUE TO EXCESS CALORIES (HCC): Primary | ICD-10-CM

## 2023-03-16 RX ORDER — TIRZEPATIDE 10 MG/.5ML
10 INJECTION, SOLUTION SUBCUTANEOUS WEEKLY
Qty: 4 ADJUSTABLE DOSE PRE-FILLED PEN SYRINGE | Refills: 0 | Status: SHIPPED | OUTPATIENT
Start: 2023-03-16

## 2023-03-20 ENCOUNTER — OFFICE VISIT (OUTPATIENT)
Dept: FAMILY MEDICINE CLINIC | Age: 48
End: 2023-03-20
Payer: COMMERCIAL

## 2023-03-20 VITALS
TEMPERATURE: 97.2 F | RESPIRATION RATE: 18 BRPM | SYSTOLIC BLOOD PRESSURE: 128 MMHG | HEART RATE: 88 BPM | DIASTOLIC BLOOD PRESSURE: 78 MMHG | HEIGHT: 69 IN | OXYGEN SATURATION: 98 % | BODY MASS INDEX: 43.4 KG/M2 | WEIGHT: 293 LBS

## 2023-03-20 DIAGNOSIS — I10 ESSENTIAL HYPERTENSION: Primary | ICD-10-CM

## 2023-03-20 DIAGNOSIS — F41.1 GENERALIZED ANXIETY DISORDER: ICD-10-CM

## 2023-03-20 DIAGNOSIS — E66.01 MORBID OBESITY DUE TO EXCESS CALORIES (HCC): ICD-10-CM

## 2023-03-20 PROCEDURE — 3074F SYST BP LT 130 MM HG: CPT | Performed by: FAMILY MEDICINE

## 2023-03-20 PROCEDURE — 3078F DIAST BP <80 MM HG: CPT | Performed by: FAMILY MEDICINE

## 2023-03-20 PROCEDURE — G8427 DOCREV CUR MEDS BY ELIG CLIN: HCPCS | Performed by: FAMILY MEDICINE

## 2023-03-20 PROCEDURE — 1036F TOBACCO NON-USER: CPT | Performed by: FAMILY MEDICINE

## 2023-03-20 PROCEDURE — G8484 FLU IMMUNIZE NO ADMIN: HCPCS | Performed by: FAMILY MEDICINE

## 2023-03-20 PROCEDURE — G8417 CALC BMI ABV UP PARAM F/U: HCPCS | Performed by: FAMILY MEDICINE

## 2023-03-20 PROCEDURE — 99214 OFFICE O/P EST MOD 30 MIN: CPT | Performed by: FAMILY MEDICINE

## 2023-03-20 RX ORDER — TIRZEPATIDE 10 MG/.5ML
10 INJECTION, SOLUTION SUBCUTANEOUS WEEKLY
Qty: 2 ML | Refills: 5 | Status: SHIPPED | OUTPATIENT
Start: 2023-03-20

## 2023-03-20 SDOH — ECONOMIC STABILITY: HOUSING INSECURITY
IN THE LAST 12 MONTHS, WAS THERE A TIME WHEN YOU DID NOT HAVE A STEADY PLACE TO SLEEP OR SLEPT IN A SHELTER (INCLUDING NOW)?: NO

## 2023-03-20 SDOH — ECONOMIC STABILITY: INCOME INSECURITY: HOW HARD IS IT FOR YOU TO PAY FOR THE VERY BASICS LIKE FOOD, HOUSING, MEDICAL CARE, AND HEATING?: NOT HARD AT ALL

## 2023-03-20 SDOH — ECONOMIC STABILITY: FOOD INSECURITY: WITHIN THE PAST 12 MONTHS, YOU WORRIED THAT YOUR FOOD WOULD RUN OUT BEFORE YOU GOT MONEY TO BUY MORE.: NEVER TRUE

## 2023-03-20 SDOH — ECONOMIC STABILITY: FOOD INSECURITY: WITHIN THE PAST 12 MONTHS, THE FOOD YOU BOUGHT JUST DIDN'T LAST AND YOU DIDN'T HAVE MONEY TO GET MORE.: NEVER TRUE

## 2023-03-20 ASSESSMENT — PATIENT HEALTH QUESTIONNAIRE - PHQ9
1. LITTLE INTEREST OR PLEASURE IN DOING THINGS: 0
SUM OF ALL RESPONSES TO PHQ QUESTIONS 1-9: 0
2. FEELING DOWN, DEPRESSED OR HOPELESS: 0
SUM OF ALL RESPONSES TO PHQ9 QUESTIONS 1 & 2: 0
SUM OF ALL RESPONSES TO PHQ QUESTIONS 1-9: 0

## 2023-03-20 ASSESSMENT — ENCOUNTER SYMPTOMS
SORE THROAT: 0
ABDOMINAL PAIN: 0
DIARRHEA: 0
NAUSEA: 1
RHINORRHEA: 0
WHEEZING: 0
SHORTNESS OF BREATH: 0
COUGH: 0
CONSTIPATION: 0

## 2023-03-20 NOTE — PROGRESS NOTES
Gastrointestinal:  Positive for nausea. Negative for abdominal pain, constipation and diarrhea. Genitourinary:  Negative for dysuria and hematuria. Musculoskeletal:  Negative for arthralgias and myalgias. Neurological:  Negative for dizziness and headaches. Psychiatric/Behavioral:  Negative for dysphoric mood and sleep disturbance. The patient is not nervous/anxious. Physical Exam  Vitals and nursing note reviewed. Constitutional:       Appearance: She is well-developed. She is obese. HENT:      Head: Normocephalic and atraumatic. Eyes:      General: No scleral icterus. Right eye: No discharge. Left eye: No discharge. Conjunctiva/sclera: Conjunctivae normal.   Cardiovascular:      Rate and Rhythm: Normal rate and regular rhythm. Heart sounds: Normal heart sounds. Pulmonary:      Effort: Pulmonary effort is normal.      Breath sounds: Normal breath sounds. No wheezing. Skin:     General: Skin is warm and dry. Neurological:      Mental Status: She is alert and oriented to person, place, and time. Mental status is at baseline. Psychiatric:         Behavior: Behavior normal.         Thought Content: Thought content normal.         Judgment: Judgment normal.       Vitals:    03/20/23 1533   BP: 128/78   Pulse: 88   Resp: 18   Temp: 97.2 °F (36.2 °C)   SpO2: 98%              An electronic signature was used to authenticate this note.     --Ailyn Low MD

## 2023-03-28 ENCOUNTER — APPOINTMENT (OUTPATIENT)
Dept: GENERAL RADIOLOGY | Age: 48
End: 2023-03-28
Payer: COMMERCIAL

## 2023-03-28 ENCOUNTER — HOSPITAL ENCOUNTER (EMERGENCY)
Age: 48
Discharge: HOME | End: 2023-03-28
Attending: EMERGENCY MEDICINE
Payer: COMMERCIAL

## 2023-03-28 VITALS
WEIGHT: 293 LBS | OXYGEN SATURATION: 98 % | SYSTOLIC BLOOD PRESSURE: 118 MMHG | RESPIRATION RATE: 15 BRPM | HEART RATE: 81 BPM | DIASTOLIC BLOOD PRESSURE: 58 MMHG | HEIGHT: 67 IN | BODY MASS INDEX: 45.99 KG/M2 | TEMPERATURE: 98.2 F

## 2023-03-28 DIAGNOSIS — R07.89 ATYPICAL CHEST PAIN: Primary | ICD-10-CM

## 2023-03-28 DIAGNOSIS — R11.0 NAUSEA: ICD-10-CM

## 2023-03-28 LAB
ALBUMIN SERPL BCP-MCNC: 3.5 GM/DL (ref 3.4–5)
ALP SERPL-CCNC: 78 U/L (ref 46–116)
ALT SERPL W P-5'-P-CCNC: 30 U/L (ref 14–63)
ANION GAP SERPL CALC-SCNC: 8 MEQ/L (ref 8–16)
AST SERPL W P-5'-P-CCNC: 20 U/L (ref 15–37)
BASOPHILS # BLD: 0.3 % (ref 0–3)
BASOPHILS ABSOLUTE: 0 THOU/MM3 (ref 0–0.1)
BILIRUB SERPL-MCNC: 0.5 MG/DL (ref 0.2–1)
BUN SERPL-MCNC: 14 MG/DL (ref 7–18)
CALCIUM SERPL-MCNC: 9 MG/DL (ref 8.5–10.1)
CHLORIDE SERPL-SCNC: 103 MEQ/L (ref 98–107)
CO2 SERPL-SCNC: 30 MEQ/L (ref 21–32)
CREAT SERPL-MCNC: 0.7 MG/DL (ref 0.6–1.3)
EOSINOPHILS ABSOLUTE: 0.2 THOU/MM3 (ref 0–0.5)
EOSINOPHILS RELATIVE PERCENT: 2 % (ref 0–4)
GFR SERPL CREATININE-BSD FRML MDRD: > 60 ML/MIN/1.73M2
GLUCOSE SERPL-MCNC: 95 MG/DL (ref 74–106)
HCT VFR BLD CALC: 40.1 % (ref 37–47)
HEMOGLOBIN: 13.6 GM/DL (ref 12–16)
IMMATURE GRANS (ABS): 0.01 THOU/MM3 (ref 0–0.07)
IMMATURE GRANULOCYTES: 0 %
LIPASE SERPL-CCNC: 146 U/L (ref 73–393)
LYMPHOCYTES # BLD AUTO: 35.5 % (ref 15–47)
LYMPHOCYTES ABSOLUTE: 3.6 THOU/MM3 (ref 1–4.8)
MCH RBC QN AUTO: 31.2 PG (ref 26–32)
MCHC RBC AUTO-ENTMCNC: 33.9 GM/DL (ref 31–35)
MCV RBC AUTO: 92 FL (ref 81–99)
MONOCYTES: 0.7 THOU/MM3 (ref 0.3–1.3)
MONOCYTES: 6.5 % (ref 0–12)
NT PRO BNP: 56 PG/ML (ref 0–450)
PDW BLD-RTO: 13 % (ref 11.5–14.9)
PLATELET # BLD AUTO: 277 THOU/MM3 (ref 130–400)
PMV BLD AUTO: 9.2 FL (ref 9.4–12.4)
POTASSIUM SERPL-SCNC: 3.6 MEQ/L (ref 3.5–5.1)
PROT SERPL-MCNC: 6.6 GM/DL (ref 6.4–8.2)
RBC # BLD: 4.36 MILL/MM3 (ref 4.1–5.3)
SEG NEUTROPHILS: 55.6 % (ref 43–75)
SEGMENTED NEUTROPHILS ABSOLUTE COUNT: 5.7 THOU/MM3 (ref 1.8–7.7)
SODIUM SERPL-SCNC: 141 MEQ/L (ref 136–145)
TROPONIN, HIGH SENSITIVITY: 5.8 PG/ML (ref 0–51.3)
WBC # BLD: 10.2 THOU/MM3 (ref 4.8–10.8)

## 2023-03-28 PROCEDURE — 93005 ELECTROCARDIOGRAM TRACING: CPT | Performed by: EMERGENCY MEDICINE

## 2023-03-28 PROCEDURE — 83880 ASSAY OF NATRIURETIC PEPTIDE: CPT

## 2023-03-28 PROCEDURE — 99285 EMERGENCY DEPT VISIT HI MDM: CPT

## 2023-03-28 PROCEDURE — 71045 X-RAY EXAM CHEST 1 VIEW: CPT

## 2023-03-28 PROCEDURE — 85025 COMPLETE CBC W/AUTO DIFF WBC: CPT

## 2023-03-28 PROCEDURE — 83690 ASSAY OF LIPASE: CPT

## 2023-03-28 PROCEDURE — 80053 COMPREHEN METABOLIC PANEL: CPT

## 2023-03-28 PROCEDURE — 84484 ASSAY OF TROPONIN QUANT: CPT

## 2023-03-28 ASSESSMENT — PAIN SCALES - GENERAL
PAINLEVEL_OUTOF10: 4
PAINLEVEL_OUTOF10: 3

## 2023-03-28 ASSESSMENT — HEART SCORE: ECG: 0

## 2023-03-28 NOTE — ED NOTES
Pt presents to the EMS entrance with complaints of chest discomfort, RUQ abdominal pain, and midline back pain. Taken immediately to Trauma 2 for assessment. EKG performed. Cardiac monitor applied. Assessment per computer. Assisted into gown. Angella Segal was asleep at home and the pain woke her up. It was 6/10 and was causing her to become nauseated. She is experiencing more stress than usual but she has never had this kind of pain before.       Denisse Alva RN  03/28/23 7236

## 2023-03-28 NOTE — DISCHARGE INSTRUCTIONS
Rest and monitor at home. Follow-up with cardiology on Thursday as scheduled. If you develop shortness of breath, worsening symptoms or progression please proceed to nearest for hospital for further cardiac evaluation and testing and diagnostic work-up. Otherwise rest and monitor at home. You may try GI medicine like Prilosec or Mylanta over-the-counter. Decrease Excedrin and/or aspirin use. Avoid other life stressors if possible.

## 2023-03-28 NOTE — ED NOTES
Discharge teaching and instructions for condition explained to patient. AVS reviewed. Patient voiced understanding regarding follow up appointments and care of self at home. Pt discharged to home in stable condition per self.        Prasanna Fuller RN  03/28/23 4114

## 2023-03-28 NOTE — ED PROVIDER NOTES
follow-up  Thursday as scheduled    Follow up from ER condition    DISCHARGE MEDICATIONS:  New Prescriptions    No medications on file       (Please note that portions of this note were completed with a voice recognition program.  Efforts were made to edit the dictations but occasionally words are mis-transcribed.)    Don Davis MD (electronically signed)  Attending Emergency Physician                      Don Davis MD  03/28/23 6656

## 2023-03-29 ENCOUNTER — OFFICE VISIT (OUTPATIENT)
Dept: CARDIOLOGY CLINIC | Age: 48
End: 2023-03-29
Payer: COMMERCIAL

## 2023-03-29 ENCOUNTER — TELEPHONE (OUTPATIENT)
Dept: FAMILY MEDICINE CLINIC | Age: 48
End: 2023-03-29

## 2023-03-29 VITALS
SYSTOLIC BLOOD PRESSURE: 142 MMHG | WEIGHT: 293 LBS | BODY MASS INDEX: 45.99 KG/M2 | HEIGHT: 67 IN | DIASTOLIC BLOOD PRESSURE: 86 MMHG | HEART RATE: 80 BPM

## 2023-03-29 DIAGNOSIS — G47.30 SLEEP APNEA, UNSPECIFIED TYPE: ICD-10-CM

## 2023-03-29 DIAGNOSIS — R07.89 CHEST PAIN, ATYPICAL: Primary | ICD-10-CM

## 2023-03-29 LAB
EKG ATRIAL RATE: 92 BPM
EKG P AXIS: 44 DEGREES
EKG P-R INTERVAL: 168 MS
EKG Q-T INTERVAL: 378 MS
EKG QRS DURATION: 86 MS
EKG QTC CALCULATION (BAZETT): 467 MS
EKG R AXIS: 19 DEGREES
EKG T AXIS: 52 DEGREES
EKG VENTRICULAR RATE: 92 BPM

## 2023-03-29 PROCEDURE — G8484 FLU IMMUNIZE NO ADMIN: HCPCS | Performed by: INTERNAL MEDICINE

## 2023-03-29 PROCEDURE — 3077F SYST BP >= 140 MM HG: CPT | Performed by: INTERNAL MEDICINE

## 2023-03-29 PROCEDURE — G8417 CALC BMI ABV UP PARAM F/U: HCPCS | Performed by: INTERNAL MEDICINE

## 2023-03-29 PROCEDURE — 1036F TOBACCO NON-USER: CPT | Performed by: INTERNAL MEDICINE

## 2023-03-29 PROCEDURE — 93010 ELECTROCARDIOGRAM REPORT: CPT | Performed by: INTERNAL MEDICINE

## 2023-03-29 PROCEDURE — G8428 CUR MEDS NOT DOCUMENT: HCPCS | Performed by: INTERNAL MEDICINE

## 2023-03-29 PROCEDURE — 99204 OFFICE O/P NEW MOD 45 MIN: CPT | Performed by: INTERNAL MEDICINE

## 2023-03-29 PROCEDURE — 3079F DIAST BP 80-89 MM HG: CPT | Performed by: INTERNAL MEDICINE

## 2023-03-29 NOTE — PROGRESS NOTES
Nw patient here for check up chest pain     Pt continues with chest pain, describes as a band around chest , pressure and pain, rates pain 3/10, has now, dizziness, heart palpitations at times, describes as skipping beats a couple times a month     Pt denies sob,     Pt states med list is correct from appt yesterday
03/28/2023 02:05 AM    MCHC 33.9 03/28/2023 02:05 AM    RDW 13.0 03/28/2023 02:05 AM     03/28/2023 02:05 AM    MPV 9.2 03/28/2023 02:05 AM       Lab Results   Component Value Date/Time     03/28/2023 02:05 AM    K 3.6 03/28/2023 02:05 AM    K 4.2 03/10/2018 06:12 AM     03/28/2023 02:05 AM    CO2 30 03/28/2023 02:05 AM    BUN 14 03/28/2023 02:05 AM    LABALBU 3.5 03/28/2023 02:05 AM    LABALBU 3.8 05/12/2012 03:40 PM    CREATININE 0.7 03/28/2023 02:05 AM    CALCIUM 9.1 09/08/2022 06:55 AM    LABGLOM 90 09/08/2022 06:55 AM    GLUCOSE 95 03/28/2023 02:05 AM    GLUCOSE 87 05/12/2012 03:40 PM       Lab Results   Component Value Date/Time    ALKPHOS 78 03/28/2023 02:05 AM    ALT 30 03/28/2023 02:05 AM    AST 20 03/28/2023 02:05 AM    PROT 6.6 03/28/2023 02:05 AM    BILITOT 0.5 03/28/2023 02:05 AM    BILITOT NEGATIVE 08/23/2011 03:30 PM    LABALBU 3.5 03/28/2023 02:05 AM    LABALBU 3.8 05/12/2012 03:40 PM       Lab Results   Component Value Date/Time    MG 2.0 02/12/2014 09:47 AM       Lab Results   Component Value Date    INR 1.03 10/18/2019    INR 1.00 01/12/2012         Lab Results   Component Value Date/Time    LABA1C 4.9 10/05/2017 09:16 AM       Lab Results   Component Value Date/Time    TRIG 75 09/08/2022 06:55 AM    HDL 51 09/08/2022 06:55 AM    LDLCALC 107 09/08/2022 06:55 AM       Lab Results   Component Value Date/Time    TSH 1.390 09/08/2022 06:55 AM         Testing Reviewed:      I haveindividually reviewed the below cardiac tests    EKG:    ECHO: No results found for this or any previous visit. STRESS:    CATH:    Assessment/Plan       Diagnosis Orders   1. Chest pain, atypical          Chest pain atypical  BMI 47  HTN  S/p gastric sleeve    Reviewed EKG  Consider sleep study  Will get CTA coronary to evaluate for CAD  The patient is asked to make an attempt to improve diet and exercise patterns to aid in medical management of this problem.   Advised more plant based

## 2023-05-10 ENCOUNTER — OFFICE VISIT (OUTPATIENT)
Dept: CARDIOLOGY CLINIC | Age: 48
End: 2023-05-10
Payer: COMMERCIAL

## 2023-05-10 VITALS
WEIGHT: 293 LBS | DIASTOLIC BLOOD PRESSURE: 82 MMHG | SYSTOLIC BLOOD PRESSURE: 144 MMHG | BODY MASS INDEX: 45.99 KG/M2 | HEIGHT: 67 IN | HEART RATE: 76 BPM

## 2023-05-10 DIAGNOSIS — I25.10 ASCVD (ARTERIOSCLEROTIC CARDIOVASCULAR DISEASE): Primary | ICD-10-CM

## 2023-05-10 PROCEDURE — 1036F TOBACCO NON-USER: CPT | Performed by: INTERNAL MEDICINE

## 2023-05-10 PROCEDURE — 99213 OFFICE O/P EST LOW 20 MIN: CPT | Performed by: INTERNAL MEDICINE

## 2023-05-10 PROCEDURE — 3079F DIAST BP 80-89 MM HG: CPT | Performed by: INTERNAL MEDICINE

## 2023-05-10 PROCEDURE — 3077F SYST BP >= 140 MM HG: CPT | Performed by: INTERNAL MEDICINE

## 2023-05-10 PROCEDURE — G8417 CALC BMI ABV UP PARAM F/U: HCPCS | Performed by: INTERNAL MEDICINE

## 2023-05-10 PROCEDURE — G8427 DOCREV CUR MEDS BY ELIG CLIN: HCPCS | Performed by: INTERNAL MEDICINE

## 2023-05-10 NOTE — PROGRESS NOTES
Pt here for 1 mo check up     Pt denies any chest pain since last visit,     Pt continues with heart palpitations , noticed 3 times in the last month, (3) no apparent problem

## 2023-05-10 NOTE — PROGRESS NOTES
[address] [facility], [place] [address]  Dept: [phone]  Dept Fax: [phone]  Loc: [phone]    Visit Date: 5/10/2023    Ms. Nell Onofre is a 53 y.o. female  who presented for:  Chief Complaint   Patient presents with    Check-Up       HPI:   52 yo F c hx of HTN, headaches, s/p gastric sleeve, YIFAN s here for evaluation of chest pain. As per patient she had chest pain was few days ago, right sided, radiated to the back and feels like a band around the chest pain, not shortness of breath. Does have stress at home. No more recent episodes of chest pain. Echo from 2018 EF 60%, RVSP 40mm Hg. She underwent coronary CTA and is here to follow up.          Current Outpatient Medications:     Tirzepatide (MOUNJARO) 10 MG/0.5ML SOPN SC injection, Inject 0.5 mLs into the skin once a week, Disp: 2 mL, Rfl: 5    ondansetron (ZOFRAN ODT) 4 MG disintegrating tablet, Take 1 tablet by mouth every 8 hours as needed for Nausea or Vomiting, Disp: 45 tablet, Rfl: 2    lisinopril-hydroCHLOROthiazide (PRINZIDE;ZESTORETIC) 20-25 MG per tablet, take 1 tablet by mouth once daily, Disp: 90 tablet, Rfl: 3    escitalopram (LEXAPRO) 20 MG tablet, take 1 tablet by mouth once daily, Disp: 90 tablet, Rfl: 3    busPIRone (BUSPAR) 7.5 MG tablet, take 1 tablet by mouth twice a day, Disp: 180 tablet, Rfl: 3    butalbital-acetaminophen-caffeine (FIORICET, ESGIC) -40 MG per tablet, Take 1 tablet by mouth every 4 hours as needed for Headaches or Migraine, Disp: 60 tablet, Rfl: 3    lansoprazole (PREVACID) 30 MG delayed release capsule, Take 1 capsule by mouth daily, Disp: 30 capsule, Rfl: 0    levothyroxine (SYNTHROID) 75 MCG tablet, 4 tablets daily, Disp: , Rfl:     vitamin B-12 (CYANOCOBALAMIN) 1000 MCG tablet, Take 1 tablet by mouth daily, Disp: , Rfl:     Cholecalciferol (VITAMIN D3) [address] UNITS CAPS, Take 1 capsule by mouth once a week, Disp: 4 capsule, Rfl: 1

## 2023-05-30 ENCOUNTER — PATIENT MESSAGE (OUTPATIENT)
Dept: FAMILY MEDICINE CLINIC | Age: 48
End: 2023-05-30

## 2023-05-30 DIAGNOSIS — E66.01 MORBID OBESITY DUE TO EXCESS CALORIES (HCC): Primary | ICD-10-CM

## 2023-05-31 NOTE — TELEPHONE ENCOUNTER
From: Dale Mendez  To: Dr. Arnoldo Hernandez: 5/30/2023 10:41 PM EDT  Subject: Milton Cueva,  I am tolerating 10mg, and not having issues getting the medication. Can you please send a script to [facility] for 12.5 or 15 MG , with refills please? I'm not sure how long in June I will be able to use the coupon card, so thinking ahead if I need to take a stronger dose but maybe space it out to 10 days instead of 7 to make it last??   Thank you for your time  Rena Alvarado

## 2023-06-19 ENCOUNTER — NURSE ONLY (OUTPATIENT)
Dept: LAB | Age: 48
End: 2023-06-19

## 2023-06-22 LAB — CYTOLOGY THIN PREP PAP: NORMAL

## 2023-06-23 ENCOUNTER — OFFICE VISIT (OUTPATIENT)
Dept: FAMILY MEDICINE CLINIC | Age: 48
End: 2023-06-23
Payer: COMMERCIAL

## 2023-06-23 VITALS
BODY MASS INDEX: 48.23 KG/M2 | DIASTOLIC BLOOD PRESSURE: 80 MMHG | RESPIRATION RATE: 18 BRPM | OXYGEN SATURATION: 96 % | TEMPERATURE: 96.9 F | SYSTOLIC BLOOD PRESSURE: 130 MMHG | HEIGHT: 67 IN | HEART RATE: 81 BPM

## 2023-06-23 DIAGNOSIS — J40 BRONCHITIS: Primary | ICD-10-CM

## 2023-06-23 DIAGNOSIS — R06.02 SHORTNESS OF BREATH: ICD-10-CM

## 2023-06-23 DIAGNOSIS — R53.83 OTHER FATIGUE: ICD-10-CM

## 2023-06-23 DIAGNOSIS — R05.8 PRODUCTIVE COUGH: ICD-10-CM

## 2023-06-23 PROCEDURE — 3074F SYST BP LT 130 MM HG: CPT

## 2023-06-23 PROCEDURE — G8417 CALC BMI ABV UP PARAM F/U: HCPCS

## 2023-06-23 PROCEDURE — 1036F TOBACCO NON-USER: CPT

## 2023-06-23 PROCEDURE — G8427 DOCREV CUR MEDS BY ELIG CLIN: HCPCS

## 2023-06-23 PROCEDURE — 3078F DIAST BP <80 MM HG: CPT

## 2023-06-23 PROCEDURE — 99213 OFFICE O/P EST LOW 20 MIN: CPT

## 2023-06-23 RX ORDER — PREDNISONE 10 MG/1
TABLET ORAL
COMMUNITY
Start: 2023-06-20

## 2023-06-23 RX ORDER — AMOXICILLIN 875 MG/1
TABLET, COATED ORAL
COMMUNITY
Start: 2023-06-20

## 2023-06-23 RX ORDER — DOXYCYCLINE HYCLATE 100 MG
100 TABLET ORAL 2 TIMES DAILY
Qty: 20 TABLET | Refills: 0 | Status: SHIPPED | OUTPATIENT
Start: 2023-06-23 | End: 2023-07-03

## 2023-06-23 RX ORDER — ALBUTEROL SULFATE 90 UG/1
AEROSOL, METERED RESPIRATORY (INHALATION)
COMMUNITY
Start: 2023-06-20

## 2023-06-23 RX ORDER — LORAZEPAM 1 MG/1
1 TABLET ORAL 2 TIMES DAILY PRN
COMMUNITY
Start: 2023-04-03

## 2023-06-23 NOTE — PROGRESS NOTES
[address] [place] [address] DR. [place] [address]  Dept: [phone]  Loc: [address] (:  1975) is a 53 y.o. female, here for evaluation of the following chief complaint(s):  Congestion (Dry hacky cough ongoing since [place] day, has used OTC products with no relief. //Strep negative- Tuesday at [address])      ASSESSMENT/PLAN:  1. Bronchitis  -     XR CHEST STANDARD (2 VW); Future  -     doxycycline hyclate (VIBRA-TABS) 100 MG tablet; Take 1 tablet by mouth 2 times daily for 10 days, Disp-20 tablet, R-0Normal  2. Productive cough  -     XR CHEST STANDARD (2 VW); Future  3. Other fatigue  -     XR CHEST STANDARD (2 VW); Future  4. Shortness of breath  -     XR CHEST STANDARD (2 VW); Future    Switch Amoxicillin to Doxycyline as she has continued to worsen on Amoxicillin. Will obtain chest xray due to worsening symptoms including SOB and cough as well as due to her hx of pneumonia. Reviewed over-the-counter symptomatic relievers. Push fluids. Rest.  Educated on signs of worsening illness and when to seek further care. Return for As needed or if symptoms don't improve. SUBJECTIVE/OBJECTIVE:  EVIE Bullock presents today for concerns of worsening cough and SOB. She states that around [place] day she developed a dry, harsh cough. Using OTC medication such as Mucinex DM and Coriciden HBP she was able to alleviate some of the symptoms. Then last weekend her symptoms worsened with a frequent productive cough, sore throat, and shortness of breath. She states it is worse at night. She went to  on Tuesday and tested negative for strep. Was started on taper prednisone for which she has now completed, Amoxicillin for which she has been taking for 3 days, and as need albuterol inhaler for which she has used a few times each day. Her cough and SOB has worsened. Improved sore throat.  NO fever, nasal congestion,

## 2023-06-25 ASSESSMENT — ENCOUNTER SYMPTOMS
EYE REDNESS: 0
NAUSEA: 0
WHEEZING: 0
VOMITING: 0
SINUS PAIN: 0
SINUS PRESSURE: 0
SORE THROAT: 1
SHORTNESS OF BREATH: 1
COUGH: 1
COLOR CHANGE: 0
CHEST TIGHTNESS: 0
DIARRHEA: 0
ABDOMINAL PAIN: 0
EYE DISCHARGE: 0

## 2023-08-29 ENCOUNTER — OFFICE VISIT (OUTPATIENT)
Dept: FAMILY MEDICINE CLINIC | Age: 48
End: 2023-08-29
Payer: COMMERCIAL

## 2023-08-29 VITALS
RESPIRATION RATE: 18 BRPM | HEART RATE: 76 BPM | DIASTOLIC BLOOD PRESSURE: 86 MMHG | WEIGHT: 293 LBS | SYSTOLIC BLOOD PRESSURE: 124 MMHG | OXYGEN SATURATION: 98 % | BODY MASS INDEX: 47.54 KG/M2

## 2023-08-29 DIAGNOSIS — E61.1 LOW IRON: ICD-10-CM

## 2023-08-29 DIAGNOSIS — F41.1 GENERALIZED ANXIETY DISORDER: ICD-10-CM

## 2023-08-29 DIAGNOSIS — J01.10 ACUTE NON-RECURRENT FRONTAL SINUSITIS: ICD-10-CM

## 2023-08-29 DIAGNOSIS — E66.01 MORBID OBESITY DUE TO EXCESS CALORIES (HCC): ICD-10-CM

## 2023-08-29 DIAGNOSIS — I10 ESSENTIAL HYPERTENSION: Primary | ICD-10-CM

## 2023-08-29 DIAGNOSIS — G43.909 MIGRAINE WITHOUT STATUS MIGRAINOSUS, NOT INTRACTABLE, UNSPECIFIED MIGRAINE TYPE: ICD-10-CM

## 2023-08-29 DIAGNOSIS — Z13.220 SCREENING FOR LIPID DISORDERS: ICD-10-CM

## 2023-08-29 PROCEDURE — 96372 THER/PROPH/DIAG INJ SC/IM: CPT | Performed by: NURSE PRACTITIONER

## 2023-08-29 PROCEDURE — 1036F TOBACCO NON-USER: CPT | Performed by: NURSE PRACTITIONER

## 2023-08-29 PROCEDURE — 3078F DIAST BP <80 MM HG: CPT | Performed by: NURSE PRACTITIONER

## 2023-08-29 PROCEDURE — G8417 CALC BMI ABV UP PARAM F/U: HCPCS | Performed by: NURSE PRACTITIONER

## 2023-08-29 PROCEDURE — G8427 DOCREV CUR MEDS BY ELIG CLIN: HCPCS | Performed by: NURSE PRACTITIONER

## 2023-08-29 PROCEDURE — 99214 OFFICE O/P EST MOD 30 MIN: CPT | Performed by: NURSE PRACTITIONER

## 2023-08-29 PROCEDURE — 3074F SYST BP LT 130 MM HG: CPT | Performed by: NURSE PRACTITIONER

## 2023-08-29 RX ORDER — PANTOPRAZOLE SODIUM 40 MG/1
40 TABLET, DELAYED RELEASE ORAL
Qty: 30 TABLET | Refills: 3 | Status: SHIPPED | OUTPATIENT
Start: 2023-08-29

## 2023-08-29 RX ORDER — TRIAMCINOLONE ACETONIDE 40 MG/ML
60 INJECTION, SUSPENSION INTRA-ARTICULAR; INTRAMUSCULAR ONCE
Status: COMPLETED | OUTPATIENT
Start: 2023-08-29 | End: 2023-08-29

## 2023-08-29 RX ORDER — LORAZEPAM 1 MG/1
1 TABLET ORAL 2 TIMES DAILY PRN
Qty: 30 TABLET | Refills: 0 | Status: SHIPPED | OUTPATIENT
Start: 2023-08-29 | End: 2023-09-28

## 2023-08-29 RX ORDER — SUMATRIPTAN 50 MG/1
50 TABLET, FILM COATED ORAL
Qty: 9 TABLET | Refills: 0 | Status: SHIPPED | OUTPATIENT
Start: 2023-08-29 | End: 2023-08-29

## 2023-08-29 RX ADMIN — TRIAMCINOLONE ACETONIDE 60 MG: 40 INJECTION, SUSPENSION INTRA-ARTICULAR; INTRAMUSCULAR at 16:17

## 2023-08-29 ASSESSMENT — ENCOUNTER SYMPTOMS
ABDOMINAL PAIN: 0
ABDOMINAL DISTENTION: 0
STRIDOR: 0
WHEEZING: 0
COLOR CHANGE: 0
VOMITING: 0
COUGH: 0
CONSTIPATION: 0
BACK PAIN: 0
DIARRHEA: 0
SORE THROAT: 0
NAUSEA: 0
SHORTNESS OF BREATH: 0
CHEST TIGHTNESS: 0

## 2023-08-29 NOTE — PROGRESS NOTES
After obtaining consent, and per orders of Yanira Cabrera injection  by Vibha Chinchilla MA. Patient instructed to remain in clinic for 20 minutes afterwards, and to report any adverse reaction to me immediately. Administrations This Visit       triamcinolone acetonide (KENALOG-40) injection 60 mg       Admin Date  08/29/2023  16:17 Action  Given Dose  60 mg Route  IntraMUSCular Site  Dorsogluteal Right Administered By  Vibha Chinchilla MA    Ordering Provider: NICO Argueta CNP    NDC: 7762-9116-54    Lot#: [id number]    : [employer] [place] (PRIMARY CARE)    Patient Supplied?: No                    Patient tolerated well.

## 2023-08-29 NOTE — PROGRESS NOTES
Dalila Drummond (:  1975) is a 53 y.o. female,Established patient, here for evaluation of the following chief complaint(s):  Migraine (X 3 weeks medication not helping- fierocet not helping), Other (Iron and protein low at [employer]), and Gastroesophageal Reflux (On mounjaro hasn't been taking the last two weeks - could this be from stopping mounjaro)         ASSESSMENT/PLAN:  1. Essential hypertension  -     Comprehensive Metabolic Panel; Future  -     CBC with Auto Differential; Future  2. Migraine without status migrainosus, not intractable, unspecified migraine type  3. Generalized anxiety disorder  -     LORazepam (ATIVAN) 1 MG tablet; Take 1 tablet by mouth 2 times daily as needed for Anxiety for up to 30 days. Max Daily Amount: 2 mg, Disp-30 tablet, R-0Normal  4. Morbid obesity due to excess calories ([address])  5. Screening for lipid disorders  -     Lipid Panel; Future  6. Low iron  -     Ferritin; Future  -     Iron; Future  -     Iron Binding Capacity; Future  -     Iron Saturation; Future  7. Acute non-recurrent frontal sinusitis  -     triamcinolone acetonide (KENALOG-40) injection 60 mg; 60 mg, IntraMUSCular, ONCE, 1 dose, On 23 at [address] to hold mounjaro due to side effects    Imitrex for migraine . Kenalog injection for sinusitis    Iron levels due to hx of anemia    Switch prevacid to protonix    Htn stable on current meds. Return in about 4 weeks (around 2023). Subjective   SUBJECTIVE/OBJECTIVE:    Cant get mounjaro. Also GERD was bad on it. Also quit prevacid. Migraine the last 3 weeks. Not letting up. Fioricet not helping at all. Usually last a day or so. Never been on anything else. No other symptoms. Pain right forehead and temple. Usually a band across the forehead. Has been under some stress. Donates plasma. Iron low and protein. Could give last couple weeks. Blood pressure running good on meds.   No

## 2023-08-31 ENCOUNTER — NURSE ONLY (OUTPATIENT)
Dept: LAB | Age: 48
End: 2023-08-31

## 2023-08-31 DIAGNOSIS — Z13.220 SCREENING FOR LIPID DISORDERS: ICD-10-CM

## 2023-08-31 DIAGNOSIS — E61.1 LOW IRON: ICD-10-CM

## 2023-08-31 DIAGNOSIS — I10 ESSENTIAL HYPERTENSION: ICD-10-CM

## 2023-08-31 LAB
ALBUMIN SERPL BCG-MCNC: 3.9 G/DL (ref 3.5–5.1)
ALP SERPL-CCNC: 63 U/L (ref 38–126)
ALT SERPL W/O P-5'-P-CCNC: 13 U/L (ref 11–66)
ANION GAP SERPL CALC-SCNC: 11 MEQ/L (ref 8–16)
AST SERPL-CCNC: 16 U/L (ref 5–40)
BASOPHILS ABSOLUTE: 0 THOU/MM3 (ref 0–0.1)
BASOPHILS NFR BLD AUTO: 0.4 %
BILIRUB SERPL-MCNC: 0.3 MG/DL (ref 0.3–1.2)
BUN SERPL-MCNC: 12 MG/DL (ref 7–22)
CALCIUM SERPL-MCNC: 9.2 MG/DL (ref 8.5–10.5)
CHLORIDE SERPL-SCNC: 106 MEQ/L (ref 98–111)
CHOLEST SERPL-MCNC: 139 MG/DL (ref 100–199)
CO2 SERPL-SCNC: 28 MEQ/L (ref 23–33)
CREAT SERPL-MCNC: 0.5 MG/DL (ref 0.4–1.2)
DEPRECATED RDW RBC AUTO: 46.5 FL (ref 35–45)
EOSINOPHIL NFR BLD AUTO: 2.5 %
EOSINOPHILS ABSOLUTE: 0.2 THOU/MM3 (ref 0–0.4)
ERYTHROCYTE [DISTWIDTH] IN BLOOD BY AUTOMATED COUNT: 13.1 % (ref 11.5–14.5)
FERRITIN SERPL IA-MCNC: 69 NG/ML (ref 10–291)
GFR SERPL CREATININE-BSD FRML MDRD: > 60 ML/MIN/1.73M2
GLUCOSE SERPL-MCNC: 85 MG/DL (ref 70–108)
HCT VFR BLD AUTO: 40.3 % (ref 37–47)
HDLC SERPL-MCNC: 50 MG/DL
HGB BLD-MCNC: 13.1 GM/DL (ref 12–16)
IMM GRANULOCYTES # BLD AUTO: 0.02 THOU/MM3 (ref 0–0.07)
IMM GRANULOCYTES NFR BLD AUTO: 0.3 %
IRON SATN MFR SERPL: 32 % (ref 20–50)
IRON SERPL-MCNC: 79 UG/DL (ref 50–170)
LDLC SERPL CALC-MCNC: 79 MG/DL
LYMPHOCYTES ABSOLUTE: 2.4 THOU/MM3 (ref 1–4.8)
LYMPHOCYTES NFR BLD AUTO: 32.9 %
MCH RBC QN AUTO: 31.3 PG (ref 26–33)
MCHC RBC AUTO-ENTMCNC: 32.5 GM/DL (ref 32.2–35.5)
MCV RBC AUTO: 96.4 FL (ref 81–99)
MONOCYTES ABSOLUTE: 0.4 THOU/MM3 (ref 0.4–1.3)
MONOCYTES NFR BLD AUTO: 6 %
NEUTROPHILS NFR BLD AUTO: 57.9 %
NRBC BLD AUTO-RTO: 0 /100 WBC
PLATELET # BLD AUTO: 301 THOU/MM3 (ref 130–400)
PMV BLD AUTO: 10.5 FL (ref 9.4–12.4)
POTASSIUM SERPL-SCNC: 4.1 MEQ/L (ref 3.5–5.2)
PROT SERPL-MCNC: 6.2 G/DL (ref 6.1–8)
RBC # BLD AUTO: 4.18 MILL/MM3 (ref 4.2–5.4)
SEGMENTED NEUTROPHILS ABSOLUTE COUNT: 4.2 THOU/MM3 (ref 1.8–7.7)
SODIUM SERPL-SCNC: 145 MEQ/L (ref 135–145)
TIBC SERPL-MCNC: 249 UG/DL (ref 171–450)
TRIGL SERPL-MCNC: 50 MG/DL (ref 0–199)
WBC # BLD AUTO: 7.2 THOU/MM3 (ref 4.8–10.8)

## 2023-09-04 ASSESSMENT — ENCOUNTER SYMPTOMS: SINUS PRESSURE: 1

## 2023-09-20 ENCOUNTER — OFFICE VISIT (OUTPATIENT)
Dept: FAMILY MEDICINE CLINIC | Age: 48
End: 2023-09-20
Payer: COMMERCIAL

## 2023-09-20 VITALS
HEIGHT: 67 IN | HEART RATE: 73 BPM | RESPIRATION RATE: 16 BRPM | WEIGHT: 293 LBS | DIASTOLIC BLOOD PRESSURE: 84 MMHG | OXYGEN SATURATION: 97 % | SYSTOLIC BLOOD PRESSURE: 134 MMHG | BODY MASS INDEX: 45.99 KG/M2

## 2023-09-20 DIAGNOSIS — E66.01 MORBID OBESITY DUE TO EXCESS CALORIES (HCC): ICD-10-CM

## 2023-09-20 DIAGNOSIS — G43.909 MIGRAINE WITHOUT STATUS MIGRAINOSUS, NOT INTRACTABLE, UNSPECIFIED MIGRAINE TYPE: Primary | ICD-10-CM

## 2023-09-20 DIAGNOSIS — F41.1 GENERALIZED ANXIETY DISORDER: ICD-10-CM

## 2023-09-20 DIAGNOSIS — I10 ESSENTIAL HYPERTENSION: ICD-10-CM

## 2023-09-20 PROCEDURE — G8417 CALC BMI ABV UP PARAM F/U: HCPCS | Performed by: FAMILY MEDICINE

## 2023-09-20 PROCEDURE — 3079F DIAST BP 80-89 MM HG: CPT | Performed by: FAMILY MEDICINE

## 2023-09-20 PROCEDURE — G8427 DOCREV CUR MEDS BY ELIG CLIN: HCPCS | Performed by: FAMILY MEDICINE

## 2023-09-20 PROCEDURE — 3075F SYST BP GE 130 - 139MM HG: CPT | Performed by: FAMILY MEDICINE

## 2023-09-20 PROCEDURE — 99214 OFFICE O/P EST MOD 30 MIN: CPT | Performed by: FAMILY MEDICINE

## 2023-09-20 PROCEDURE — 1036F TOBACCO NON-USER: CPT | Performed by: FAMILY MEDICINE

## 2023-09-20 RX ORDER — SUMATRIPTAN 100 MG/1
100 TABLET, FILM COATED ORAL DAILY PRN
Qty: 9 TABLET | Refills: 3 | Status: SHIPPED | OUTPATIENT
Start: 2023-09-20

## 2023-09-20 RX ORDER — SUMATRIPTAN 50 MG/1
50 TABLET, FILM COATED ORAL DAILY PRN
Qty: 9 TABLET | Refills: 3 | Status: CANCELLED | OUTPATIENT
Start: 2023-09-20

## 2023-09-20 ASSESSMENT — ENCOUNTER SYMPTOMS
NAUSEA: 0
DIARRHEA: 0
ABDOMINAL PAIN: 0
CONSTIPATION: 0
SORE THROAT: 0
RHINORRHEA: 0
SHORTNESS OF BREATH: 0
WHEEZING: 0
COUGH: 0

## 2023-09-20 NOTE — PROGRESS NOTES
[address] [facility] MEDICINE  [address] [place] [address]  Dept: [phone]  Loc: Rosanna Infante (:  1975) is a 53 y.o. female, here for evaluation of the following chief complaint(s):  Follow-up (Go over labs, iron and protein has been low when she goes to donate. )      ASSESSMENT/PLAN:  1. Migraine without status migrainosus, not intractable, unspecified migraine type  -     SUMAtriptan (IMITREX) 100 MG tablet; Take 1 tablet by mouth daily as needed for Migraine, Disp-9 tablet, R-3Normal  2. Essential hypertension  3. Generalized anxiety disorder  4. Morbid obesity due to excess calories (HCC)    Increase imitrex to 100mg prn  Bp controlled  Anxiety stable  Consider restarting mounjaro that she has left if tolerates nausea  No follow-ups on file. SUBJECTIVE/OBJECTIVE:  HPI  Pt presents to review labs and discuss chronic conditions including migraine, blood pressure, obesity, anxiety. First, patient was declined giving blood recently due to reportedly low iron. She had extensive lab work which was all normal including iron studies and CBC. She denies any lightheadedness. Blood pressure has been controlled with her current regimen. She does state that her anxiety has been stable. She does have some stress but believes her meds are good. She does have obesity in the past was on [place] however unfortunately she has not been able to get it since  due to cost.  She does mention that she has a few doses of Mounjaro left over at the 10 mg but mentions that this made her very nauseated and she had episodes of vomiting. The vomiting stopped after she stopped the [place]. She is unsure if she should restart it or not. She did mention that she has gained weight back since stopping the [place].   Next, she still has headaches and states Imitrex helps somewhat but she wonders if something else could help more as she

## 2023-10-18 DIAGNOSIS — F41.1 GENERALIZED ANXIETY DISORDER: ICD-10-CM

## 2023-10-18 RX ORDER — LORAZEPAM 0.5 MG/1
0.5 TABLET ORAL 2 TIMES DAILY PRN
Qty: 30 TABLET | Refills: 2 | Status: SHIPPED | OUTPATIENT
Start: 2023-10-18 | End: 2023-11-17

## 2023-10-18 NOTE — TELEPHONE ENCOUNTER
----- Message from [employer] sent at 10/17/2023  9:47 PM EDT -----  Regarding: Ativan refill  Contact: [phone]  Yes, [facility] in [facility].   Thank you   Eusebio Wick

## 2023-10-30 ENCOUNTER — HOSPITAL ENCOUNTER (OUTPATIENT)
Age: 48
Discharge: HOME | End: 2023-10-30
Payer: COMMERCIAL

## 2023-10-30 LAB — TSH SERPL DL<=0.005 MIU/L-ACNC: 0.42 UIU/ML (ref 0.4–4.2)

## 2023-10-30 PROCEDURE — 84436 ASSAY OF TOTAL THYROXINE: CPT

## 2023-10-30 PROCEDURE — 84443 ASSAY THYROID STIM HORMONE: CPT

## 2023-10-30 PROCEDURE — 36415 COLL VENOUS BLD VENIPUNCTURE: CPT

## 2023-10-31 LAB — T4 SERPL-MCNC: 8.1 UG/DL (ref 4.5–11.7)

## 2023-12-01 ENCOUNTER — PATIENT MESSAGE (OUTPATIENT)
Dept: FAMILY MEDICINE CLINIC | Age: 48
End: 2023-12-01

## 2023-12-01 DIAGNOSIS — E66.01 MORBID OBESITY WITH BMI OF 50.0-59.9, ADULT (HCC): Primary | ICD-10-CM

## 2023-12-27 RX ORDER — PANTOPRAZOLE SODIUM 40 MG/1
40 TABLET, DELAYED RELEASE ORAL
Qty: 30 TABLET | Refills: 3 | Status: SHIPPED | OUTPATIENT
Start: 2023-12-27

## 2023-12-28 DIAGNOSIS — F41.1 GENERALIZED ANXIETY DISORDER: ICD-10-CM

## 2023-12-28 RX ORDER — BUSPIRONE HYDROCHLORIDE 7.5 MG/1
TABLET ORAL
Qty: 180 TABLET | Refills: 3 | Status: SHIPPED | OUTPATIENT
Start: 2023-12-28

## 2024-01-02 DIAGNOSIS — G43.909 MIGRAINE WITHOUT STATUS MIGRAINOSUS, NOT INTRACTABLE, UNSPECIFIED MIGRAINE TYPE: ICD-10-CM

## 2024-01-02 RX ORDER — SUMATRIPTAN 100 MG/1
100 TABLET, FILM COATED ORAL DAILY PRN
Qty: 9 TABLET | Refills: 3 | Status: SHIPPED | OUTPATIENT
Start: 2024-01-02

## 2024-01-23 DIAGNOSIS — F41.1 GENERALIZED ANXIETY DISORDER: ICD-10-CM

## 2024-01-24 RX ORDER — LORAZEPAM 0.5 MG/1
0.5 TABLET ORAL 2 TIMES DAILY PRN
Qty: 60 TABLET | Refills: 2 | Status: SHIPPED | OUTPATIENT
Start: 2024-01-24 | End: 2024-04-23

## 2024-01-26 ENCOUNTER — APPOINTMENT (OUTPATIENT)
Age: 49
End: 2024-01-26
Attending: STUDENT IN AN ORGANIZED HEALTH CARE EDUCATION/TRAINING PROGRAM
Payer: COMMERCIAL

## 2024-01-26 ENCOUNTER — HOSPITAL ENCOUNTER (EMERGENCY)
Age: 49
Discharge: HOME | End: 2024-01-26
Attending: EMERGENCY MEDICINE
Payer: COMMERCIAL

## 2024-01-26 ENCOUNTER — APPOINTMENT (OUTPATIENT)
Dept: CT IMAGING | Age: 49
End: 2024-01-26
Payer: COMMERCIAL

## 2024-01-26 VITALS
TEMPERATURE: 98.4 F | SYSTOLIC BLOOD PRESSURE: 118 MMHG | HEART RATE: 88 BPM | DIASTOLIC BLOOD PRESSURE: 58 MMHG | RESPIRATION RATE: 17 BRPM | BODY MASS INDEX: 43.4 KG/M2 | WEIGHT: 293 LBS | OXYGEN SATURATION: 98 % | HEIGHT: 69 IN

## 2024-01-26 DIAGNOSIS — R55 SYNCOPE AND COLLAPSE: Primary | ICD-10-CM

## 2024-01-26 LAB
ALBUMIN SERPL BCP-MCNC: 3.7 GM/DL (ref 3.4–5)
ALP SERPL-CCNC: 74 U/L (ref 46–116)
ALT SERPL W P-5'-P-CCNC: 23 U/L (ref 14–63)
ANALYZED BY:: NORMAL
ANION GAP SERPL CALC-SCNC: 9 MEQ/L (ref 8–16)
AST SERPL W P-5'-P-CCNC: 18 U/L (ref 15–37)
BASOPHILS # BLD: 0.2 % (ref 0–3)
BASOPHILS ABSOLUTE: 0 THOU/MM3 (ref 0–0.1)
BILIRUB DIRECT SERPL-MCNC: 0.09 MG/DL (ref 0–0.2)
BILIRUB SERPL-MCNC: 0.2 MG/DL (ref 0.2–1)
BUN SERPL-MCNC: 14 MG/DL (ref 7–18)
CALCIUM SERPL-MCNC: 8.7 MG/DL (ref 8.5–10.1)
CHLORIDE SERPL-SCNC: 99 MEQ/L (ref 98–107)
CO2 SERPL-SCNC: 26 MEQ/L (ref 21–32)
CREAT SERPL-MCNC: 0.9 MG/DL (ref 0.6–1.3)
DATE OF COLLECTION: NORMAL
DRAWN BY: NORMAL
EKG ATRIAL RATE: 87 BPM
EKG P AXIS: 24 DEGREES
EKG P-R INTERVAL: 150 MS
EKG Q-T INTERVAL: 376 MS
EKG QRS DURATION: 92 MS
EKG QTC CALCULATION (BAZETT): 452 MS
EKG R AXIS: -11 DEGREES
EKG T AXIS: 24 DEGREES
EKG VENTRICULAR RATE: 87 BPM
EOSINOPHILS ABSOLUTE: 0.3 THOU/MM3 (ref 0–0.5)
EOSINOPHILS RELATIVE PERCENT: 2.2 % (ref 0–4)
ETHANOL SERPL-MCNC: 0.08 % (GM/DL)
GFR SERPL CREATININE-BSD FRML MDRD: > 60 ML/MIN/1.73M2
GLUCOSE SERPL-MCNC: 98 MG/DL (ref 74–106)
HCT VFR BLD CALC: 42.3 % (ref 37–47)
HEMOGLOBIN: 13.7 GM/DL (ref 12–16)
IMMATURE GRANS (ABS): 0 THOU/MM3 (ref 0–0.07)
IMMATURE GRANULOCYTES: 0 %
LIPASE SERPL-CCNC: 41 U/L (ref 16–77)
LYMPHOCYTES # BLD AUTO: 20.3 % (ref 15–47)
LYMPHOCYTES ABSOLUTE: 2.6 THOU/MM3 (ref 1–4.8)
Lab: 1923
Lab: NORMAL
MCH RBC QN AUTO: 29.3 PG (ref 26–32)
MCHC RBC AUTO-ENTMCNC: 32.4 GM/DL (ref 31–35)
MCV RBC AUTO: 90.4 FL (ref 81–99)
MONOCYTES: 0.7 THOU/MM3 (ref 0.3–1.3)
MONOCYTES: 5.5 % (ref 0–12)
PDW BLD-RTO: 13.3 % (ref 11.5–14.9)
PLATELET # BLD AUTO: 294 THOU/MM3 (ref 130–400)
PMV BLD AUTO: 9.5 FL (ref 9.4–12.4)
POTASSIUM SERPL-SCNC: 3.5 MEQ/L (ref 3.5–5.1)
PROT SERPL-MCNC: 7.4 GM/DL (ref 6.4–8.2)
RBC # BLD: 4.68 MILL/MM3 (ref 4.1–5.3)
SEG NEUTROPHILS: 71.6 % (ref 43–75)
SEGMENTED NEUTROPHILS ABSOLUTE COUNT: 9.2 THOU/MM3 (ref 1.8–7.7)
SODIUM SERPL-SCNC: 134 MEQ/L (ref 136–145)
TROPONIN, HIGH SENSITIVITY: 4.4 PG/ML (ref 0–51.3)
WBC # BLD: 12.9 THOU/MM3 (ref 4.8–10.8)

## 2024-01-26 PROCEDURE — 96375 TX/PRO/DX INJ NEW DRUG ADDON: CPT

## 2024-01-26 PROCEDURE — 2580000003 HC RX 258: Performed by: STUDENT IN AN ORGANIZED HEALTH CARE EDUCATION/TRAINING PROGRAM

## 2024-01-26 PROCEDURE — 99284 EMERGENCY DEPT VISIT MOD MDM: CPT

## 2024-01-26 PROCEDURE — 6360000002 HC RX W HCPCS: Performed by: STUDENT IN AN ORGANIZED HEALTH CARE EDUCATION/TRAINING PROGRAM

## 2024-01-26 PROCEDURE — 84484 ASSAY OF TROPONIN QUANT: CPT

## 2024-01-26 PROCEDURE — 80048 BASIC METABOLIC PNL TOTAL CA: CPT

## 2024-01-26 PROCEDURE — 93005 ELECTROCARDIOGRAM TRACING: CPT | Performed by: EMERGENCY MEDICINE

## 2024-01-26 PROCEDURE — 82077 ASSAY SPEC XCP UR&BREATH IA: CPT

## 2024-01-26 PROCEDURE — 70450 CT HEAD/BRAIN W/O DYE: CPT

## 2024-01-26 PROCEDURE — 96374 THER/PROPH/DIAG INJ IV PUSH: CPT

## 2024-01-26 PROCEDURE — 93010 ELECTROCARDIOGRAM REPORT: CPT | Performed by: INTERNAL MEDICINE

## 2024-01-26 PROCEDURE — 6370000000 HC RX 637 (ALT 250 FOR IP): Performed by: STUDENT IN AN ORGANIZED HEALTH CARE EDUCATION/TRAINING PROGRAM

## 2024-01-26 PROCEDURE — 83690 ASSAY OF LIPASE: CPT

## 2024-01-26 PROCEDURE — 80076 HEPATIC FUNCTION PANEL: CPT

## 2024-01-26 PROCEDURE — 6360000002 HC RX W HCPCS

## 2024-01-26 PROCEDURE — 85025 COMPLETE CBC W/AUTO DIFF WBC: CPT

## 2024-01-26 RX ORDER — ACETAMINOPHEN 500 MG
1000 TABLET ORAL ONCE
Status: COMPLETED | OUTPATIENT
Start: 2024-01-26 | End: 2024-01-26

## 2024-01-26 RX ORDER — DROPERIDOL 2.5 MG/ML
1.25 INJECTION, SOLUTION INTRAMUSCULAR; INTRAVENOUS ONCE
Status: COMPLETED | OUTPATIENT
Start: 2024-01-26 | End: 2024-01-26

## 2024-01-26 RX ORDER — ONDANSETRON 2 MG/ML
4 INJECTION INTRAMUSCULAR; INTRAVENOUS ONCE
Status: COMPLETED | OUTPATIENT
Start: 2024-01-26 | End: 2024-01-26

## 2024-01-26 RX ORDER — 0.9 % SODIUM CHLORIDE 0.9 %
1000 INTRAVENOUS SOLUTION INTRAVENOUS ONCE
Status: COMPLETED | OUTPATIENT
Start: 2024-01-26 | End: 2024-01-26

## 2024-01-26 RX ORDER — 0.9 % SODIUM CHLORIDE 0.9 %
500 INTRAVENOUS SOLUTION INTRAVENOUS ONCE
Status: COMPLETED | OUTPATIENT
Start: 2024-01-26 | End: 2024-01-26

## 2024-01-26 RX ORDER — ONDANSETRON 2 MG/ML
INJECTION INTRAMUSCULAR; INTRAVENOUS
Status: COMPLETED
Start: 2024-01-26 | End: 2024-01-26

## 2024-01-26 RX ADMIN — SODIUM CHLORIDE 500 ML: 9 INJECTION, SOLUTION INTRAVENOUS at 20:54

## 2024-01-26 RX ADMIN — ONDANSETRON 4 MG: 2 INJECTION INTRAMUSCULAR; INTRAVENOUS at 19:39

## 2024-01-26 RX ADMIN — ACETAMINOPHEN 1000 MG: 500 TABLET ORAL at 20:55

## 2024-01-26 RX ADMIN — DROPERIDOL 1.25 MG: 2.5 INJECTION, SOLUTION INTRAMUSCULAR; INTRAVENOUS at 20:05

## 2024-01-26 RX ADMIN — SODIUM CHLORIDE 1000 ML: 9 INJECTION, SOLUTION INTRAVENOUS at 19:39

## 2024-01-26 ASSESSMENT — PAIN SCALES - GENERAL
PAINLEVEL_OUTOF10: 6
PAINLEVEL_OUTOF10: 6

## 2024-01-26 ASSESSMENT — PAIN - FUNCTIONAL ASSESSMENT
PAIN_FUNCTIONAL_ASSESSMENT: NONE - DENIES PAIN
PAIN_FUNCTIONAL_ASSESSMENT: 0-10

## 2024-01-26 ASSESSMENT — LIFESTYLE VARIABLES
HOW OFTEN DO YOU HAVE A DRINK CONTAINING ALCOHOL: NEVER
HOW MANY STANDARD DRINKS CONTAINING ALCOHOL DO YOU HAVE ON A TYPICAL DAY: PATIENT DOES NOT DRINK

## 2024-01-26 ASSESSMENT — PAIN DESCRIPTION - DESCRIPTORS: DESCRIPTORS: ACHING

## 2024-01-26 ASSESSMENT — PAIN DESCRIPTION - LOCATION: LOCATION: HEAD

## 2024-01-27 ENCOUNTER — HOSPITAL ENCOUNTER (OUTPATIENT)
Dept: GENERAL RADIOLOGY | Age: 49
Discharge: HOME | End: 2024-01-27
Payer: COMMERCIAL

## 2024-01-27 ENCOUNTER — HOSPITAL ENCOUNTER (OUTPATIENT)
Age: 49
End: 2024-01-27
Attending: STUDENT IN AN ORGANIZED HEALTH CARE EDUCATION/TRAINING PROGRAM
Payer: COMMERCIAL

## 2024-01-27 DIAGNOSIS — R55 SYNCOPAL EPISODES: ICD-10-CM

## 2024-01-27 PROCEDURE — 93225 XTRNL ECG REC<48 HRS REC: CPT

## 2024-01-27 NOTE — ED TRIAGE NOTES
Pt comes into ER room 2 walked from triage with friends at bedside. Pt had a light lunch today only having a salad. She was at a local bar/ pub had 3-4 beers and did not eat supper. She then slumped over and went unresponsive for a few second, she did vomit on herself but came to and has been alert and awake. She is still nauseated and her belly is very hyperactive.

## 2024-01-27 NOTE — DISCHARGE INSTRUCTIONS
Call tomorrow to get your Holter monitor.  Results to be sent to your cardiologist.  Return the emergency department immediately if you have worsening symptoms, chest pain, shortness of breath, palpitations or if you have any other concern

## 2024-01-27 NOTE — ED PROVIDER NOTES
within normal limits   TROPONIN   HEPATIC FUNCTION PANEL   LIPASE   ETHANOL   GLOMERULAR FILTRATION RATE, ESTIMATED   ANION GAP       (Any cultures that may have been sent were not resulted at the time of this patient visit)  (A negative COVID-19 test should be interpreted as COVID no longer suspected unless otherwise noted in this encounter documentation note)  MEDICAL DECISION MAKING / ED COURSE:     ED Course as of 01/26/24 2121 Fri Jan 26, 2024 2014 EKG 12 Lead  EKG shows normal sinus rhythm without acute T wave inversions or ST segment deviation.  QTc is 452.  Ventricular rate 87.  WI interval 150.  QRS 92.  There is no Brugada pattern.  No epsilon wave.  No delta wave [TM]   2015 Latvian syncope risk score is -2 indicating a very low risk, 0.7% risk of 30-day serious adverse event [TM]   2036 Glucose, Random: 98 [TM]   2044 CT HEAD WO CONTRAST  Impression:  Normal CT of the head.   [TM]      ED Course User Index  [TM] Mason Linares MD     Vitals reviewed:  ED Triage Vitals [01/26/24 1924]   BP Temp Temp Source Pulse Respirations SpO2 Height Weight - Scale   136/70 98.4 °F (36.9 °C) Oral 88 20 96 % 1.753 m (5' 9\") (!) 140.6 kg (310 lb)       Differential Diagnosis includes (but not limited to):  Vasovagal syncope,  orthostatic hypotension, dysrhythmia, anemia, ACS, SAH  , hypoglycemia, seizure, migraine  Although some of these diagnoses are unlikely, they were consider in my medical decision making.          Summary of Patient Presentation (see ED course if left blank):      Syncopal episode  Prodromal Sx suggest vasovagal syncope  Seizure less likely  Latvian syncope -2  Reassuring EKG  Trop WNL  Vitals WNL  Negative CTH  Sent home with Holter; will get placed tomorrow      ED Medications administered this visit:  (None if blank)  Medications   sodium chloride 0.9 % bolus 500 mL (500 mLs IntraVENous New Bag 1/26/24 2054)   ondansetron (ZOFRAN) injection 4 mg (4 mg IntraVENous Give PPD 1/26/24 1939)  9:21 PM

## 2024-01-27 NOTE — ED NOTES
Pt stable A&O x 3 given discharge and follow up info. Pt voiced no concerns and discharged from ER to self to home. Pt ambulated out of ER with no complications .     Treatment Number: 55 Total Body Energy: 530 mJ Protocol For Photochemotherapy: Baby Oil And Nbuvb: The patient received Photochemotherapy: Baby Oil and NBUVB (baby oil applied to all lesions prior to phototherapy). Detail Level: Zone Protocol For Photochemotherapy: Petrolatum And Broad Band Uvb: The patient received Photochemotherapy: Petrolatum and Broad Band UVB. Render Post-Care In The Note: no Protocol For Photochemotherapy For Severe Photoresponsive Dermatoses: Puva: The patient received Photochemotherapy for severe photoresponsive dermatoses: PUVA requiring at least 4 to 8 hours of care under direct physician supervision. Protocol For Uva: The patient received UVA. Protocol For Bath Puva: The patient received Bath PUVA. Protocol For Photochemotherapy: Petrolatum And Nbuvb: The patient received Photochemotherapy: Petrolatum and NBUVB (petrolatum applied to all lesions prior to phototherapy). Protocol For Photochemotherapy For Severe Photoresponsive Dermatoses: Tar And Broad Band Uvb (Goeckerman Treatment): The patient received Photochemotherapy for severe photoresponsive dermatoses: Tar and Broad Band UVB (Goeckerman treatment) requiring at least 4 to 8 hours of care under direct physician supervision. Protocol For Nb Uva: The patient received NB UVA. Protocol: Photochemotherapy: Mineral Oil and NBUVB Protocol For Broad Band Uvb: The patient received Broad Band UVB. Protocol For Photochemotherapy For Severe Photoresponsive Dermatoses: Petrolatum And Nbuvb: The patient received Photochemotherapy for severe photoresponsive dermatoses: Petrolatum and NBUVB requiring at least 4 to 8 hours of care under direct physician supervision. Protocol For Photochemotherapy For Severe Photoresponsive Dermatoses: Tar And Nbuvb (Goeckerman Treatment): The patient received Photochemotherapy for severe photoresponsive dermatoses: Tar and NBUVB (Goeckerman treatment) requiring at least 4 to 8 hours of care under direct physician supervision. Protocol For Photochemotherapy: Mineral Oil And Broad Band Uvb: The patient received Photochemotherapy: Mineral Oil and Broad Band UVB. Protocol For Photochemotherapy: Triamcinolone Ointment And Nbuvb: The patient received Photochemotherapy: Triamcinolone and NBUVB (triamcinolone ointment applied to all lesions prior to phototherapy). Changes In Treatment Protocol: Maintain due to lapse, +25 mJ Protocol For Photochemotherapy: Mineral Oil And Nbuvb: The patient received Photochemotherapy: Mineral Oil and NBUVB (mineral oil applied to all lesions prior to phototherapy). Protocol For Protocol For Photochemotherapy For Severe Photoresponsive Dermatoses: Bath Puva: The patient received Photochemotherapy for severe photoresponsive dermatoses: Bath PUVA requiring at least 4 to 8 hours of care under direct physician supervision. Post-Care Instructions: I reviewed with the patient in detail post-care instructions. Patient is to wear sun protection. Patients may expect sunburn like redness, discomfort and scabbing. Consent: Offered pt assistance with the application of mineral oil, pt deferred due to privacy reasons and prefers to apply to affected areas himself in the room. Mask worn by all staff and patient as per protocol. Patient screened negative for Covid 19 symptoms./signs today. Protocol For Nbuvb: The patient received NBUVB. Protocol For Uva1: The patient received UVA1. Protocol For Photochemotherapy: Tar And Nbuvb (Goeckerman Treatment): The patient received Photochemotherapy: Tar and NBUVB (Goeckerman treatment). Protocol For Photochemotherapy For Severe Photoresponsive Dermatoses: Petrolatum And Broad Band Uvb: The patient received Photochemotherapyfor severe photoresponsive dermatoses: Petrolatum and Broad Band UVB requiring at least 4 to 8 hours of care under direct physician supervision. Protocol For Photochemotherapy: Tar And Broad Band Uvb (Goeckerman Treatment): The patient received Photochemotherapy: Tar and Broad Band UVB (Goeckerman treatment). Protocol For Puva: The patient received PUVA.

## 2024-01-27 NOTE — ED NOTES
Pt stable and off to Radiology via ED cart with [employer] tech. Pt states no concerns and vitals stable.

## 2024-01-29 LAB
EKG ATRIAL RATE: 87 BPM
EKG P AXIS: 24 DEGREES
EKG P-R INTERVAL: 150 MS
EKG Q-T INTERVAL: 376 MS
EKG QRS DURATION: 92 MS
EKG QTC CALCULATION (BAZETT): 452 MS
EKG R AXIS: -11 DEGREES
EKG T AXIS: 24 DEGREES
EKG VENTRICULAR RATE: 87 BPM

## 2024-01-31 ENCOUNTER — OFFICE VISIT (OUTPATIENT)
Dept: FAMILY MEDICINE CLINIC | Age: 49
End: 2024-01-31
Payer: COMMERCIAL

## 2024-01-31 VITALS
DIASTOLIC BLOOD PRESSURE: 84 MMHG | WEIGHT: 293 LBS | RESPIRATION RATE: 16 BRPM | HEART RATE: 78 BPM | OXYGEN SATURATION: 96 % | BODY MASS INDEX: 47.26 KG/M2 | TEMPERATURE: 97.5 F | SYSTOLIC BLOOD PRESSURE: 118 MMHG

## 2024-01-31 DIAGNOSIS — R55 SYNCOPE, UNSPECIFIED SYNCOPE TYPE: Primary | ICD-10-CM

## 2024-01-31 DIAGNOSIS — G43.701 CHRONIC MIGRAINE WITHOUT AURA WITH STATUS MIGRAINOSUS, NOT INTRACTABLE: ICD-10-CM

## 2024-01-31 DIAGNOSIS — Z98.84 HX OF GASTRIC BYPASS: ICD-10-CM

## 2024-01-31 DIAGNOSIS — R11.0 NAUSEA: ICD-10-CM

## 2024-01-31 LAB
BILIRUBIN, POC: NEGATIVE
BLOOD URINE, POC: NEGATIVE
CLARITY, POC: CLEAR
COLOR, POC: YELLOW
GLUCOSE URINE, POC: NEGATIVE
KETONES, POC: NEGATIVE
LEUKOCYTE EST, POC: NORMAL
NITRITE, POC: NEGATIVE
PH, POC: 7
PROTEIN, POC: NEGATIVE
SPECIFIC GRAVITY, POC: 1.01
UROBILINOGEN, POC: NORMAL

## 2024-01-31 PROCEDURE — 81002 URINALYSIS NONAUTO W/O SCOPE: CPT | Performed by: FAMILY MEDICINE

## 2024-01-31 PROCEDURE — 1036F TOBACCO NON-USER: CPT | Performed by: FAMILY MEDICINE

## 2024-01-31 PROCEDURE — 96372 THER/PROPH/DIAG INJ SC/IM: CPT | Performed by: FAMILY MEDICINE

## 2024-01-31 PROCEDURE — 99214 OFFICE O/P EST MOD 30 MIN: CPT | Performed by: FAMILY MEDICINE

## 2024-01-31 PROCEDURE — G8427 DOCREV CUR MEDS BY ELIG CLIN: HCPCS | Performed by: FAMILY MEDICINE

## 2024-01-31 PROCEDURE — 3074F SYST BP LT 130 MM HG: CPT | Performed by: FAMILY MEDICINE

## 2024-01-31 PROCEDURE — 3079F DIAST BP 80-89 MM HG: CPT | Performed by: FAMILY MEDICINE

## 2024-01-31 PROCEDURE — G8484 FLU IMMUNIZE NO ADMIN: HCPCS | Performed by: FAMILY MEDICINE

## 2024-01-31 PROCEDURE — G8417 CALC BMI ABV UP PARAM F/U: HCPCS | Performed by: FAMILY MEDICINE

## 2024-01-31 RX ORDER — ONDANSETRON 4 MG/1
4 TABLET, ORALLY DISINTEGRATING ORAL 3 TIMES DAILY PRN
Qty: 21 TABLET | Refills: 0 | Status: SHIPPED | OUTPATIENT
Start: 2024-01-31

## 2024-01-31 RX ORDER — CYANOCOBALAMIN 1000 UG/ML
1000 INJECTION, SOLUTION INTRAMUSCULAR; SUBCUTANEOUS ONCE
Status: COMPLETED | OUTPATIENT
Start: 2024-01-31 | End: 2024-01-31

## 2024-01-31 RX ADMIN — CYANOCOBALAMIN 1000 MCG: 1000 INJECTION, SOLUTION INTRAMUSCULAR; SUBCUTANEOUS at 11:57

## 2024-01-31 ASSESSMENT — ENCOUNTER SYMPTOMS
COUGH: 0
SORE THROAT: 0
CONSTIPATION: 0
ABDOMINAL PAIN: 0
SHORTNESS OF BREATH: 0
DIARRHEA: 0
WHEEZING: 0
NAUSEA: 1
RHINORRHEA: 0

## 2024-01-31 ASSESSMENT — PATIENT HEALTH QUESTIONNAIRE - PHQ9
SUM OF ALL RESPONSES TO PHQ QUESTIONS 1-9: 0
2. FEELING DOWN, DEPRESSED OR HOPELESS: 0
SUM OF ALL RESPONSES TO PHQ QUESTIONS 1-9: 0
SUM OF ALL RESPONSES TO PHQ QUESTIONS 1-9: 0
1. LITTLE INTEREST OR PLEASURE IN DOING THINGS: 0
SUM OF ALL RESPONSES TO PHQ QUESTIONS 1-9: 0
SUM OF ALL RESPONSES TO PHQ9 QUESTIONS 1 & 2: 0

## 2024-01-31 NOTE — PROGRESS NOTES
After obtaining consent, and per orders of Dr. Crystal Alvarado, the injection of Vitamin B12 1,000 MCG was given by Vidhi Suárez CMA. Patient instructed to remain in clinic for 20 minutes afterwards, and to report any adverse reaction to me immediately.

## 2024-01-31 NOTE — PROGRESS NOTES
[facility]  [address]   [facility] [address]  Dept: [phone]  Loc: [phone]     Kierra Yañez (:  1975) is a 48 y.o. female, here for evaluation of the following chief complaint(s):  ED Follow-up (Seen at [facility] on 2024. Patient states that she has passed out 3 times without reason since . Did have a Holter Monitor placed 2024. Patient states that still does not feel well. Cannot exactly put into words how she feels.)      ASSESSMENT/PLAN:  1. Syncope, unspecified syncope type  -     ondansetron (ZOFRAN-ODT) 4 MG disintegrating tablet; Take 1 tablet by mouth 3 times daily as needed for Nausea or Vomiting, Disp-21 tablet, R-0Normal  -     POCT Urinalysis no Micro  -     cyanocobalamin injection 1,000 mcg; 1,000 mcg, IntraMUSCular, ONCE, 1 dose, On 24 at 1200  2. Nausea  -     ondansetron (ZOFRAN-ODT) 4 MG disintegrating tablet; Take 1 tablet by mouth 3 times daily as needed for Nausea or Vomiting, Disp-21 tablet, R-0Normal  -     POCT Urinalysis no Micro  -     cyanocobalamin injection 1,000 mcg; 1,000 mcg, IntraMUSCular, ONCE, 1 dose, On 24 at 1200  3. Hx of gastric bypass  -     cyanocobalamin injection 1,000 mcg; 1,000 mcg, IntraMUSCular, ONCE, 1 dose, On 24 at 1200  4. Chronic migraine without aura with status migrainosus, not intractable  -     Rimegepant Sulfate 75 MG TBDP; Take 75 mg by mouth every other day, Disp-45 tablet, R-1Normal  Syncope likely multifactorial with poor oral intake that day, history of gastric bypass, BAC of 0.08%.  Follow-up with cardiology to review Holter monitor and rule out cardiac component.  Avoid fasting, continue with high-protein meals and increase fluids, limit EtOH  Zofran for nausea  B12 to help with fatigue and nutritional status  Add Nurtec every other day for migraine prevention    No follow-ups on

## 2024-02-06 LAB
ACQUISITION DURATION: NORMAL S
AVERAGE HEART RATE: 73 BPM
HOOKUP DATE: NORMAL
HOOKUP TIME: NORMAL
MAX HEART RATE TIME/DATE: NORMAL
MAX HEART RATE: 123 BPM
MIN HEART RATE TIME/DATE: NORMAL
MIN HEART RATE: 53 BPM
NUMBER OF QRS COMPLEXES: NORMAL
NUMBER OF SUPRAVENTRICULAR COUPLETS: 2
NUMBER OF SUPRAVENTRICULAR ECTOPICS: 193
NUMBER OF SUPRAVENTRICULAR ISOLATED BEATS: 168
NUMBER OF VENTRICULAR BIGEMINAL CYCLES: 0
NUMBER OF VENTRICULAR COUPLETS: 0
NUMBER OF VENTRICULAR ECTOPICS: 4

## 2024-02-06 NOTE — PATIENT INSTRUCTIONS
Your  Nurses  Today:  Cam    Your Provider for Today: Dr. Navarro            You may receive a survey regarding the care you received during your visit.  Your input is valuable to us.  We encourage you to complete and return your survey.  We hope you will choose us in the future for your healthcare needs.

## 2024-02-07 ENCOUNTER — OFFICE VISIT (OUTPATIENT)
Dept: CARDIOLOGY CLINIC | Age: 49
End: 2024-02-07
Payer: COMMERCIAL

## 2024-02-07 VITALS
SYSTOLIC BLOOD PRESSURE: 138 MMHG | WEIGHT: 293 LBS | HEIGHT: 69 IN | DIASTOLIC BLOOD PRESSURE: 92 MMHG | BODY MASS INDEX: 43.4 KG/M2 | HEART RATE: 82 BPM

## 2024-02-07 DIAGNOSIS — R55 SYNCOPE AND COLLAPSE: Primary | ICD-10-CM

## 2024-02-07 PROCEDURE — 99214 OFFICE O/P EST MOD 30 MIN: CPT | Performed by: INTERNAL MEDICINE

## 2024-02-07 PROCEDURE — 1036F TOBACCO NON-USER: CPT | Performed by: INTERNAL MEDICINE

## 2024-02-07 PROCEDURE — 3080F DIAST BP >= 90 MM HG: CPT | Performed by: INTERNAL MEDICINE

## 2024-02-07 PROCEDURE — 3075F SYST BP GE 130 - 139MM HG: CPT | Performed by: INTERNAL MEDICINE

## 2024-02-07 PROCEDURE — G8484 FLU IMMUNIZE NO ADMIN: HCPCS | Performed by: INTERNAL MEDICINE

## 2024-02-07 PROCEDURE — G8428 CUR MEDS NOT DOCUMENT: HCPCS | Performed by: INTERNAL MEDICINE

## 2024-02-07 PROCEDURE — G8417 CALC BMI ABV UP PARAM F/U: HCPCS | Performed by: INTERNAL MEDICINE

## 2024-02-07 NOTE — PROGRESS NOTES
Attempted to contact patient's , Galilea, , no answer, left voice message to call back.    
Pt here for check up     Pt c/o passed out recently ,wore holter monitor , does not feel like self since episode, heart palpitations,   
Diagnosis Orders   1. Syncope and collapse              Syncope with history of prior syncopes  BMI 47  HTN  Migraines  S/p gastric sleeve    Reviewed EKG  Reviewed holter monitor, no events  Discussed about loop recorder.  Will refer to EP for loop recorder implantation  Consider sleep study  CTA coronary showed essential normal coronaries  BP at home are in 120s  Continue current meds  The patient is asked to make an attempt to improve diet and exercise patterns to aid in medical management of this problem.  Advised more plant based nutrition/meditarrean diet   Advised patient to call office or seek immediate medical attention if there is any new onset of  any chest pain, sob, palpitations, lightheadedness, dizziness, orthopnea, PND or pedal edema.   All medication side effects were discussed in details.    Thank youfor allowing me to participate in the care of this patient.   Please do not hesitate to contact me for any further questions.     Return in about 1 year (around 2/7/2025), or if symptoms worsen or fail to improve, for Review testing, Regular follow up.       Electronically signed by Tad Navarro MD [facility]  2/7/2024 at 11:34 AM EDT

## 2024-02-12 ENCOUNTER — TELEPHONE (OUTPATIENT)
Dept: CARDIOLOGY CLINIC | Age: 49
End: 2024-02-12

## 2024-02-12 NOTE — PROGRESS NOTES
[address] [place] [address]  [address] [place] [address]  Dept: [phone]  Dept Fax: [phone]  Loc: [phone]    Sacha Weaver is a 41 y.o. female who presents today for her medical conditions/complaints as noted below. Chief Complaint   Patient presents with    Hypertension     Was at the [facility] and they got several high readings today.  Headache     Patient states she has had frequent headaches the last few weeks.  Other     Pap done at Dr. Shabbir Chapa office today 05/19/2020 will retrieve notes           HPI:     Pt presents for recheck of blood pressure. She states that over the past few weeks she has had a daily chronic mild headache. She states is in the front of her forehead. She thought that it was related to wearing a mask while at work. However, today she was at the [facility] for her Pap smear and was told that her blood pressure was extremely elevated. She had taken blood pressure medications in the past but has not been on one for quite some time. She states that she has had some weight gain over the past several months and she wonders if that has something to do with it. Blood pressure here in office is elevated again. She denies chest pain or shortness of breath. She does mention however that her OB said she had a big bruise in her inner thigh and patient denies any trauma. She states she has not noticed any other bruising but has had some bleeding with brushing her teeth.       Past Medical History:   Diagnosis Date    Headache(784.0)     Hypertension     Infertility     Nausea & vomiting 2010    with c birth    YIFAN on CPAP     Polycystic ovaries     PONV (postoperative nausea and vomiting)     PVC (premature ventricular contraction) 2014    seen by Dr. Blaire Narayan, echo done (normal) - decreased caffeine & PVC are less    Thyroid disease       Past Surgical History:   Procedure Laterality Date    APPENDECTOMY Care Management Note    Situation: Received referral for Ambulatory Care Management - [facility] candidacy list referral. Patient will be outreached for CM engagement. Key Assessments: epic risk score 85%    Actions Taken: Pre-call letter sent via [employer] [address] mail. Program plan: pending enrollment. (ZOFRAN) 8 MG tablet Take 8 mg by mouth every 8 hours as needed for Nausea or Vomiting       No current facility-administered medications for this visit. No Known Allergies    Health Maintenance   Topic Date Due    HIV screen  02/03/1991    DTaP/Tdap/Td vaccine (1 - Tdap) 02/03/1995    Cervical cancer screen  02/03/1997    Flu vaccine (Season Ended) 09/01/2020    Diabetes screen  10/05/2020    TSH testing  10/18/2020    Lipid screen  05/02/2024    Hepatitis A vaccine  Aged Out    Hepatitis B vaccine  Aged Out    Hib vaccine  Aged Out    Meningococcal (ACWY) vaccine  Aged Out    Pneumococcal 0-64 years Vaccine  Aged Out       Subjective:      Review of Systems   Constitutional: Positive for appetite change and unexpected weight change. Negative for activity change, chills, fatigue and fever. HENT: Negative for congestion, rhinorrhea and sore throat. Eyes: Negative for discharge and visual disturbance. Respiratory: Negative for cough, shortness of breath and wheezing. Cardiovascular: Negative for chest pain and palpitations. Gastrointestinal: Positive for nausea (with migraine). Negative for abdominal pain, constipation and diarrhea. Genitourinary: Negative for dysuria and hematuria. Musculoskeletal: Negative for arthralgias and myalgias. Neurological: Positive for headaches. Negative for dizziness. Hematological: Bruises/bleeds easily. Psychiatric/Behavioral: Negative for dysphoric mood and sleep disturbance. The patient is nervous/anxious. Objective:     Physical Exam  Vitals signs and nursing note reviewed. Constitutional:       Appearance: She is well-developed. HENT:      Head: Normocephalic and atraumatic. Eyes:      General: No scleral icterus. Right eye: No discharge. Left eye: No discharge. Conjunctiva/sclera: Conjunctivae normal.   Neck:      Musculoskeletal: Normal range of motion.    Cardiovascular:      Rate and Rhythm: Normal rate and regular rhythm. Heart sounds: Normal heart sounds. Pulmonary:      Effort: Pulmonary effort is normal.      Breath sounds: Normal breath sounds. No wheezing. Abdominal:      General: Bowel sounds are normal. There is no distension. Palpations: Abdomen is soft. Tenderness: There is no abdominal tenderness. Musculoskeletal:         General: No tenderness. Lymphadenopathy:      Cervical: No cervical adenopathy. Skin:     General: Skin is warm and dry. Neurological:      Mental Status: She is alert and oriented to person, place, and time. Coordination: Coordination normal.   Psychiatric:         Behavior: Behavior normal.         Thought Content: Thought content normal.         Judgment: Judgment normal.       BP (!) 146/102 (Site: Left Upper Arm, Position: Sitting, Cuff Size: Medium Adult)   Pulse 64   Temp 98.1 °F (36.7 °C) (Temporal)   Resp 18   SpO2 97%     Assessment:       Diagnosis Orders   1. Essential hypertension  hydroCHLOROthiazide (HYDRODIURIL) 25 MG tablet   2. Nausea  ondansetron (ZOFRAN ODT) 4 MG disintegrating tablet   3. Bruising  CBC Auto Differential       Plan: Will start hctz, pt to keep bp log. Follow up in 1-2 wks  zofran for nausea prn  Will check cbc due to bruising    Varsha Essence received counseling on the following healthy behaviors:nutrition    Patient given educational materials on Hypertension    I have instructed Varsha Lowry to complete a self tracking handout on Blood Pressures  and instructedthem to bring it with them to her next appointment. Discussed use, benefit, and side effects of prescribed medications. Barriers tomedication compliance addressed. All patient questions answered. Pt voiced understanding. Return in about 2 weeks (around 6/2/2020) for HTN.     Orders Placed This Encounter   Procedures    CBC Auto Differential     Standing Status:   Future     Standing Expiration Date:   5/19/2021     Orders Placed This Encounter   Medications   

## 2024-02-16 DIAGNOSIS — F43.21 GRIEF: ICD-10-CM

## 2024-02-16 RX ORDER — ESCITALOPRAM OXALATE 20 MG/1
TABLET ORAL
Qty: 90 TABLET | Refills: 3 | Status: SHIPPED | OUTPATIENT
Start: 2024-02-16

## 2024-02-16 NOTE — TELEPHONE ENCOUNTER
Last appointment this department: 1/31/2024  Next appointment this department: Visit date not found

## 2024-02-22 NOTE — TELEPHONE ENCOUNTER
3rd attempt- [facility]    [employer] message sent  Letter sent by mail.       Dr. Moose CADET I have not been able to contact the patient to schedule the loop implant.

## 2024-02-28 ENCOUNTER — OFFICE VISIT (OUTPATIENT)
Dept: FAMILY MEDICINE CLINIC | Age: 49
End: 2024-02-28
Payer: COMMERCIAL

## 2024-02-28 VITALS
TEMPERATURE: 98 F | SYSTOLIC BLOOD PRESSURE: 128 MMHG | HEART RATE: 89 BPM | DIASTOLIC BLOOD PRESSURE: 70 MMHG | WEIGHT: 293 LBS | OXYGEN SATURATION: 98 % | BODY MASS INDEX: 47.99 KG/M2 | RESPIRATION RATE: 16 BRPM

## 2024-02-28 DIAGNOSIS — J06.9 VIRAL URI: ICD-10-CM

## 2024-02-28 DIAGNOSIS — R09.81 SINUS CONGESTION: Primary | ICD-10-CM

## 2024-02-28 PROCEDURE — G8417 CALC BMI ABV UP PARAM F/U: HCPCS | Performed by: FAMILY MEDICINE

## 2024-02-28 PROCEDURE — 99213 OFFICE O/P EST LOW 20 MIN: CPT | Performed by: FAMILY MEDICINE

## 2024-02-28 PROCEDURE — 1036F TOBACCO NON-USER: CPT | Performed by: FAMILY MEDICINE

## 2024-02-28 PROCEDURE — G8484 FLU IMMUNIZE NO ADMIN: HCPCS | Performed by: FAMILY MEDICINE

## 2024-02-28 PROCEDURE — 3078F DIAST BP <80 MM HG: CPT | Performed by: FAMILY MEDICINE

## 2024-02-28 PROCEDURE — 96372 THER/PROPH/DIAG INJ SC/IM: CPT | Performed by: FAMILY MEDICINE

## 2024-02-28 PROCEDURE — 3074F SYST BP LT 130 MM HG: CPT | Performed by: FAMILY MEDICINE

## 2024-02-28 PROCEDURE — G8427 DOCREV CUR MEDS BY ELIG CLIN: HCPCS | Performed by: FAMILY MEDICINE

## 2024-02-28 RX ORDER — METHYLPREDNISOLONE ACETATE 80 MG/ML
80 INJECTION, SUSPENSION INTRA-ARTICULAR; INTRALESIONAL; INTRAMUSCULAR; SOFT TISSUE ONCE
Status: COMPLETED | OUTPATIENT
Start: 2024-02-28 | End: 2024-02-28

## 2024-02-28 RX ADMIN — METHYLPREDNISOLONE ACETATE 80 MG: 80 INJECTION, SUSPENSION INTRA-ARTICULAR; INTRALESIONAL; INTRAMUSCULAR; SOFT TISSUE at 14:06

## 2024-02-28 ASSESSMENT — ENCOUNTER SYMPTOMS
SINUS PAIN: 1
CONSTIPATION: 0
WHEEZING: 0
COUGH: 1
SWOLLEN GLANDS: 1
DIARRHEA: 0
HOARSE VOICE: 1
SHORTNESS OF BREATH: 0
SINUS PRESSURE: 1
ABDOMINAL PAIN: 0
SORE THROAT: 1
RHINORRHEA: 0
SINUS COMPLAINT: 1
NAUSEA: 0

## 2024-02-28 NOTE — PROGRESS NOTES
[facility]  [address]  [facility] [address]  Dept: [phone]  Loc: [phone]     Kierra Yañez (:  1975) is a 49 y.o. female, here for evaluation of the following chief complaint(s):  Fever (Woke up Monday with a fever. Fever broke . C/O body aches, sore throat, and sinus pressure since Monday. )      ASSESSMENT/PLAN:  1. Sinus congestion  -     methylPREDNISolone acetate (DEPO-MEDROL) injection 80 mg; 80 mg, IntraMUSCular, ONCE, 1 dose, On 24 at 1430  2. Viral URI  -     methylPREDNISolone acetate (DEPO-MEDROL) injection 80 mg; 80 mg, IntraMUSCular, ONCE, 1 dose, On 24 at 1430    Will give depo medrol for symptom control  Follow up if symptoms worsen or persist  No follow-ups on file.    SUBJECTIVE/OBJECTIVE:  Sinus Problem  This is a new problem. The current episode started in the past 7 days. The problem has been gradually worsening since onset. The maximum temperature recorded prior to her arrival was 101 - 101.9 F. The fever has been present for Less than 1 day. The pain is moderate. Associated symptoms include chills, congestion, coughing, headaches, a hoarse voice, sinus pressure, a sore throat and swollen glands. Pertinent negatives include no diaphoresis or shortness of breath. Past treatments include nothing. The treatment provided no relief.       Review of Systems   Constitutional:  Positive for activity change, chills, fatigue and fever. Negative for diaphoresis.   HENT:  Positive for congestion, hoarse voice, postnasal drip, sinus pressure, sinus pain and sore throat. Negative for rhinorrhea.    Respiratory:  Positive for cough. Negative for shortness of breath and wheezing.    Cardiovascular:  Negative for chest pain and palpitations.   Gastrointestinal:  Negative for abdominal pain, constipation, diarrhea and nausea.   Genitourinary:  Negative for dysuria and hematuria.

## 2024-03-21 DIAGNOSIS — I10 ESSENTIAL HYPERTENSION: ICD-10-CM

## 2024-03-21 RX ORDER — LISINOPRIL AND HYDROCHLOROTHIAZIDE 25; 20 MG/1; MG/1
TABLET ORAL
Qty: 90 TABLET | Refills: 3 | Status: SHIPPED | OUTPATIENT
Start: 2024-03-21

## 2024-04-21 RX ORDER — PANTOPRAZOLE SODIUM 40 MG/1
40 TABLET, DELAYED RELEASE ORAL
Qty: 30 TABLET | Refills: 3 | Status: SHIPPED | OUTPATIENT
Start: 2024-04-21

## 2024-05-01 ENCOUNTER — PATIENT MESSAGE (OUTPATIENT)
Dept: FAMILY MEDICINE CLINIC | Age: 49
End: 2024-05-01

## 2024-05-01 DIAGNOSIS — R11.0 NAUSEA: ICD-10-CM

## 2024-05-01 DIAGNOSIS — R55 SYNCOPE, UNSPECIFIED SYNCOPE TYPE: ICD-10-CM

## 2024-05-01 DIAGNOSIS — F41.1 GENERALIZED ANXIETY DISORDER: ICD-10-CM

## 2024-05-01 NOTE — TELEPHONE ENCOUNTER
This medication refill is regarding a MyChart request. Refill requested by patient.    Requested Prescriptions     Pending Prescriptions Disp Refills    LORazepam (ATIVAN) 0.5 MG tablet 60 tablet 2     Sig: Take 1 tablet by mouth 2 times daily as needed for Anxiety for up to 90 days.       Date of last visit: 2/28/2024   Date of next visit: None  Date of last refill: 1/24/2024 #60/2  [facility]

## 2024-05-01 NOTE — TELEPHONE ENCOUNTER
From: Kierra Yañez  To: Dr. Crystal Alvarado  Sent: 5/1/2024 12:32 PM EDT  Subject: Medications    Hello Dr Alvarado,  I am trying to put in a request for the PRN Ativan and do not see it on my list. Are you able to send in a refill with the zofran that I just requested?   Also, is there a different medication I can can try other than the lexapro and buspar, or add something on to help with me being on edge, and mad? If I need an appointment, I can come anytime after 3 the next couple weeks.   Any and all advice is welcome.  Thanks for your time,  Kierra Yañez

## 2024-05-01 NOTE — TELEPHONE ENCOUNTER
This medication refill is regarding a MyChart request. Refill requested by patient.    Requested Prescriptions     Pending Prescriptions Disp Refills    ondansetron (ZOFRAN-ODT) 4 MG disintegrating tablet 21 tablet 0     Sig: Take 1 tablet by mouth 3 times daily as needed for Nausea or Vomiting       Date of last visit: 2/28/2024   Date of next visit: None  Date of last refill: 1/31/2024 #21/0  Pharmacy Name: [facility]

## 2024-05-02 RX ORDER — ONDANSETRON 4 MG/1
4 TABLET, ORALLY DISINTEGRATING ORAL 3 TIMES DAILY PRN
Qty: 21 TABLET | Refills: 0 | Status: SHIPPED | OUTPATIENT
Start: 2024-05-02

## 2024-05-02 RX ORDER — LORAZEPAM 0.5 MG/1
0.5 TABLET ORAL 2 TIMES DAILY PRN
Qty: 60 TABLET | Refills: 2 | Status: SHIPPED | OUTPATIENT
Start: 2024-05-02 | End: 2024-07-31

## 2024-05-13 ENCOUNTER — OFFICE VISIT (OUTPATIENT)
Dept: FAMILY MEDICINE CLINIC | Age: 49
End: 2024-05-13
Payer: COMMERCIAL

## 2024-05-13 VITALS
HEIGHT: 69 IN | BODY MASS INDEX: 43.4 KG/M2 | RESPIRATION RATE: 18 BRPM | OXYGEN SATURATION: 96 % | WEIGHT: 293 LBS | DIASTOLIC BLOOD PRESSURE: 62 MMHG | SYSTOLIC BLOOD PRESSURE: 124 MMHG | HEART RATE: 74 BPM

## 2024-05-13 DIAGNOSIS — F41.1 GENERALIZED ANXIETY DISORDER: Primary | ICD-10-CM

## 2024-05-13 DIAGNOSIS — I10 ESSENTIAL HYPERTENSION: ICD-10-CM

## 2024-05-13 DIAGNOSIS — E66.01 MORBID OBESITY WITH BMI OF 50.0-59.9, ADULT (HCC): ICD-10-CM

## 2024-05-13 PROCEDURE — 3078F DIAST BP <80 MM HG: CPT | Performed by: FAMILY MEDICINE

## 2024-05-13 PROCEDURE — 1036F TOBACCO NON-USER: CPT | Performed by: FAMILY MEDICINE

## 2024-05-13 PROCEDURE — 3074F SYST BP LT 130 MM HG: CPT | Performed by: FAMILY MEDICINE

## 2024-05-13 PROCEDURE — G8417 CALC BMI ABV UP PARAM F/U: HCPCS | Performed by: FAMILY MEDICINE

## 2024-05-13 PROCEDURE — 99214 OFFICE O/P EST MOD 30 MIN: CPT | Performed by: FAMILY MEDICINE

## 2024-05-13 PROCEDURE — G8427 DOCREV CUR MEDS BY ELIG CLIN: HCPCS | Performed by: FAMILY MEDICINE

## 2024-05-13 RX ORDER — BUSPIRONE HYDROCHLORIDE 15 MG/1
15 TABLET ORAL 2 TIMES DAILY
Qty: 180 TABLET | Refills: 1 | Status: SHIPPED | OUTPATIENT
Start: 2024-05-13

## 2024-05-13 SDOH — ECONOMIC STABILITY: FOOD INSECURITY: WITHIN THE PAST 12 MONTHS, YOU WORRIED THAT YOUR FOOD WOULD RUN OUT BEFORE YOU GOT MONEY TO BUY MORE.: NEVER TRUE

## 2024-05-13 SDOH — ECONOMIC STABILITY: FOOD INSECURITY: WITHIN THE PAST 12 MONTHS, THE FOOD YOU BOUGHT JUST DIDN'T LAST AND YOU DIDN'T HAVE MONEY TO GET MORE.: NEVER TRUE

## 2024-05-13 SDOH — ECONOMIC STABILITY: INCOME INSECURITY: HOW HARD IS IT FOR YOU TO PAY FOR THE VERY BASICS LIKE FOOD, HOUSING, MEDICAL CARE, AND HEATING?: NOT HARD AT ALL

## 2024-05-13 ASSESSMENT — ENCOUNTER SYMPTOMS
WHEEZING: 0
COUGH: 0
CONSTIPATION: 0
SHORTNESS OF BREATH: 0
SORE THROAT: 0
NAUSEA: 0
DIARRHEA: 0
ABDOMINAL PAIN: 0
RHINORRHEA: 0

## 2024-05-13 NOTE — PROGRESS NOTES
[facility]  [address]   [facility] [address]  Dept: [phone]  Loc: [phone]     Kierra Yañez (:  1975) is a 49 y.o. female, here for evaluation of the following chief complaint(s):  Discuss Medications (Irritable, yelling at kids more, feels meds need increased or changed )      ASSESSMENT/PLAN:  1. Generalized anxiety disorder  -     busPIRone (BUSPAR) 15 MG tablet; Take 15 mg by mouth in the morning and at bedtime, Disp-180 tablet, R-1Normal  2. Essential hypertension  3. Morbid obesity with BMI of 50.0-59.9, adult (HCC)  Increase buspar to 15 mg bid  Bp controlled  Recommend weight loss clinic to consider compounded semaglutide for weight loss along with diet/exercise    Return in about 6 months (around 2024).    SUBJECTIVE/OBJECTIVE:  HPI  Patient presents with complaints of increased anxiety and irritability.  She states that lately she is just not been feeling as well.  Unfortunately, patient had great success when she was on Mounjaro and had significant weight loss however she is now unable to get the med due to cost.  She states since her weight gain she has felt lower energy and more anxious and irritable.  Her blood pressure is controlled.  She has had more migraines but she is awaiting her Nurtec prescription.  She is sleeping well and appetite is \"too good.\"  She denies any thoughts of hurting herself.  Not really feeling depressed or sad but more irritable.  Review of Systems   Constitutional:  Positive for activity change, appetite change and unexpected weight change. Negative for chills, fatigue and fever.   HENT:  Negative for congestion, rhinorrhea and sore throat.    Respiratory:  Negative for cough, shortness of breath and wheezing.    Cardiovascular:  Negative for chest pain and palpitations.   Gastrointestinal:  Negative for abdominal pain, constipation, diarrhea and nausea.   Genitourinary:

## 2024-05-14 ENCOUNTER — PATIENT MESSAGE (OUTPATIENT)
Dept: FAMILY MEDICINE CLINIC | Age: 49
End: 2024-05-14

## 2024-05-14 DIAGNOSIS — E66.01 MORBID OBESITY DUE TO EXCESS CALORIES (HCC): Primary | ICD-10-CM

## 2024-05-14 NOTE — TELEPHONE ENCOUNTER
From: Kierra Yañez  To: Dr. Crystal Alvarado  Sent: 5/14/2024 9:50 AM EDT  Subject: Labs    Good Morning Dr Alvarado,  I reached out to Dr Robison office regarding the weight loss medications. They have an opening on Friday. However, they would like a fasting blood sugar, kidney function and Hgb A1C if possible. I had a BMP in January when I was in the ER but it was not fasting. Are you willing to place those orders so I can fast and complete the blood work tomorrow morning?  Thanks!  Kierra

## 2024-05-15 ENCOUNTER — HOSPITAL ENCOUNTER (OUTPATIENT)
Age: 49
Discharge: HOME | End: 2024-05-15
Payer: COMMERCIAL

## 2024-05-15 DIAGNOSIS — E66.01 MORBID OBESITY DUE TO EXCESS CALORIES (HCC): ICD-10-CM

## 2024-05-15 LAB
ANION GAP SERPL CALC-SCNC: 6 MEQ/L (ref 8–16)
BUN SERPL-MCNC: 12 MG/DL (ref 7–22)
CALCIUM SERPL-MCNC: 8.9 MG/DL (ref 8.5–10.5)
CHLORIDE SERPL-SCNC: 102 MEQ/L (ref 98–111)
CO2 SERPL-SCNC: 31 MEQ/L (ref 23–33)
CREAT SERPL-MCNC: 0.5 MG/DL (ref 0.4–1.2)
DEPRECATED MEAN GLUCOSE BLD GHB EST-ACNC: 96 MG/DL (ref 70–126)
GFR SERPL CREATININE-BSD FRML MDRD: > 90 ML/MIN/1.73M2
GLUCOSE SERPL-MCNC: 77 MG/DL (ref 70–108)
HBA1C MFR BLD HPLC: 5.2 % (ref 4.4–6.4)
POTASSIUM SERPL-SCNC: 4.1 MEQ/L (ref 3.5–5.2)
SODIUM SERPL-SCNC: 139 MEQ/L (ref 135–145)

## 2024-05-15 PROCEDURE — 80048 BASIC METABOLIC PNL TOTAL CA: CPT

## 2024-05-15 PROCEDURE — 83036 HEMOGLOBIN GLYCOSYLATED A1C: CPT

## 2024-05-15 PROCEDURE — 36415 COLL VENOUS BLD VENIPUNCTURE: CPT

## 2024-08-09 ENCOUNTER — PATIENT MESSAGE (OUTPATIENT)
Dept: FAMILY MEDICINE CLINIC | Age: 49
End: 2024-08-09

## 2024-08-09 DIAGNOSIS — E03.9 ACQUIRED HYPOTHYROIDISM: Primary | ICD-10-CM

## 2024-08-09 NOTE — TELEPHONE ENCOUNTER
From: Kierra Yañez  To: Dr. Crystal Alvarado  Sent: 8/9/2024 2:20 PM EDT  Subject: Medication refill    Good afternoon Dr Alvarado  I see Austyn Gómez for my thyroid. He retired last Wednesday, his office is sending a referral to the [facility]. However they do not know when they can get me in since they have not reviewed my chart. My script for levothyroxine expires in October, which is when I was to follow up with Dr Gómez. They can not extent my prescription due to him leaving last week. Is there a way you can extend my script until March?? If so, it is the [employer] brand name, levothyroxine 225mcg daily. I use [facility] in [place].  Please let me know if this is possible, I do not want to run out of meds.  I appreciate your assistance with this matter.  Kierra Yañez

## 2024-08-12 RX ORDER — LEVOTHYROXINE SODIUM 0.07 MG/1
300 TABLET ORAL DAILY
Qty: 360 TABLET | Refills: 3 | Status: SHIPPED | OUTPATIENT
Start: 2024-08-12 | End: 2025-08-07

## 2024-08-12 NOTE — TELEPHONE ENCOUNTER
Called [facility] and spoke with Angelo, he stated patient has been taking 75 mch 4 tablets daily for a long time

## 2024-08-12 NOTE — TELEPHONE ENCOUNTER
This would be 300 mcg daily which is a lot.  Can we call pharmacy to confirm dosage?  Once confirmed, I am happy to send in, I just want to make sure she gets correct dose. thanks

## 2024-08-14 ENCOUNTER — PATIENT MESSAGE (OUTPATIENT)
Dept: FAMILY MEDICINE CLINIC | Age: 49
End: 2024-08-14

## 2024-08-14 DIAGNOSIS — F41.1 GENERALIZED ANXIETY DISORDER: ICD-10-CM

## 2024-08-15 RX ORDER — LORAZEPAM 0.5 MG/1
0.5 TABLET ORAL 2 TIMES DAILY PRN
Qty: 60 TABLET | Refills: 2 | Status: SHIPPED | OUTPATIENT
Start: 2024-08-15 | End: 2024-11-13

## 2024-09-10 RX ORDER — PANTOPRAZOLE SODIUM 40 MG/1
40 TABLET, DELAYED RELEASE ORAL
Qty: 60 TABLET | Refills: 0 | Status: SHIPPED | OUTPATIENT
Start: 2024-09-10

## 2024-09-26 ENCOUNTER — OFFICE VISIT (OUTPATIENT)
Dept: FAMILY MEDICINE CLINIC | Age: 49
End: 2024-09-26
Payer: COMMERCIAL

## 2024-09-26 VITALS
HEIGHT: 69 IN | BODY MASS INDEX: 43.4 KG/M2 | DIASTOLIC BLOOD PRESSURE: 84 MMHG | HEART RATE: 90 BPM | SYSTOLIC BLOOD PRESSURE: 130 MMHG | RESPIRATION RATE: 20 BRPM | TEMPERATURE: 98.7 F | WEIGHT: 293 LBS

## 2024-09-26 DIAGNOSIS — G43.909 ACUTE MIGRAINE: Primary | ICD-10-CM

## 2024-09-26 PROCEDURE — 99213 OFFICE O/P EST LOW 20 MIN: CPT | Performed by: FAMILY MEDICINE

## 2024-09-26 PROCEDURE — G8427 DOCREV CUR MEDS BY ELIG CLIN: HCPCS | Performed by: FAMILY MEDICINE

## 2024-09-26 PROCEDURE — 3079F DIAST BP 80-89 MM HG: CPT | Performed by: FAMILY MEDICINE

## 2024-09-26 PROCEDURE — 96372 THER/PROPH/DIAG INJ SC/IM: CPT | Performed by: FAMILY MEDICINE

## 2024-09-26 PROCEDURE — 3075F SYST BP GE 130 - 139MM HG: CPT | Performed by: FAMILY MEDICINE

## 2024-09-26 PROCEDURE — 1036F TOBACCO NON-USER: CPT | Performed by: FAMILY MEDICINE

## 2024-09-26 PROCEDURE — G8417 CALC BMI ABV UP PARAM F/U: HCPCS | Performed by: FAMILY MEDICINE

## 2024-09-26 RX ORDER — KETOROLAC TROMETHAMINE 30 MG/ML
60 INJECTION, SOLUTION INTRAMUSCULAR; INTRAVENOUS ONCE
Status: COMPLETED | OUTPATIENT
Start: 2024-09-26 | End: 2024-09-26

## 2024-09-26 RX ADMIN — KETOROLAC TROMETHAMINE 60 MG: 30 INJECTION, SOLUTION INTRAMUSCULAR; INTRAVENOUS at 09:27

## 2024-09-26 ASSESSMENT — ENCOUNTER SYMPTOMS
DIARRHEA: 0
PHOTOPHOBIA: 1
SHORTNESS OF BREATH: 0
SCALP TENDERNESS: 0
WHEEZING: 0
BLURRED VISION: 1
VOMITING: 1
RHINORRHEA: 0
CONSTIPATION: 0
ABDOMINAL PAIN: 1
NAUSEA: 1
COUGH: 0
SORE THROAT: 0

## 2024-10-14 DIAGNOSIS — G43.909 MIGRAINE WITHOUT STATUS MIGRAINOSUS, NOT INTRACTABLE, UNSPECIFIED MIGRAINE TYPE: ICD-10-CM

## 2024-10-15 DIAGNOSIS — F41.1 GENERALIZED ANXIETY DISORDER: ICD-10-CM

## 2024-10-15 RX ORDER — BUSPIRONE HYDROCHLORIDE 15 MG/1
15 TABLET ORAL 2 TIMES DAILY
Qty: 180 TABLET | Refills: 0 | Status: SHIPPED | OUTPATIENT
Start: 2024-10-15

## 2024-10-15 RX ORDER — SUMATRIPTAN 100 MG/1
TABLET, FILM COATED ORAL
Qty: 18 TABLET | Refills: 5 | Status: SHIPPED | OUTPATIENT
Start: 2024-10-15

## 2024-10-15 NOTE — TELEPHONE ENCOUNTER
This medication refill is regarding a electronic request.  Refill requested by  the pharmacy .    Requested Prescriptions     Pending Prescriptions Disp Refills    busPIRone (BUSPAR) 15 MG tablet [Pharmacy Med Name: busPIRone HCl 15 MG Oral Tablet] 180 tablet 0     Sig: Take 1 tablet by mouth twice daily       Date of last visit: 9/26/2024  Date of next visit: 11/20/2024  Date of last refill: 05/13/024  Pharmacy Name: [facility] in [place]    Last Lipid Panel:    Lab Results   Component Value Date/Time    CHOL 139 08/31/2023 08:32 AM    TRIG 50 08/31/2023 08:32 AM    HDL 50 08/31/2023 08:32 AM     Last CMP:   Lab Results   Component Value Date     05/15/2024    K 4.1 05/15/2024     05/15/2024    CO2 31 05/15/2024    BUN 12 05/15/2024    CREATININE 0.5 05/15/2024    GLUCOSE 77 05/15/2024    CALCIUM 8.9 05/15/2024    BILITOT 0.2 01/26/2024    ALKPHOS 74 01/26/2024    AST 18 01/26/2024    ALT 23 01/26/2024    LABGLOM > 90 05/15/2024       Last Thyroid:    Lab Results   Component Value Date    TSH 0.425 10/30/2023    T4FREE 1.08 08/23/2021     Last Hemoglobin A1C:    Lab Results   Component Value Date/Time    LABA1C 5.2 05/15/2024 06:00 AM       Rx verified, ordered and set to EP.

## 2024-11-15 DIAGNOSIS — F41.1 GENERALIZED ANXIETY DISORDER: ICD-10-CM

## 2024-11-15 DIAGNOSIS — F43.21 GRIEF: ICD-10-CM

## 2024-11-15 RX ORDER — ESCITALOPRAM OXALATE 20 MG/1
TABLET ORAL
Qty: 180 TABLET | Refills: 0 | Status: SHIPPED | OUTPATIENT
Start: 2024-11-15

## 2024-11-15 RX ORDER — LORAZEPAM 0.5 MG/1
0.5 TABLET ORAL 2 TIMES DAILY PRN
Qty: 60 TABLET | Refills: 0 | Status: SHIPPED | OUTPATIENT
Start: 2024-11-15 | End: 2024-12-15

## 2024-11-15 RX ORDER — PANTOPRAZOLE SODIUM 40 MG/1
40 TABLET, DELAYED RELEASE ORAL
Qty: 60 TABLET | Refills: 0 | Status: SHIPPED | OUTPATIENT
Start: 2024-11-15

## 2024-11-15 NOTE — TELEPHONE ENCOUNTER
This medication refill is regarding a electronic request.  Refill requested by patient.    Requested Prescriptions     Pending Prescriptions Disp Refills    escitalopram (LEXAPRO) 20 MG tablet [Pharmacy Med Name: Escitalopram Oxalate 20 MG Oral Tablet] 180 tablet 0     Sig: Take 1 tablet by mouth once daily    LORazepam (ATIVAN) 0.5 MG tablet [Pharmacy Med Name: LORazepam 0.5 MG Oral Tablet] 60 tablet 0     Sig: Take 1 tablet by mouth 2 times daily as needed.    pantoprazole (PROTONIX) 40 MG tablet [Pharmacy Med Name: Pantoprazole Sodium 40 MG Oral Tablet Delayed Release] 60 tablet 0     Sig: TAKE 1 TABLET BY MOUTH IN THE MORNING BEFORE BREAKFAST       Date of last visit: 9/26/2024  Date of next visit: 11/20/2024  Date of last refill: 02/16/2024  Pharmacy Name: [facility]    Last Lipid Panel:    Lab Results   Component Value Date/Time    CHOL 139 08/31/2023 08:32 AM    TRIG 50 08/31/2023 08:32 AM    HDL 50 08/31/2023 08:32 AM     Last CMP:   Lab Results   Component Value Date     05/15/2024    K 4.1 05/15/2024     05/15/2024    CO2 31 05/15/2024    BUN 12 05/15/2024    CREATININE 0.5 05/15/2024    GLUCOSE 77 05/15/2024    CALCIUM 8.9 05/15/2024    BILITOT 0.2 01/26/2024    ALKPHOS 74 01/26/2024    AST 18 01/26/2024    ALT 23 01/26/2024    LABGLOM > 90 05/15/2024       Last Thyroid:    Lab Results   Component Value Date    TSH 0.425 10/30/2023    T4FREE 1.08 08/23/2021     Last Hemoglobin A1C:    Lab Results   Component Value Date/Time    LABA1C 5.2 05/15/2024 06:00 AM       Rx verified, ordered and set to EP.

## 2024-11-27 ENCOUNTER — OFFICE VISIT (OUTPATIENT)
Dept: FAMILY MEDICINE CLINIC | Age: 49
End: 2024-11-27
Payer: COMMERCIAL

## 2024-11-27 VITALS
RESPIRATION RATE: 20 BRPM | DIASTOLIC BLOOD PRESSURE: 82 MMHG | WEIGHT: 293 LBS | BODY MASS INDEX: 43.4 KG/M2 | HEART RATE: 84 BPM | TEMPERATURE: 98.6 F | SYSTOLIC BLOOD PRESSURE: 124 MMHG | HEIGHT: 69 IN | OXYGEN SATURATION: 99 %

## 2024-11-27 DIAGNOSIS — F41.1 GENERALIZED ANXIETY DISORDER: Primary | ICD-10-CM

## 2024-11-27 DIAGNOSIS — F43.21 GRIEF: ICD-10-CM

## 2024-11-27 DIAGNOSIS — K21.9 GASTROESOPHAGEAL REFLUX DISEASE WITHOUT ESOPHAGITIS: ICD-10-CM

## 2024-11-27 DIAGNOSIS — I10 ESSENTIAL HYPERTENSION: ICD-10-CM

## 2024-11-27 DIAGNOSIS — B36.9 FUNGAL DERMATITIS: ICD-10-CM

## 2024-11-27 DIAGNOSIS — G43.909 MIGRAINE WITHOUT STATUS MIGRAINOSUS, NOT INTRACTABLE, UNSPECIFIED MIGRAINE TYPE: ICD-10-CM

## 2024-11-27 PROCEDURE — 1036F TOBACCO NON-USER: CPT | Performed by: NURSE PRACTITIONER

## 2024-11-27 PROCEDURE — 3074F SYST BP LT 130 MM HG: CPT | Performed by: NURSE PRACTITIONER

## 2024-11-27 PROCEDURE — G8417 CALC BMI ABV UP PARAM F/U: HCPCS | Performed by: NURSE PRACTITIONER

## 2024-11-27 PROCEDURE — 99214 OFFICE O/P EST MOD 30 MIN: CPT | Performed by: NURSE PRACTITIONER

## 2024-11-27 PROCEDURE — G8484 FLU IMMUNIZE NO ADMIN: HCPCS | Performed by: NURSE PRACTITIONER

## 2024-11-27 PROCEDURE — 3079F DIAST BP 80-89 MM HG: CPT | Performed by: NURSE PRACTITIONER

## 2024-11-27 PROCEDURE — G8427 DOCREV CUR MEDS BY ELIG CLIN: HCPCS | Performed by: NURSE PRACTITIONER

## 2024-11-27 RX ORDER — FLUCONAZOLE 150 MG/1
150 TABLET ORAL
Qty: 2 TABLET | Refills: 0 | Status: SHIPPED | OUTPATIENT
Start: 2024-11-27 | End: 2024-12-03

## 2024-11-27 RX ORDER — NYSTATIN 100000 [USP'U]/G
POWDER TOPICAL
COMMUNITY
Start: 2024-09-13

## 2024-11-27 RX ORDER — SUMATRIPTAN SUCCINATE 100 MG/1
TABLET ORAL
Qty: 18 TABLET | Refills: 5 | Status: SHIPPED | OUTPATIENT
Start: 2024-11-27

## 2024-11-27 RX ORDER — PANTOPRAZOLE SODIUM 40 MG/1
40 TABLET, DELAYED RELEASE ORAL
Qty: 90 TABLET | Refills: 1 | Status: SHIPPED | OUTPATIENT
Start: 2024-11-27 | End: 2025-02-25

## 2024-11-27 NOTE — PROGRESS NOTES
Kierra Yañez (:  1975) is a 49 y.o. female,Established patient, here for evaluation of the following chief complaint(s):  Anxiety (Stable. Buspar increase helping some with anxiety. ) and Rash (Possible yeast infection lower, left abdomen x 4 days. Using Desitin and Nystatin powder. Issue since last child, . Comes and goes.)         Assessment & Plan  Generalized anxiety disorder   {A/P Summary:0010957855}         Grief   {A/P Summary:4487245169}         Essential hypertension   {A/P Summary:7289605067}         Gastroesophageal reflux disease without esophagitis   {A/P Summary:6488157087}         Fungal dermatitis   {A/P Summary:8876252024}           No follow-ups on file.       Subjective   Anxiety  Patient reports no chest pain, dizziness, nausea, nervous/anxious behavior, palpitations or shortness of breath.       Rash  Pertinent negatives include no congestion, cough, diarrhea, fatigue, fever, shortness of breath, sore throat or vomiting.       Review of Systems   Constitutional:  Negative for activity change, appetite change, chills, diaphoresis, fatigue, fever and unexpected weight change.   HENT:  Negative for congestion, ear pain, postnasal drip, sinus pressure and sore throat.    Eyes:  Negative for visual disturbance.   Respiratory:  Negative for cough, chest tightness, shortness of breath, wheezing and stridor.    Cardiovascular:  Negative for chest pain, palpitations and leg swelling.   Gastrointestinal:  Negative for abdominal distention, abdominal pain, constipation, diarrhea, nausea and vomiting.   Endocrine: Negative for polydipsia, polyphagia and polyuria.   Genitourinary:  Negative for decreased urine volume, difficulty urinating, dysuria, flank pain, frequency, hematuria and urgency.   Musculoskeletal:  Negative for arthralgias, back pain, gait problem, joint swelling, myalgias and neck pain.   Skin:  Positive for rash and wound. Negative for color change and pallor.   Neurological: 
slight improvement in her condition. She continues to take Lexapro and Ativan as needed, usually at night to aid sleep. She is requesting refills for all her medications.    She mentions a history of migraines, which have been managed with Nurtec every other day and Imitrex as needed.    She has been experiencing a yeast infection in her skin folds, which has spread to the lower part of her stomach. She has been using Desitin and nystatin powder for about a week, which has provided some relief. However, she is concerned about the infection spreading upwards on her stomach. She experiences pain when drying off after a shower and has been using an old T-shirt to alleviate the discomfort. She reports no itching but mentions irritation.    She has been experiencing numbness and tingling in her fingers and plans to discuss this with her new thyroid doctor in [place], as her previous doctor, Dr. Gómez, has retired.    She takes Protonix daily for acid reflux, which she reports is effective. She is requesting a refill of this medication. She continues to take lisinopril and hydrochlorothiazide.    She has been working extensively and is considering taking a vacation. She has an annual appointment with her cardiologist, Dr. Keen, on 01/22/2025. She has experienced fainting episodes, which were investigated with a tilt table test, sleep studies, event monitors, and MRIs of the brain and heart, all of which were normal. She describes feeling hot and burning sensations that spread to her ears before she faints.    Review of Systems   Constitutional:  Negative for activity change, appetite change, chills, diaphoresis, fatigue, fever and unexpected weight change.   HENT:  Negative for congestion, ear pain, postnasal drip, sinus pressure and sore throat.    Eyes:  Negative for visual disturbance.   Respiratory:  Negative for cough, chest tightness, shortness of breath, wheezing and stridor.    Cardiovascular:  Negative for

## 2024-11-27 NOTE — TELEPHONE ENCOUNTER
Last appointment this department: 11/27/2024  Next appointment this department: Visit date not found

## 2024-12-05 ENCOUNTER — HOSPITAL ENCOUNTER (OUTPATIENT)
Age: 49
Discharge: HOME | End: 2024-12-05
Payer: COMMERCIAL

## 2024-12-05 LAB
T4 FREE SERPL-MCNC: 1.33 NG/DL (ref 0.93–1.68)
TSH SERPL DL<=0.005 MIU/L-ACNC: 0.69 UIU/ML (ref 0.4–4.2)

## 2024-12-05 PROCEDURE — 84443 ASSAY THYROID STIM HORMONE: CPT

## 2024-12-05 PROCEDURE — 36415 COLL VENOUS BLD VENIPUNCTURE: CPT

## 2024-12-05 PROCEDURE — 84439 ASSAY OF FREE THYROXINE: CPT

## 2024-12-30 ENCOUNTER — HOSPITAL ENCOUNTER (OUTPATIENT)
Dept: MAMMOGRAPHY | Age: 49
Discharge: HOME | End: 2024-12-10

## 2024-12-30 DIAGNOSIS — Z13.9 SCREENING DUE: ICD-10-CM

## 2025-01-14 ENCOUNTER — OFFICE VISIT (OUTPATIENT)
Dept: FAMILY MEDICINE CLINIC | Age: 50
End: 2025-01-14
Payer: COMMERCIAL

## 2025-01-14 VITALS
HEIGHT: 69 IN | DIASTOLIC BLOOD PRESSURE: 72 MMHG | HEART RATE: 91 BPM | BODY MASS INDEX: 43.4 KG/M2 | TEMPERATURE: 101.7 F | SYSTOLIC BLOOD PRESSURE: 128 MMHG | RESPIRATION RATE: 18 BRPM | OXYGEN SATURATION: 95 % | WEIGHT: 293 LBS

## 2025-01-14 DIAGNOSIS — R50.9 FEVER, UNSPECIFIED FEVER CAUSE: ICD-10-CM

## 2025-01-14 DIAGNOSIS — R06.2 WHEEZING: ICD-10-CM

## 2025-01-14 DIAGNOSIS — R52 BODY ACHES: ICD-10-CM

## 2025-01-14 DIAGNOSIS — J10.1 INFLUENZA A: Primary | ICD-10-CM

## 2025-01-14 DIAGNOSIS — J02.9 SORE THROAT: ICD-10-CM

## 2025-01-14 LAB
INFLUENZA VIRUS A RNA: POSITIVE
INFLUENZA VIRUS B RNA: NEGATIVE
STREPTOCOCCUS A RNA: NEGATIVE

## 2025-01-14 PROCEDURE — G8417 CALC BMI ABV UP PARAM F/U: HCPCS | Performed by: NURSE PRACTITIONER

## 2025-01-14 PROCEDURE — 3078F DIAST BP <80 MM HG: CPT | Performed by: NURSE PRACTITIONER

## 2025-01-14 PROCEDURE — 87651 STREP A DNA AMP PROBE: CPT | Performed by: NURSE PRACTITIONER

## 2025-01-14 PROCEDURE — 99213 OFFICE O/P EST LOW 20 MIN: CPT | Performed by: NURSE PRACTITIONER

## 2025-01-14 PROCEDURE — G8427 DOCREV CUR MEDS BY ELIG CLIN: HCPCS | Performed by: NURSE PRACTITIONER

## 2025-01-14 PROCEDURE — 87502 INFLUENZA DNA AMP PROBE: CPT | Performed by: NURSE PRACTITIONER

## 2025-01-14 PROCEDURE — 1036F TOBACCO NON-USER: CPT | Performed by: NURSE PRACTITIONER

## 2025-01-14 PROCEDURE — 3074F SYST BP LT 130 MM HG: CPT | Performed by: NURSE PRACTITIONER

## 2025-01-14 RX ORDER — OSELTAMIVIR PHOSPHATE 75 MG/1
75 CAPSULE ORAL 2 TIMES DAILY
Qty: 10 CAPSULE | Refills: 0 | Status: SHIPPED | OUTPATIENT
Start: 2025-01-14 | End: 2025-01-19

## 2025-01-14 RX ORDER — METHYLPREDNISOLONE 4 MG/1
TABLET ORAL
Qty: 1 KIT | Refills: 0 | Status: SHIPPED | OUTPATIENT
Start: 2025-01-14 | End: 2025-01-20

## 2025-01-14 SDOH — ECONOMIC STABILITY: FOOD INSECURITY: WITHIN THE PAST 12 MONTHS, THE FOOD YOU BOUGHT JUST DIDN'T LAST AND YOU DIDN'T HAVE MONEY TO GET MORE.: NEVER TRUE

## 2025-01-14 SDOH — ECONOMIC STABILITY: FOOD INSECURITY: WITHIN THE PAST 12 MONTHS, YOU WORRIED THAT YOUR FOOD WOULD RUN OUT BEFORE YOU GOT MONEY TO BUY MORE.: NEVER TRUE

## 2025-01-14 ASSESSMENT — ENCOUNTER SYMPTOMS
STRIDOR: 0
ABDOMINAL DISTENTION: 0
SINUS PRESSURE: 0
COUGH: 1
COLOR CHANGE: 0
WHEEZING: 1
BACK PAIN: 0
DIARRHEA: 0
VOMITING: 0
ABDOMINAL PAIN: 0
SORE THROAT: 1
SHORTNESS OF BREATH: 0
CHEST TIGHTNESS: 0
CONSTIPATION: 0
NAUSEA: 0

## 2025-01-14 ASSESSMENT — PATIENT HEALTH QUESTIONNAIRE - PHQ9
1. LITTLE INTEREST OR PLEASURE IN DOING THINGS: NOT AT ALL
SUM OF ALL RESPONSES TO PHQ QUESTIONS 1-9: 0
SUM OF ALL RESPONSES TO PHQ QUESTIONS 1-9: 0
2. FEELING DOWN, DEPRESSED OR HOPELESS: NOT AT ALL
SUM OF ALL RESPONSES TO PHQ QUESTIONS 1-9: 0
SUM OF ALL RESPONSES TO PHQ QUESTIONS 1-9: 0
SUM OF ALL RESPONSES TO PHQ9 QUESTIONS 1 & 2: 0

## 2025-01-14 NOTE — PROGRESS NOTES
Kierra Yañez (:  1975) is a 49 y.o. female,Established patient, here for evaluation of the following chief complaint(s):  Fever, Generalized Body Aches (Started Monday), Cough (Productive at times), Pharyngitis, and Headache         Assessment & Plan  Influenza A    Flu a pos.   Fluids.  Tamiflu.   Otc symptomatic relievers.   Rest.          Orders:    oseltamivir (TAMIFLU) 75 MG capsule; Take 1 capsule by mouth 2 times daily for 5 days    Wheezing   prednisone         Fever, unspecified fever cause       Orders:    POCT Rapid Strep A DNA (Alere i)    POCT Influenza A/B DNA (Alere i)    Body aches       Orders:    POCT Rapid Strep A DNA (Alere i)    POCT Influenza A/B DNA (Alere i)    Sore throat       Orders:    POCT Rapid Strep A DNA (Alere i)    POCT Influenza A/B DNA (Alere i)      Return if symptoms worsen or fail to improve.       Subjective   HPI  Monday morning started with fatigue and body aches.   Congestion, cough, ear ache, sore throat.  Headache.     Fevers.    102 Monday morning.    Motrin 800 mg.    Today 101.7.      no sob.     Is a school nurse.    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   Did get flu shot.  Review of Systems   Constitutional:  Positive for fatigue and fever. Negative for activity change, appetite change, chills, diaphoresis and unexpected weight change.   HENT:  Positive for congestion, ear pain, postnasal drip and sore throat. Negative for sinus pressure.    Eyes:  Negative for visual disturbance.   Respiratory:  Positive for cough and wheezing. Negative for chest tightness, shortness of breath and stridor.    Cardiovascular:  Negative for chest pain, palpitations and leg swelling.   Gastrointestinal:  Negative for abdominal distention, abdominal pain, constipation, diarrhea, nausea and vomiting.   Endocrine: Negative for polydipsia, polyphagia and polyuria.   Genitourinary:  Negative for decreased urine volume, difficulty urinating, dysuria, flank pain, frequency, hematuria and urgency.

## 2025-01-21 NOTE — PATIENT INSTRUCTIONS
Your Provider for Today: Dr. Navarro  Your nurses for today: Meenakshi and     You may receive a survey regarding the care you received during your visit.  Your input is valuable to us.  We encourage you to complete and return your survey.  We hope you will choose us in the future for your healthcare needs.

## 2025-01-22 ENCOUNTER — OFFICE VISIT (OUTPATIENT)
Dept: CARDIOLOGY CLINIC | Age: 50
End: 2025-01-22
Payer: COMMERCIAL

## 2025-01-22 VITALS
SYSTOLIC BLOOD PRESSURE: 118 MMHG | BODY MASS INDEX: 43.4 KG/M2 | WEIGHT: 293 LBS | DIASTOLIC BLOOD PRESSURE: 60 MMHG | HEART RATE: 84 BPM | HEIGHT: 69 IN

## 2025-01-22 DIAGNOSIS — R55 SYNCOPE, UNSPECIFIED SYNCOPE TYPE: ICD-10-CM

## 2025-01-22 DIAGNOSIS — I25.10 ASCVD (ARTERIOSCLEROTIC CARDIOVASCULAR DISEASE): Primary | ICD-10-CM

## 2025-01-22 PROCEDURE — G8428 CUR MEDS NOT DOCUMENT: HCPCS | Performed by: INTERNAL MEDICINE

## 2025-01-22 PROCEDURE — 99214 OFFICE O/P EST MOD 30 MIN: CPT | Performed by: INTERNAL MEDICINE

## 2025-01-22 PROCEDURE — 1036F TOBACCO NON-USER: CPT | Performed by: INTERNAL MEDICINE

## 2025-01-22 PROCEDURE — 93000 ELECTROCARDIOGRAM COMPLETE: CPT | Performed by: INTERNAL MEDICINE

## 2025-01-22 PROCEDURE — G8417 CALC BMI ABV UP PARAM F/U: HCPCS | Performed by: INTERNAL MEDICINE

## 2025-01-22 PROCEDURE — 3074F SYST BP LT 130 MM HG: CPT | Performed by: INTERNAL MEDICINE

## 2025-01-22 PROCEDURE — 3078F DIAST BP <80 MM HG: CPT | Performed by: INTERNAL MEDICINE

## 2025-01-22 NOTE — PROGRESS NOTES
Pt here for 2 yr check up     EKG done today       Pt states med list is correct from last appt 1/14/25    Pt continues with dizziness at times,last noticed a few days ago had to sit down

## 2025-01-22 NOTE — PROGRESS NOTES
[facility]  [facility]  [address]  [facility] [address]  Dept: [phone]  Dept Fax: [phone]  Loc: [phone]    Visit Date: 1/22/2025    Ms. Yañez is a 49 y.o. female  who presented for:    HPI:   48 yo F c hx of HTN, headaches, s/p gastric sleeve, YIFAN s here for a follow up.   Echo from 2018 EF 60%, RVSP 40mm Hg.   She underwent coronary CTA which was normal.         Current Outpatient Medications:     nystatin (MYCOSTATIN) 940136 UNIT/GM powder, APPLY TO THE AFFECTED AREAS BY TOPICAL ROUTE 2 TIMES PER DAY FOR 30 DAYS, Disp: , Rfl:     pantoprazole (PROTONIX) 40 MG tablet, Take 1 tablet by mouth every morning (before breakfast), Disp: 90 tablet, Rfl: 1    SUMAtriptan (IMITREX) 100 MG tablet, TAKE 1 TABLET BY MOUTH IF NEEDED FOR MIGRAINES, Disp: 18 tablet, Rfl: 5    escitalopram (LEXAPRO) 20 MG tablet, Take 1 tablet by mouth once daily, Disp: 180 tablet, Rfl: 0    busPIRone (BUSPAR) 15 MG tablet, Take 1 tablet by mouth twice daily, Disp: 180 tablet, Rfl: 0    levothyroxine (SYNTHROID) 75 MCG tablet, Take 4 tablets by mouth daily, Disp: 360 tablet, Rfl: 3    ondansetron (ZOFRAN-ODT) 4 MG disintegrating tablet, Take 1 tablet by mouth 3 times daily as needed for Nausea or Vomiting, Disp: 21 tablet, Rfl: 0    lisinopril-hydroCHLOROthiazide (PRINZIDE;ZESTORETIC) 20-25 MG per tablet, take 1 tablet by mouth once daily, Disp: 90 tablet, Rfl: 3    Rimegepant Sulfate 75 MG TBDP, Take 75 mg by mouth every other day, Disp: 45 tablet, Rfl: 1    Tirzepatide (MOUNJARO) 5 MG/0.5ML SOPN SC injection, Inject 0.5 mLs into the skin once a week, Disp: 2 mL, Rfl: 0    vitamin B-12 (CYANOCOBALAMIN) 1000 MCG tablet, Take 1 tablet by mouth daily, Disp: , Rfl:     Cholecalciferol (VITAMIN D3) 40778 UNITS CAPS, Take 1 capsule by mouth once a week, Disp: 4 capsule, Rfl: 1    calcium carbonate (OSCAL) 500 MG TABS tablet, Take 1 tablet by mouth 3 times daily, Disp: , Rfl:

## 2025-02-22 DIAGNOSIS — F41.1 GENERALIZED ANXIETY DISORDER: ICD-10-CM

## 2025-02-24 ENCOUNTER — PATIENT MESSAGE (OUTPATIENT)
Dept: FAMILY MEDICINE CLINIC | Age: 50
End: 2025-02-24

## 2025-02-24 DIAGNOSIS — F41.1 GENERALIZED ANXIETY DISORDER: ICD-10-CM

## 2025-02-24 DIAGNOSIS — G43.701 CHRONIC MIGRAINE WITHOUT AURA WITH STATUS MIGRAINOSUS, NOT INTRACTABLE: ICD-10-CM

## 2025-02-24 RX ORDER — BUSPIRONE HYDROCHLORIDE 15 MG/1
15 TABLET ORAL 2 TIMES DAILY
Qty: 180 TABLET | Refills: 0 | Status: SHIPPED | OUTPATIENT
Start: 2025-02-24

## 2025-02-24 NOTE — TELEPHONE ENCOUNTER
This medication refill is regarding a electronic request.  Refill requested by patient.    Requested Prescriptions     Pending Prescriptions Disp Refills    LORazepam (ATIVAN) 0.5 MG tablet [Pharmacy Med Name: LORazepam 0.5 MG Oral Tablet] 60 tablet 0     Sig: Take 1 tablet by mouth 2 times daily as needed.       Date of last visit: 1/14/2025  Date of next visit: Visit date not found  Date of last refill: 11/15/2024  Pharmacy Name: [facility]

## 2025-02-25 DIAGNOSIS — F41.1 GENERALIZED ANXIETY DISORDER: ICD-10-CM

## 2025-02-25 RX ORDER — LORAZEPAM 0.5 MG/1
0.5 TABLET ORAL 2 TIMES DAILY PRN
Qty: 60 TABLET | Refills: 2 | Status: SHIPPED | OUTPATIENT
Start: 2025-02-25 | End: 2025-05-26

## 2025-02-25 RX ORDER — LORAZEPAM 0.5 MG/1
0.5 TABLET ORAL 2 TIMES DAILY PRN
Qty: 60 TABLET | Refills: 0 | OUTPATIENT
Start: 2025-02-25

## 2025-02-25 RX ORDER — RIMEGEPANT SULFATE 75 MG/75MG
TABLET, ORALLY DISINTEGRATING ORAL
Qty: 10 TABLET | Refills: 0 | Status: SHIPPED | OUTPATIENT
Start: 2025-02-25

## 2025-02-25 NOTE — TELEPHONE ENCOUNTER
This medication refill is regarding a electronic request.  Refill requested by patient.    Requested Prescriptions     Pending Prescriptions Disp Refills    NURTEC 75 MG TBDP [Pharmacy Med Name: Nurtec 75 MG Oral Tablet Disintegrating] 10 tablet 0     Sig: DISSOLVE 1 TABLET BY MOUTH EVERY OTHER DAY       Date of last visit: 1/14/2025  Date of next visit: 6/9/2025  Date of last refill: 01/31/2024  Pharmacy Name: [facility]

## 2025-02-26 ENCOUNTER — PATIENT MESSAGE (OUTPATIENT)
Dept: FAMILY MEDICINE CLINIC | Age: 50
End: 2025-02-26

## 2025-02-26 DIAGNOSIS — R04.0 RECURRENT EPISTAXIS: Primary | ICD-10-CM

## 2025-04-08 ENCOUNTER — OFFICE VISIT (OUTPATIENT)
Dept: FAMILY MEDICINE CLINIC | Age: 50
End: 2025-04-08
Payer: COMMERCIAL

## 2025-04-08 VITALS
BODY MASS INDEX: 47.4 KG/M2 | DIASTOLIC BLOOD PRESSURE: 78 MMHG | RESPIRATION RATE: 19 BRPM | SYSTOLIC BLOOD PRESSURE: 118 MMHG | HEART RATE: 63 BPM | OXYGEN SATURATION: 97 % | WEIGHT: 293 LBS | TEMPERATURE: 97.8 F

## 2025-04-08 DIAGNOSIS — J02.9 SORE THROAT: ICD-10-CM

## 2025-04-08 DIAGNOSIS — J01.00 ACUTE NON-RECURRENT MAXILLARY SINUSITIS: Primary | ICD-10-CM

## 2025-04-08 DIAGNOSIS — J40 BRONCHITIS: ICD-10-CM

## 2025-04-08 LAB — STREPTOCOCCUS A RNA: NEGATIVE

## 2025-04-08 PROCEDURE — 1036F TOBACCO NON-USER: CPT | Performed by: NURSE PRACTITIONER

## 2025-04-08 PROCEDURE — 3074F SYST BP LT 130 MM HG: CPT | Performed by: NURSE PRACTITIONER

## 2025-04-08 PROCEDURE — G8427 DOCREV CUR MEDS BY ELIG CLIN: HCPCS | Performed by: NURSE PRACTITIONER

## 2025-04-08 PROCEDURE — G8417 CALC BMI ABV UP PARAM F/U: HCPCS | Performed by: NURSE PRACTITIONER

## 2025-04-08 PROCEDURE — 87651 STREP A DNA AMP PROBE: CPT | Performed by: NURSE PRACTITIONER

## 2025-04-08 PROCEDURE — 3078F DIAST BP <80 MM HG: CPT | Performed by: NURSE PRACTITIONER

## 2025-04-08 PROCEDURE — 99213 OFFICE O/P EST LOW 20 MIN: CPT | Performed by: NURSE PRACTITIONER

## 2025-04-08 RX ORDER — PREDNISONE 20 MG/1
20 TABLET ORAL 2 TIMES DAILY
Qty: 10 TABLET | Refills: 0 | Status: SHIPPED | OUTPATIENT
Start: 2025-04-08 | End: 2025-04-13

## 2025-04-08 RX ORDER — DOXYCYCLINE HYCLATE 100 MG
100 TABLET ORAL 2 TIMES DAILY
Qty: 14 TABLET | Refills: 0 | Status: SHIPPED | OUTPATIENT
Start: 2025-04-08 | End: 2025-04-15

## 2025-04-08 ASSESSMENT — ENCOUNTER SYMPTOMS
CONSTIPATION: 0
ABDOMINAL PAIN: 0
SINUS PAIN: 0
COLOR CHANGE: 0
NAUSEA: 0
COUGH: 0
ABDOMINAL DISTENTION: 0
BACK PAIN: 0
SHORTNESS OF BREATH: 0
CHEST TIGHTNESS: 0
DIARRHEA: 0
WHEEZING: 1
SINUS PRESSURE: 1
VOMITING: 0
SORE THROAT: 1
STRIDOR: 0

## 2025-04-08 NOTE — PROGRESS NOTES
appointment consented to the use of AI, including the recording.      An electronic signature was used to authenticate this note.    --Riley Vegas, NICO - CNP

## 2025-04-13 ENCOUNTER — PATIENT MESSAGE (OUTPATIENT)
Dept: FAMILY MEDICINE CLINIC | Age: 50
End: 2025-04-13

## 2025-04-14 RX ORDER — FLUCONAZOLE 150 MG/1
150 TABLET ORAL
Qty: 2 TABLET | Refills: 0 | Status: SHIPPED | OUTPATIENT
Start: 2025-04-14 | End: 2025-04-20

## 2025-04-20 DIAGNOSIS — G43.701 CHRONIC MIGRAINE WITHOUT AURA WITH STATUS MIGRAINOSUS, NOT INTRACTABLE: ICD-10-CM

## 2025-04-21 RX ORDER — RIMEGEPANT SULFATE 75 MG/75MG
TABLET, ORALLY DISINTEGRATING ORAL
Qty: 10 TABLET | Refills: 0 | Status: SHIPPED | OUTPATIENT
Start: 2025-04-21

## 2025-04-21 NOTE — TELEPHONE ENCOUNTER
This medication refill is regarding a electronic request.  Refill requested by patient.    Requested Prescriptions     Pending Prescriptions Disp Refills    NURTEC 75 MG TBDP [Pharmacy Med Name: Nurtec 75 MG Oral Tablet Disintegrating] 10 tablet 0     Sig: DISSOLVE 1 TABLET BY MOUTH EVERY OTHER DAY       Date of last visit: 4/8/2025  Date of next visit: 6/9/2025  Date of last refill: 02/25/2025  Pharmacy Name: [facility]

## 2025-05-05 DIAGNOSIS — I10 ESSENTIAL HYPERTENSION: ICD-10-CM

## 2025-05-05 DIAGNOSIS — F43.21 GRIEF: ICD-10-CM

## 2025-05-05 DIAGNOSIS — G43.701 CHRONIC MIGRAINE WITHOUT AURA WITH STATUS MIGRAINOSUS, NOT INTRACTABLE: ICD-10-CM

## 2025-05-06 RX ORDER — LISINOPRIL AND HYDROCHLOROTHIAZIDE 20; 25 MG/1; MG/1
1 TABLET ORAL DAILY
Qty: 90 TABLET | Refills: 0 | Status: SHIPPED | OUTPATIENT
Start: 2025-05-06

## 2025-05-06 RX ORDER — ESCITALOPRAM OXALATE 20 MG/1
20 TABLET ORAL DAILY
Qty: 90 TABLET | Refills: 0 | Status: SHIPPED | OUTPATIENT
Start: 2025-05-06

## 2025-05-06 RX ORDER — RIMEGEPANT SULFATE 75 MG/75MG
TABLET, ORALLY DISINTEGRATING ORAL
Qty: 10 TABLET | Refills: 0 | Status: SHIPPED | OUTPATIENT
Start: 2025-05-06

## 2025-06-09 ENCOUNTER — OFFICE VISIT (OUTPATIENT)
Dept: FAMILY MEDICINE CLINIC | Age: 50
End: 2025-06-09
Payer: COMMERCIAL

## 2025-06-09 VITALS
SYSTOLIC BLOOD PRESSURE: 124 MMHG | DIASTOLIC BLOOD PRESSURE: 82 MMHG | TEMPERATURE: 98.4 F | RESPIRATION RATE: 18 BRPM | HEART RATE: 72 BPM | OXYGEN SATURATION: 98 % | WEIGHT: 293 LBS | HEIGHT: 69 IN | BODY MASS INDEX: 43.4 KG/M2

## 2025-06-09 DIAGNOSIS — Z13.1 SCREENING FOR DIABETES MELLITUS: ICD-10-CM

## 2025-06-09 DIAGNOSIS — F41.1 GENERALIZED ANXIETY DISORDER: ICD-10-CM

## 2025-06-09 DIAGNOSIS — K21.9 GASTROESOPHAGEAL REFLUX DISEASE WITHOUT ESOPHAGITIS: ICD-10-CM

## 2025-06-09 DIAGNOSIS — Z00.00 ANNUAL PHYSICAL EXAM: ICD-10-CM

## 2025-06-09 DIAGNOSIS — I10 ESSENTIAL HYPERTENSION: Primary | ICD-10-CM

## 2025-06-09 DIAGNOSIS — E03.9 ACQUIRED HYPOTHYROIDISM: ICD-10-CM

## 2025-06-09 DIAGNOSIS — Z13.220 SCREENING FOR LIPOID DISORDERS: ICD-10-CM

## 2025-06-09 DIAGNOSIS — Z12.11 SCREENING FOR MALIGNANT NEOPLASM OF COLON: ICD-10-CM

## 2025-06-09 DIAGNOSIS — E66.01 MORBID OBESITY DUE TO EXCESS CALORIES (HCC): ICD-10-CM

## 2025-06-09 PROCEDURE — 3074F SYST BP LT 130 MM HG: CPT | Performed by: FAMILY MEDICINE

## 2025-06-09 PROCEDURE — 3079F DIAST BP 80-89 MM HG: CPT | Performed by: FAMILY MEDICINE

## 2025-06-09 PROCEDURE — 99396 PREV VISIT EST AGE 40-64: CPT | Performed by: FAMILY MEDICINE

## 2025-06-09 RX ORDER — PANTOPRAZOLE SODIUM 40 MG/1
40 TABLET, DELAYED RELEASE ORAL
Qty: 90 TABLET | Refills: 3 | Status: SHIPPED | OUTPATIENT
Start: 2025-06-09 | End: 2025-09-07

## 2025-06-09 RX ORDER — BUSPIRONE HYDROCHLORIDE 15 MG/1
15 TABLET ORAL 2 TIMES DAILY
Qty: 180 TABLET | Refills: 3 | Status: SHIPPED | OUTPATIENT
Start: 2025-06-09

## 2025-06-09 RX ORDER — BUSPIRONE HYDROCHLORIDE 15 MG/1
15 TABLET ORAL 2 TIMES DAILY
Qty: 180 TABLET | Refills: 0 | Status: SHIPPED | OUTPATIENT
Start: 2025-06-09 | End: 2025-06-09 | Stop reason: SDUPTHER

## 2025-06-09 ASSESSMENT — ENCOUNTER SYMPTOMS
COUGH: 0
SORE THROAT: 0
NAUSEA: 0
RHINORRHEA: 0
CONSTIPATION: 0
ABDOMINAL PAIN: 0
DIARRHEA: 0
WHEEZING: 0
SHORTNESS OF BREATH: 0

## 2025-06-09 NOTE — PROGRESS NOTES
motion.      Cervical back: Normal range of motion and neck supple.   Lymphadenopathy:      Cervical: No cervical adenopathy.   Skin:     General: Skin is warm and dry.      Findings: No rash.   Neurological:      Mental Status: She is alert and oriented to person, place, and time. Mental status is at baseline.      Motor: No abnormal muscle tone.      Coordination: Coordination normal.   Psychiatric:         Mood and Affect: Mood and affect normal.         Behavior: Behavior normal.         Thought Content: Thought content normal.         Judgment: Judgment normal.             Latest Ref Rng & Units 12/5/2024     6:25 PM 5/15/2024     6:00 AM 1/26/2024     7:23 PM   [facility]   A1C 4.4 - 6.4 %  5.2     A1C POC 4.4 - 6.4 %  5.2     GLU random 70 - 108 mg/dL  77  98     - 145 meq/L  139  134    K 3.5 - 5.2 meq/L  4.1  3.5    BUN 7 - 22 mg/dL  12  14    CR 0.4 - 1.2 mg/dL  0.5  0.9    GFR >60 ml/min/1.73m2  > 90  > 60    CA 8.5 - 10.5 mg/dL  8.9     ALT 14 - 63 U/L   23    AST 15 - 37 U/L   18    TSH 0.400 - 4.200 uIU/mL 0.692      HGB 12.0 - 16.0 gm/dl   13.7        Lab Results   Component Value Date/Time    CHOL 139 08/31/2023 08:32 AM    CHOL 173 09/08/2022 06:55 AM    CHOL 142 05/02/2019 07:50 AM    TRIG 50 08/31/2023 08:32 AM    TRIG 75 09/08/2022 06:55 AM    TRIG 58 05/02/2019 07:50 AM    HDL 50 08/31/2023 08:32 AM    HDL 51 09/08/2022 06:55 AM    HDL 56 05/02/2019 07:50 AM    GLUCOSE 77 05/15/2024 06:00 AM    GLUCOSE 87 05/12/2012 03:40 PM    LABA1C 5.2 05/15/2024 06:00 AM    LABA1C 4.9 10/05/2017 09:16 AM    LABA1C 5.7 06/27/2016 07:14 AM       The 10-year ASCVD risk score (Annie NELSON, et al., 2019) is: 1%    Values used to calculate the score:      Age: 50 years      Sex: Female      Is Non- : No      Diabetic: No      Tobacco smoker: No      Systolic Blood Pressure: 124 mmHg      Is BP treated: Yes      HDL Cholesterol: 50 mg/dL      Total Cholesterol: 139

## 2025-06-09 NOTE — TELEPHONE ENCOUNTER
This medication refill is regarding a electronic request.  Refill requested by patient.    Requested Prescriptions     Pending Prescriptions Disp Refills    busPIRone (BUSPAR) 15 MG tablet [Pharmacy Med Name: busPIRone HCl 15 MG Oral Tablet] 180 tablet 0     Sig: Take 1 tablet by mouth twice daily       Date of last visit: 4/8/2025  Date of next visit: 6/9/2025  Date of last refill: 02/24/2025  Pharmacy Name: [facility]

## 2025-06-12 ENCOUNTER — LAB (OUTPATIENT)
Dept: LAB | Age: 50
End: 2025-06-12

## 2025-06-12 DIAGNOSIS — E03.9 ACQUIRED HYPOTHYROIDISM: ICD-10-CM

## 2025-06-12 DIAGNOSIS — E66.01 MORBID OBESITY DUE TO EXCESS CALORIES (HCC): ICD-10-CM

## 2025-06-12 DIAGNOSIS — Z00.00 ANNUAL PHYSICAL EXAM: ICD-10-CM

## 2025-06-12 DIAGNOSIS — Z13.220 SCREENING FOR LIPOID DISORDERS: ICD-10-CM

## 2025-06-12 DIAGNOSIS — I10 ESSENTIAL HYPERTENSION: ICD-10-CM

## 2025-06-12 LAB
ALBUMIN SERPL BCG-MCNC: 3.6 G/DL (ref 3.4–4.9)
ALP SERPL-CCNC: 67 U/L (ref 38–126)
ALT SERPL W/O P-5'-P-CCNC: 18 U/L (ref 10–35)
ANION GAP SERPL CALC-SCNC: 12 MEQ/L (ref 8–16)
AST SERPL-CCNC: 19 U/L (ref 10–35)
BASOPHILS ABSOLUTE: 0 THOU/MM3 (ref 0–0.1)
BASOPHILS NFR BLD AUTO: 0.4 %
BILIRUB SERPL-MCNC: 0.5 MG/DL (ref 0.3–1.2)
BUN SERPL-MCNC: 13 MG/DL (ref 8–23)
CALCIUM SERPL-MCNC: 8.9 MG/DL (ref 8.6–10)
CHLORIDE SERPL-SCNC: 102 MEQ/L (ref 98–111)
CHOLEST SERPL-MCNC: 146 MG/DL (ref 100–199)
CO2 SERPL-SCNC: 24 MEQ/L (ref 22–29)
CREAT SERPL-MCNC: 0.6 MG/DL (ref 0.5–0.9)
DEPRECATED RDW RBC AUTO: 50 FL (ref 35–45)
EOSINOPHIL NFR BLD AUTO: 3.6 %
EOSINOPHILS ABSOLUTE: 0.3 THOU/MM3 (ref 0–0.4)
ERYTHROCYTE [DISTWIDTH] IN BLOOD BY AUTOMATED COUNT: 15.5 % (ref 11.5–14.5)
GFR SERPL CREATININE-BSD FRML MDRD: > 90 ML/MIN/1.73M2
GLUCOSE SERPL-MCNC: 93 MG/DL (ref 74–109)
HCT VFR BLD AUTO: 40.9 % (ref 37–47)
HDLC SERPL-MCNC: 48 MG/DL
HGB BLD-MCNC: 12.9 GM/DL (ref 12–16)
IMM GRANULOCYTES # BLD AUTO: 0.01 THOU/MM3 (ref 0–0.07)
IMM GRANULOCYTES NFR BLD AUTO: 0.1 %
LDLC SERPL CALC-MCNC: 84 MG/DL
LYMPHOCYTES ABSOLUTE: 3.3 THOU/MM3 (ref 1–4.8)
LYMPHOCYTES NFR BLD AUTO: 39.3 %
MCH RBC QN AUTO: 28 PG (ref 26–33)
MCHC RBC AUTO-ENTMCNC: 31.5 GM/DL (ref 32.2–35.5)
MCV RBC AUTO: 88.7 FL (ref 81–99)
MONOCYTES ABSOLUTE: 0.7 THOU/MM3 (ref 0.4–1.3)
MONOCYTES NFR BLD AUTO: 8.2 %
NEUTROPHILS ABSOLUTE: 4 THOU/MM3 (ref 1.8–7.7)
NEUTROPHILS NFR BLD AUTO: 48.4 %
NRBC BLD AUTO-RTO: 0 /100 WBC
PLATELET # BLD AUTO: 356 THOU/MM3 (ref 130–400)
PMV BLD AUTO: 10.7 FL (ref 9.4–12.4)
POTASSIUM SERPL-SCNC: 3.9 MEQ/L (ref 3.5–5.2)
PROT SERPL-MCNC: 6 G/DL (ref 6.4–8.3)
RBC # BLD AUTO: 4.61 MILL/MM3 (ref 4.2–5.4)
SODIUM SERPL-SCNC: 138 MEQ/L (ref 135–145)
T4 FREE SERPL-MCNC: 1.5 NG/DL (ref 0.92–1.68)
TRIGL SERPL-MCNC: 69 MG/DL (ref 0–199)
TSH SERPL DL<=0.05 MIU/L-ACNC: 0.04 UIU/ML (ref 0.27–4.2)
WBC # BLD AUTO: 8.3 THOU/MM3 (ref 4.8–10.8)

## 2025-06-13 LAB — INSULIN SERPL-ACNC: 25.4 MU/L

## 2025-06-22 ENCOUNTER — RESULTS FOLLOW-UP (OUTPATIENT)
Dept: FAMILY MEDICINE CLINIC | Age: 50
End: 2025-06-22

## 2025-06-22 DIAGNOSIS — E03.9 ACQUIRED HYPOTHYROIDISM: Primary | ICD-10-CM

## 2025-07-30 DIAGNOSIS — I10 ESSENTIAL HYPERTENSION: ICD-10-CM

## 2025-07-30 RX ORDER — LISINOPRIL AND HYDROCHLOROTHIAZIDE 20; 25 MG/1; MG/1
1 TABLET ORAL DAILY
Qty: 90 TABLET | Refills: 0 | Status: SHIPPED | OUTPATIENT
Start: 2025-07-30

## 2025-08-29 DIAGNOSIS — E03.9 ACQUIRED HYPOTHYROIDISM: ICD-10-CM

## 2025-08-29 RX ORDER — LEVOTHYROXINE SODIUM 75 UG/1
TABLET ORAL
Qty: 360 TABLET | Refills: 0 | Status: SHIPPED | OUTPATIENT
Start: 2025-08-29

## 2025-09-01 ENCOUNTER — APPOINTMENT (OUTPATIENT)
Dept: CT IMAGING | Age: 50
End: 2025-09-01
Payer: COMMERCIAL

## 2025-09-01 ENCOUNTER — HOSPITAL ENCOUNTER (EMERGENCY)
Age: 50
Discharge: HOME | End: 2025-09-01
Attending: FAMILY MEDICINE
Payer: COMMERCIAL

## 2025-09-01 VITALS
HEART RATE: 105 BPM | WEIGHT: 293 LBS | BODY MASS INDEX: 45.76 KG/M2 | TEMPERATURE: 98.8 F | DIASTOLIC BLOOD PRESSURE: 57 MMHG | OXYGEN SATURATION: 93 % | SYSTOLIC BLOOD PRESSURE: 124 MMHG | RESPIRATION RATE: 16 BRPM

## 2025-09-01 DIAGNOSIS — R10.33 PERIUMBILICAL ABDOMINAL PAIN: Primary | ICD-10-CM

## 2025-09-01 LAB
ALBUMIN SERPL BCP-MCNC: 3.2 GM/DL (ref 3.4–5)
ALP SERPL-CCNC: 83 U/L (ref 46–116)
ALT SERPL W P-5'-P-CCNC: 33 U/L (ref 14–63)
AMORPH SED URNS QL MICRO: NORMAL
ANION GAP SERPL CALC-SCNC: 4 MEQ/L (ref 8–16)
AST SERPL W P-5'-P-CCNC: 37 U/L (ref 15–37)
B-HCG SERPL QL: NEGATIVE
BACTERIA URNS QL MICRO: NORMAL
BASOPHILS # BLD: 0.1 % (ref 0–3)
BASOPHILS ABSOLUTE: 0 THOU/MM3 (ref 0–0.1)
BILIRUB SERPL-MCNC: 0.9 MG/DL (ref 0.2–1)
BILIRUB UR QL STRIP.AUTO: NEGATIVE
BUN SERPL-MCNC: 7 MG/DL (ref 7–18)
CALCIUM SERPL-MCNC: 8.7 MG/DL (ref 8.5–10.1)
CASTS #/AREA URNS LPF: NORMAL /LPF
CHARACTER UR: CLEAR
CHLORIDE SERPL-SCNC: 101 MEQ/L (ref 98–107)
CO2 SERPL-SCNC: 34 MEQ/L (ref 21–32)
COLOR UR AUTO: NORMAL
CREAT SERPL-MCNC: 0.7 MG/DL (ref 0.6–1.3)
CRYSTALS VITF MICRO: NORMAL
EOSINOPHILS ABSOLUTE: 0 THOU/MM3 (ref 0–0.5)
EOSINOPHILS RELATIVE PERCENT: 0.1 % (ref 0–4)
EPI CELLS #/AREA URNS HPF: NORMAL /HPF
GFR SERPL CREATININE-BSD FRML MDRD: > 90 ML/MIN/1.73M2
GLUCOSE SERPL-MCNC: 119 MG/DL (ref 74–106)
GLUCOSE UR QL STRIP.AUTO: NEGATIVE MG/DL
HCT VFR BLD CALC: 35.5 % (ref 37–47)
HEMOGLOBIN: 11.6 GM/DL (ref 12–16)
HGB UR STRIP.AUTO-MCNC: NEGATIVE MG/L
IMMATURE GRANS (ABS): 0 THOU/MM3 (ref 0–0.07)
IMMATURE GRANULOCYTES %: 0 %
KETONES UR QL STRIP.AUTO: NEGATIVE
LEUKOCYTE ESTERASE UR QL STRIP.AUTO: NEGATIVE
LIPASE SERPL-CCNC: 22 U/L (ref 16–77)
LYMPHOCYTES # BLD AUTO: 8.4 % (ref 15–47)
LYMPHOCYTES ABSOLUTE: 1.3 THOU/MM3 (ref 1–4.8)
MCH RBC QN AUTO: 28.5 PG (ref 26–32)
MCHC RBC AUTO-ENTMCNC: 32.7 GM/DL (ref 31–35)
MCV RBC AUTO: 87.2 FL (ref 81–99)
MONOCYTES: 0.7 THOU/MM3 (ref 0.3–1.3)
MONOCYTES: 4.5 % (ref 0–12)
MUCOUS THREADS URNS QL MICRO: NORMAL
NEUTROPHILS ABSOLUTE: 13.1 THOU/MM3 (ref 1.8–7.7)
NITRITE UR QL STRIP.AUTO: NEGATIVE
PDW BLD-RTO: 14.8 % (ref 11.5–14.9)
PH UR STRIP.AUTO: 7.5 [PH] (ref 5–9)
PLATELET # BLD AUTO: 275 THOU/MM3 (ref 130–400)
PMV BLD AUTO: 9.4 FL (ref 9.4–12.4)
POTASSIUM SERPL-SCNC: 4.1 MEQ/L (ref 3.5–5.1)
PROT SERPL-MCNC: 6.3 GM/DL (ref 6.4–8.2)
PROT UR STRIP.AUTO-MCNC: NEGATIVE MG/DL
RBC # BLD: 4.07 MILL/MM3 (ref 4.1–5.3)
RBC #/AREA URNS HPF: NORMAL /HPF
REFLEX TO URINE C & S: NORMAL
SEG NEUTROPHILS: 86.8 % (ref 43–75)
SODIUM SERPL-SCNC: 139 MEQ/L (ref 136–145)
SP GR UR STRIP.AUTO: 1.01 (ref 1–1.03)
TROPONIN, HIGH SENSITIVITY: 10.5 PG/ML (ref 0–51.3)
UROBILINOGEN UR STRIP-ACNC: 0.2 EU/DL (ref 0–1)
WBC # BLD: 15.1 THOU/MM3 (ref 4.8–10.8)
WBC # UR STRIP.AUTO: NORMAL /HPF

## 2025-09-01 PROCEDURE — 2580000003 HC RX 258: Performed by: FAMILY MEDICINE

## 2025-09-01 PROCEDURE — 6360000002 HC RX W HCPCS: Performed by: FAMILY MEDICINE

## 2025-09-01 PROCEDURE — 83690 ASSAY OF LIPASE: CPT

## 2025-09-01 PROCEDURE — 96361 HYDRATE IV INFUSION ADD-ON: CPT

## 2025-09-01 PROCEDURE — 6360000004 HC RX CONTRAST MEDICATION: Performed by: FAMILY MEDICINE

## 2025-09-01 PROCEDURE — 84484 ASSAY OF TROPONIN QUANT: CPT

## 2025-09-01 PROCEDURE — 93005 ELECTROCARDIOGRAM TRACING: CPT | Performed by: FAMILY MEDICINE

## 2025-09-01 PROCEDURE — 96375 TX/PRO/DX INJ NEW DRUG ADDON: CPT

## 2025-09-01 PROCEDURE — 81001 URINALYSIS AUTO W/SCOPE: CPT

## 2025-09-01 PROCEDURE — 85025 COMPLETE CBC W/AUTO DIFF WBC: CPT

## 2025-09-01 PROCEDURE — 74177 CT ABD & PELVIS W/CONTRAST: CPT

## 2025-09-01 PROCEDURE — 84703 CHORIONIC GONADOTROPIN ASSAY: CPT

## 2025-09-01 PROCEDURE — 80053 COMPREHEN METABOLIC PANEL: CPT

## 2025-09-01 PROCEDURE — 6370000000 HC RX 637 (ALT 250 FOR IP): Performed by: FAMILY MEDICINE

## 2025-09-01 PROCEDURE — 99285 EMERGENCY DEPT VISIT HI MDM: CPT

## 2025-09-01 PROCEDURE — 96374 THER/PROPH/DIAG INJ IV PUSH: CPT

## 2025-09-01 RX ORDER — 0.9 % SODIUM CHLORIDE 0.9 %
1000 INTRAVENOUS SOLUTION INTRAVENOUS ONCE
Status: COMPLETED | OUTPATIENT
Start: 2025-09-01 | End: 2025-09-01

## 2025-09-01 RX ORDER — PANTOPRAZOLE SODIUM 40 MG/10ML
40 INJECTION, POWDER, LYOPHILIZED, FOR SOLUTION INTRAVENOUS DAILY
Status: DISCONTINUED | OUTPATIENT
Start: 2025-09-01 | End: 2025-09-01 | Stop reason: HOSPADM

## 2025-09-01 RX ORDER — ONDANSETRON 2 MG/ML
4 INJECTION INTRAMUSCULAR; INTRAVENOUS ONCE
Status: COMPLETED | OUTPATIENT
Start: 2025-09-01 | End: 2025-09-01

## 2025-09-01 RX ORDER — IOPAMIDOL 755 MG/ML
100 INJECTION, SOLUTION INTRAVASCULAR
Status: COMPLETED | OUTPATIENT
Start: 2025-09-01 | End: 2025-09-01

## 2025-09-01 RX ORDER — ONDANSETRON 4 MG/1
4 TABLET, ORALLY DISINTEGRATING ORAL 3 TIMES DAILY PRN
Qty: 21 TABLET | Refills: 0 | Status: SHIPPED | OUTPATIENT
Start: 2025-09-01

## 2025-09-01 RX ORDER — SUCRALFATE 1 G/1
1 TABLET ORAL 4 TIMES DAILY
Qty: 120 TABLET | Refills: 3 | Status: SHIPPED | OUTPATIENT
Start: 2025-09-01

## 2025-09-01 RX ADMIN — IOPAMIDOL 100 ML: 755 INJECTION, SOLUTION INTRAVENOUS at 13:48

## 2025-09-01 RX ADMIN — HYDROMORPHONE HYDROCHLORIDE 0.5 MG: 1 INJECTION, SOLUTION INTRAMUSCULAR; INTRAVENOUS; SUBCUTANEOUS at 13:28

## 2025-09-01 RX ADMIN — SODIUM CHLORIDE 1000 ML: 0.9 INJECTION, SOLUTION INTRAVENOUS at 13:23

## 2025-09-01 RX ADMIN — ONDANSETRON 4 MG: 2 INJECTION, SOLUTION INTRAMUSCULAR; INTRAVENOUS at 13:27

## 2025-09-01 RX ADMIN — HYDROMORPHONE HYDROCHLORIDE 0.5 MG: 1 INJECTION, SOLUTION INTRAMUSCULAR; INTRAVENOUS; SUBCUTANEOUS at 13:58

## 2025-09-01 RX ADMIN — PANTOPRAZOLE SODIUM 40 MG: 40 INJECTION, POWDER, LYOPHILIZED, FOR SOLUTION INTRAVENOUS at 14:25

## 2025-09-01 ASSESSMENT — PAIN DESCRIPTION - LOCATION
LOCATION: ABDOMEN

## 2025-09-01 ASSESSMENT — ENCOUNTER SYMPTOMS
ABDOMINAL PAIN: 1
VOMITING: 1
ABDOMINAL DISTENTION: 0
NAUSEA: 1

## 2025-09-01 ASSESSMENT — LIFESTYLE VARIABLES: HOW OFTEN DO YOU HAVE A DRINK CONTAINING ALCOHOL: NEVER

## 2025-09-01 ASSESSMENT — PAIN - FUNCTIONAL ASSESSMENT
PAIN_FUNCTIONAL_ASSESSMENT: 0-10

## 2025-09-01 ASSESSMENT — PAIN DESCRIPTION - ORIENTATION: ORIENTATION: MID

## 2025-09-01 ASSESSMENT — PAIN SCALES - GENERAL
PAINLEVEL_OUTOF10: 6
PAINLEVEL_OUTOF10: 6
PAINLEVEL_OUTOF10: 5

## 2025-09-02 ENCOUNTER — HOSPITAL ENCOUNTER (OUTPATIENT)
Dept: CT IMAGING | Age: 50
Discharge: HOME | End: 2025-09-02
Payer: COMMERCIAL

## 2025-09-02 ENCOUNTER — HOSPITAL ENCOUNTER (OUTPATIENT)
Dept: GENERAL RADIOLOGY | Age: 50
Discharge: HOME | End: 2025-09-02
Payer: COMMERCIAL

## 2025-09-02 DIAGNOSIS — R10.33 PERIUMBILICAL PAIN: ICD-10-CM

## 2025-09-02 DIAGNOSIS — R10.10 UPPER ABDOMINAL PAIN: ICD-10-CM

## 2025-09-02 LAB
BASOPHILS # BLD: 0.4 % (ref 0–3)
BASOPHILS ABSOLUTE: 0 THOU/MM3 (ref 0–0.1)
EKG ATRIAL RATE: 90 BPM
EKG P AXIS: 55 DEGREES
EKG P-R INTERVAL: 164 MS
EKG Q-T INTERVAL: 406 MS
EKG QRS DURATION: 82 MS
EKG QTC CALCULATION (BAZETT): 496 MS
EKG R AXIS: -9 DEGREES
EKG T AXIS: 15 DEGREES
EKG VENTRICULAR RATE: 90 BPM
EOSINOPHILS ABSOLUTE: 0.3 THOU/MM3 (ref 0–0.5)
EOSINOPHILS RELATIVE PERCENT: 4.1 % (ref 0–4)
HCT VFR BLD CALC: 34.3 % (ref 37–47)
HEMOGLOBIN: 10.9 GM/DL (ref 12–16)
IMMATURE GRANS (ABS): 0 THOU/MM3 (ref 0–0.07)
IMMATURE GRANULOCYTES %: 0 %
LYMPHOCYTES # BLD AUTO: 36 % (ref 15–47)
LYMPHOCYTES ABSOLUTE: 3 THOU/MM3 (ref 1–4.8)
MCH RBC QN AUTO: 29.1 PG (ref 26–32)
MCHC RBC AUTO-ENTMCNC: 31.8 GM/DL (ref 31–35)
MCV RBC AUTO: 91.7 FL (ref 81–99)
MONOCYTES: 0.6 THOU/MM3 (ref 0.3–1.3)
MONOCYTES: 7 % (ref 0–12)
NEUTROPHILS ABSOLUTE: 4.4 THOU/MM3 (ref 1.8–7.7)
PDW BLD-RTO: 15.4 % (ref 11.5–14.9)
PLATELET # BLD AUTO: 256 THOU/MM3 (ref 130–400)
PMV BLD AUTO: 10 FL (ref 9.4–12.4)
RBC # BLD: 3.74 MILL/MM3 (ref 4.1–5.3)
SEG NEUTROPHILS: 52.3 % (ref 43–75)
WBC # BLD: 8.4 THOU/MM3 (ref 4.8–10.8)

## 2025-09-02 PROCEDURE — 6360000004 HC RX CONTRAST MEDICATION: Performed by: NURSE PRACTITIONER

## 2025-09-02 PROCEDURE — 85025 COMPLETE CBC W/AUTO DIFF WBC: CPT

## 2025-09-02 PROCEDURE — 36415 COLL VENOUS BLD VENIPUNCTURE: CPT

## 2025-09-02 PROCEDURE — 93010 ELECTROCARDIOGRAM REPORT: CPT | Performed by: INTERNAL MEDICINE

## 2025-09-02 PROCEDURE — 74174 CTA ABD&PLVS W/CONTRAST: CPT

## 2025-09-02 RX ORDER — IOPAMIDOL 755 MG/ML
100 INJECTION, SOLUTION INTRAVASCULAR
Status: COMPLETED | OUTPATIENT
Start: 2025-09-02 | End: 2025-09-02

## 2025-09-02 RX ADMIN — IOPAMIDOL 100 ML: 755 INJECTION, SOLUTION INTRAVENOUS at 16:28
